# Patient Record
Sex: MALE | Employment: FULL TIME | URBAN - METROPOLITAN AREA
[De-identification: names, ages, dates, MRNs, and addresses within clinical notes are randomized per-mention and may not be internally consistent; named-entity substitution may affect disease eponyms.]

---

## 2017-02-08 ENCOUNTER — OFFICE VISIT (OUTPATIENT)
Dept: INTERNAL MEDICINE | Facility: CLINIC | Age: 47
End: 2017-02-08
Payer: COMMERCIAL

## 2017-02-08 ENCOUNTER — LAB VISIT (OUTPATIENT)
Dept: LAB | Facility: HOSPITAL | Age: 47
End: 2017-02-08
Attending: INTERNAL MEDICINE
Payer: COMMERCIAL

## 2017-02-08 VITALS
TEMPERATURE: 99 F | DIASTOLIC BLOOD PRESSURE: 82 MMHG | BODY MASS INDEX: 28.13 KG/M2 | SYSTOLIC BLOOD PRESSURE: 118 MMHG | HEIGHT: 68 IN | HEART RATE: 79 BPM | WEIGHT: 185.63 LBS

## 2017-02-08 DIAGNOSIS — R10.30 LOWER ABDOMINAL PAIN: ICD-10-CM

## 2017-02-08 DIAGNOSIS — N50.819 TESTICULAR PAIN: ICD-10-CM

## 2017-02-08 DIAGNOSIS — M54.50 ACUTE BILATERAL LOW BACK PAIN WITHOUT SCIATICA: ICD-10-CM

## 2017-02-08 DIAGNOSIS — R10.30 LOWER ABDOMINAL PAIN: Primary | ICD-10-CM

## 2017-02-08 LAB
ALBUMIN SERPL BCP-MCNC: 3.8 G/DL
ALP SERPL-CCNC: 81 U/L
ALT SERPL W/O P-5'-P-CCNC: 13 U/L
ANION GAP SERPL CALC-SCNC: 10 MMOL/L
AST SERPL-CCNC: 15 U/L
BASOPHILS # BLD AUTO: 0.02 K/UL
BASOPHILS NFR BLD: 0.2 %
BILIRUB SERPL-MCNC: 0.7 MG/DL
BUN SERPL-MCNC: 12 MG/DL
CALCIUM SERPL-MCNC: 9.6 MG/DL
CHLORIDE SERPL-SCNC: 105 MMOL/L
CO2 SERPL-SCNC: 24 MMOL/L
CREAT SERPL-MCNC: 1 MG/DL
DIFFERENTIAL METHOD: ABNORMAL
EOSINOPHIL # BLD AUTO: 0.2 K/UL
EOSINOPHIL NFR BLD: 1.8 %
ERYTHROCYTE [DISTWIDTH] IN BLOOD BY AUTOMATED COUNT: 12.8 %
EST. GFR  (AFRICAN AMERICAN): >60 ML/MIN/1.73 M^2
EST. GFR  (NON AFRICAN AMERICAN): >60 ML/MIN/1.73 M^2
GLUCOSE SERPL-MCNC: 91 MG/DL
HCT VFR BLD AUTO: 43 %
HGB BLD-MCNC: 15 G/DL
LYMPHOCYTES # BLD AUTO: 1.7 K/UL
LYMPHOCYTES NFR BLD: 17.9 %
MCH RBC QN AUTO: 31.6 PG
MCHC RBC AUTO-ENTMCNC: 34.9 %
MCV RBC AUTO: 91 FL
MONOCYTES # BLD AUTO: 0.6 K/UL
MONOCYTES NFR BLD: 6.1 %
NEUTROPHILS # BLD AUTO: 6.8 K/UL
NEUTROPHILS NFR BLD: 73.8 %
PLATELET # BLD AUTO: 200 K/UL
PMV BLD AUTO: 10.2 FL
POTASSIUM SERPL-SCNC: 4.4 MMOL/L
PROT SERPL-MCNC: 7.3 G/DL
RBC # BLD AUTO: 4.74 M/UL
SODIUM SERPL-SCNC: 139 MMOL/L
WBC # BLD AUTO: 9.21 K/UL

## 2017-02-08 PROCEDURE — 99999 PR PBB SHADOW E&M-EST. PATIENT-LVL III: CPT | Mod: PBBFAC,,, | Performed by: INTERNAL MEDICINE

## 2017-02-08 PROCEDURE — 82270 OCCULT BLOOD FECES: CPT | Mod: S$GLB,,, | Performed by: INTERNAL MEDICINE

## 2017-02-08 PROCEDURE — 99214 OFFICE O/P EST MOD 30 MIN: CPT | Mod: S$GLB,,, | Performed by: INTERNAL MEDICINE

## 2017-02-08 PROCEDURE — 36415 COLL VENOUS BLD VENIPUNCTURE: CPT

## 2017-02-08 PROCEDURE — 80053 COMPREHEN METABOLIC PANEL: CPT

## 2017-02-08 PROCEDURE — 85025 COMPLETE CBC W/AUTO DIFF WBC: CPT

## 2017-02-08 NOTE — PROGRESS NOTES
Subjective:       Patient ID: Medardo Vizcaino III is a 46 y.o. male.    Chief Complaint: sore stomach; Leg Pain; and Back Pain    HPI Comments: HPI: 46-year-old male comes in with about 6 days' complaints of lower abdominal discomfort, low back pain and groin pain.  He presents with the story that last Thursday he ate some spicy vegetarian salad which is a little unusual.  Otherwise every Thursday night he goes out with friends and has 2 or 3 mixed drinks.  He doesn't drink much other than that.  On the way there he thought it is felt a little unusual and blamed it on the spicy food.  He still enjoyed the time with his friends, had a few alcoholic but is when he went home and finished off the spicy food.  He said it had a lot of onions and spice to it.  The following day he said his stomach felt somewhat upset so he took Tums and stool softener.  He was having a good appetite and could eat.  He was passing his bowel movements normally but subsequently developed low back discomfort, and the feeling of particular  achiness. no burning with urination.  No blood in the urine or stool.  No nausea.  Only some lower abdominal discomfort.  At the end of each day aching becomes worse and he has mild tenderness.  He denies fevers or chills.  Today is a little better but he says he usually gets worse at night.  He is concerned that he has something significant going on in his abdomen.  He has absolutely no family history of cancer    Leg Pain      Back Pain   Associated symptoms include abdominal pain and leg pain. Pertinent negatives include no chest pain, fever or headaches.     Review of Systems   Constitutional: Negative for chills, fatigue, fever and unexpected weight change.   HENT: Negative for trouble swallowing.    Eyes: Negative for visual disturbance.   Respiratory: Negative for cough, shortness of breath and wheezing.    Cardiovascular: Negative for chest pain and palpitations.   Gastrointestinal: Positive for  abdominal pain. Negative for constipation, diarrhea, nausea and vomiting.   Genitourinary: Positive for testicular pain (mild, achy). Negative for difficulty urinating.   Musculoskeletal: Positive for back pain. Negative for neck pain.   Skin: Negative for rash.   Neurological: Negative for dizziness and headaches.       Objective:      Physical Exam   Constitutional: He is oriented to person, place, and time. He appears well-developed and well-nourished. No distress.   HENT:   Head: Normocephalic and atraumatic.   Right Ear: External ear normal.   Left Ear: External ear normal.   Mouth/Throat: Oropharynx is clear and moist. No oropharyngeal exudate.   TM's clear, pharynx clear   Eyes: Conjunctivae and EOM are normal. Pupils are equal, round, and reactive to light. No scleral icterus.   Neck: Normal range of motion. Neck supple. No thyromegaly present.   No supraclavicular nodes palpated   Cardiovascular: Normal rate, regular rhythm and normal heart sounds.    No murmur heard.  Pulmonary/Chest: Effort normal and breath sounds normal. He has no wheezes.   Abdominal: Soft. Bowel sounds are normal. He exhibits no distension and no mass. There is no tenderness. There is no rebound and no guarding. No hernia. Hernia confirmed negative in the right inguinal area and confirmed negative in the left inguinal area.   Genitourinary: Rectum normal, prostate normal and penis normal. Rectal exam shows guaiac negative stool. No penile tenderness.   Genitourinary Comments: Penile ring in place through the urethral meatus. Another ring at the base of the scrotum posteriorly. No redness or tenderness.   Hemoccult Card Test revealed + control.    Musculoskeletal: He exhibits no edema.   Lymphadenopathy:     He has no cervical adenopathy.   Neurological: He is alert and oriented to person, place, and time.   Skin: No pallor.   Psychiatric: He has a normal mood and affect.       Assessment:       1. Lower abdominal pain    2. Testicular  pain    3. Acute bilateral low back pain without sciatica        Plan:       Medardo was seen today for sore stomach, leg pain and back pain.    Diagnoses and all orders for this visit:    Lower abdominal pain  -     Urine culture; Future  -     Urinalysis; Future  -     CBC auto differential; Future  -     Comprehensive metabolic panel; Future    Testicular pain  -     Urine culture; Future  -     Urinalysis; Future  -     CBC auto differential; Future  -     Comprehensive metabolic panel; Future    Acute bilateral low back pain without sciatica  -     Urine culture; Future  -     Urinalysis; Future  -     CBC auto differential; Future  -     Comprehensive metabolic panel; Future        Long discussion with patient that I truly suspect this relates to food and/or gastrointestinal condition, virus etc.  I asked him to continue treatment, and acid, bland diet, ibuprofen when necessary.  We will check labs and urine.  If all unremarkable and symptoms may consider imaging of the abdomen.

## 2017-02-08 NOTE — MR AVS SNAPSHOT
"    [facility]  [address]  [facility] [address]  Phone: [phone]  Fax: [phone]                  Medardo Vizcaino III   2017 2:45 PM   Office Visit    Description:  Male : 1970   Provider:  Arron Chen MD   Department:  [facility]           Reason for Visit     sore stomach     Leg Pain     Back Pain           Diagnoses this Visit        Comments    Lower abdominal pain    -  Primary     Testicular pain         Acute bilateral low back pain without sciatica                To Do List           Goals (5 Years of Data)     None      [facility]     [facility] Nurse Care Line -  Assistance  Registered nurses in the [facility] provide clinical advisement, health education, appointment booking, and other advisory services.  Call for this free service at [phone].             Medications           Message regarding Medications     Verify the changes and/or additions to your medication regime listed below are the same as discussed with your clinician today.  If any of these changes or additions are incorrect, please notify your healthcare provider.             Verify that the below list of medications is an accurate representation of the medications you are currently taking.  If none reported, the list may be blank. If incorrect, please contact your healthcare provider. Carry this list with you in case of emergency.           Current Medications     levothyroxine (SYNTHROID) 75 MCG tablet Take 1 tablet (75 mcg total) by mouth once daily.           Clinical Reference Information           Your Vitals Were     BP Pulse Temp Height Weight BMI    118/82 (BP Location: Left arm, Patient Position: Sitting, BP Method: Manual) 79 98.7 °F (37.1 °C) (Oral) 5' 8" (1.727 m) 84.2 kg (185 lb 10 oz) 28.22 kg/m2      Blood Pressure          Most Recent Value    BP  118/82      Allergies as of 2017     Sulfa (Sulfonamide " Antibiotics)      Immunizations Administered on Date of Encounter - 2/8/2017     None      Orders Placed During Today's Visit     Future Labs/Procedures Expected by Expires    CBC auto differential  2/8/2017 2/8/2018    Comprehensive metabolic panel  2/8/2017 2/8/2018    Urinalysis  2/8/2017 (Approximate) 4/9/2017    Urine culture  2/8/2017 (Approximate) 4/9/2017      Language Assistance Services     ATTENTION: Language assistance services are available, free of charge. Please call [phone].      ATENCIÓN: Si habla español, tiene a noyola disposición servicios gratuitos de asistencia lingüística. Llame al [phone].     CHÚ Ý: N?u b?n nói Ti?ng Vi?t, có các d?ch v? h? tr? ngôn ng? mi?n phí dành cho b?n. G?i s? [phone].         [facility] complies with applicable Federal civil rights laws and does not discriminate on the basis of race, color, national origin, age, disability, or sex.

## 2017-02-09 ENCOUNTER — PATIENT MESSAGE (OUTPATIENT)
Dept: INTERNAL MEDICINE | Facility: CLINIC | Age: 47
End: 2017-02-09

## 2017-02-10 ENCOUNTER — PATIENT MESSAGE (OUTPATIENT)
Dept: INTERNAL MEDICINE | Facility: CLINIC | Age: 47
End: 2017-02-10

## 2017-06-26 ENCOUNTER — TELEPHONE (OUTPATIENT)
Dept: SPORTS MEDICINE | Facility: CLINIC | Age: 47
End: 2017-06-26

## 2017-06-26 NOTE — TELEPHONE ENCOUNTER
LVM to offer pt earlier appt with Dr. Argueta on 6/28 at 9:45am. Advised to call office back if he would like this appt.

## 2017-06-28 ENCOUNTER — OFFICE VISIT (OUTPATIENT)
Dept: SPORTS MEDICINE | Facility: CLINIC | Age: 47
End: 2017-06-28
Payer: COMMERCIAL

## 2017-06-28 ENCOUNTER — HOSPITAL ENCOUNTER (OUTPATIENT)
Dept: RADIOLOGY | Facility: HOSPITAL | Age: 47
Discharge: HOME | End: 2017-06-28
Attending: ORTHOPAEDIC SURGERY
Payer: COMMERCIAL

## 2017-06-28 VITALS
WEIGHT: 185 LBS | DIASTOLIC BLOOD PRESSURE: 82 MMHG | BODY MASS INDEX: 28.04 KG/M2 | HEART RATE: 75 BPM | SYSTOLIC BLOOD PRESSURE: 134 MMHG | HEIGHT: 68 IN

## 2017-06-28 DIAGNOSIS — M25.521 RIGHT ELBOW PAIN: ICD-10-CM

## 2017-06-28 DIAGNOSIS — M77.11 LATERAL EPICONDYLITIS OF RIGHT ELBOW: Primary | ICD-10-CM

## 2017-06-28 PROCEDURE — 99214 OFFICE O/P EST MOD 30 MIN: CPT | Mod: 25,S$GLB,, | Performed by: ORTHOPAEDIC SURGERY

## 2017-06-28 PROCEDURE — 73080 X-RAY EXAM OF ELBOW: CPT | Mod: 26,RT,, | Performed by: RADIOLOGY

## 2017-06-28 PROCEDURE — 99999 PR PBB SHADOW E&M-EST. PATIENT-LVL V: CPT | Mod: PBBFAC,,, | Performed by: ORTHOPAEDIC SURGERY

## 2017-06-28 PROCEDURE — 73080 X-RAY EXAM OF ELBOW: CPT | Mod: TC,PO,RT

## 2017-06-28 PROCEDURE — 20551 NJX 1 TENDON ORIGIN/INSJ: CPT | Mod: RT,S$GLB,, | Performed by: ORTHOPAEDIC SURGERY

## 2017-06-28 RX ORDER — MELOXICAM 15 MG/1
15 TABLET ORAL DAILY
Qty: 30 TABLET | Refills: 2 | Status: SHIPPED | OUTPATIENT
Start: 2017-06-28 | End: 2017-06-28 | Stop reason: SDUPTHER

## 2017-06-28 RX ORDER — MELOXICAM 15 MG/1
15 TABLET ORAL DAILY
Qty: 30 TABLET | Refills: 2 | Status: SHIPPED | OUTPATIENT
Start: 2017-06-28 | End: 2017-07-28

## 2017-06-28 RX ORDER — BETAMETHASONE SODIUM PHOSPHATE AND BETAMETHASONE ACETATE 3; 3 MG/ML; MG/ML
6 INJECTION, SUSPENSION INTRA-ARTICULAR; INTRALESIONAL; INTRAMUSCULAR; SOFT TISSUE
Status: DISCONTINUED | OUTPATIENT
Start: 2017-06-28 | End: 2017-06-28

## 2017-06-28 RX ORDER — BETAMETHASONE SODIUM PHOSPHATE AND BETAMETHASONE ACETATE 3; 3 MG/ML; MG/ML
12 INJECTION, SUSPENSION INTRA-ARTICULAR; INTRALESIONAL; INTRAMUSCULAR; SOFT TISSUE
Status: COMPLETED | OUTPATIENT
Start: 2017-06-28 | End: 2017-06-28

## 2017-06-28 RX ADMIN — BETAMETHASONE SODIUM PHOSPHATE AND BETAMETHASONE ACETATE 12 MG: 3; 3 INJECTION, SUSPENSION INTRA-ARTICULAR; INTRALESIONAL; INTRAMUSCULAR; SOFT TISSUE at 12:06

## 2017-06-28 NOTE — PATIENT INSTRUCTIONS
Understanding Lateral Epicondylitis    Tendons are strong bands of tissue that connect muscles to bones. Lateral epicondylitis affects the tendons that connect muscles in the forearm to the lateral epicondyle. This is the bony knob on the outer side of the elbow. The condition occurs if the extensor tendons of the wrist become red and swollen (irritated). This can cause pain in the elbow, forearm, and wrist. Because the condition is sometimes caused by playing tennis, it is also known as tennis elbow.  How to say it  LA-tuhr-angi [id number]   What causes lateral epicondylitis?  The condition most often occurs because of overuse. This can be from any activity that repeatedly puts stress on the forearm extensor muscles or tendons and wrist. For instance, playing tennis, lifting weights, cutting meat, painting, and typing can all cause the condition. Wear and tear of the tendons from aging or an injury to the tendons can also cause the condition.  Symptoms of lateral epicondylitis  The most common symptom is pain. You may feel it on the outer side of the elbow and down the back of the forearm. It may be worse when moving or using the elbow, forearm, or wrist. You may also feel pain when gripping or lifting things.  Treatment for lateral epicondylitis  Treatments may include:  · Resting the elbow, forearm, and wrist. Youll need to avoid movements that can make your symptoms worse. You also may need to avoid certain sports and types of work for a time. This helps relieve symptoms and prevent further damage to the tendons.  · Changing the action that caused the problem. For instance, if the tendons were damaged from playing tennis, it may help to change your playing technique or use different equipment. This helps prevent further damage to the tendons.  · Using cold packs. Putting an ice pack on the injured area can help reduce pain and swelling.  · Taking pain medicines. Taking prescription or  over-the-counter pain medicines may help reduce pain and swelling.    · Wearing a brace. This helps reduce strain on the muscles and tendons in the forearm, which may relieve symptoms. It is very important to wear the brace properly.  · Doing exercises and physical therapy. These help improve strength and range of motion in the elbow, forearm, and wrist.  · Getting shots of medicine into the injured area. These may help relieve symptoms for a time.  · Having surgery. This may be an option if other treatments fail to relieve symptoms. In many cases, the surgeon removes the damaged tissue.  Possible complications of lateral epicondylitis  If the tendons involved dont heal properly, symptoms may return or get worse. To help prevent this, follow your treatment plan as directed.  When to call your healthcare provider  Call your healthcare provider right away if you have any of these:  · Fever of 100.4°F (38°C) or higher, or as directed  · Redness, swelling, or warmth in the elbow or forearm that gets worse  · Symptoms that dont get better with treatment, or get worse  · New symptoms   Date Last Reviewed: 3/10/2016  © 2665-1138 [employer]. [address] [place] [id number]. All rights reserved. This information is not intended as a substitute for professional medical care. Always follow your healthcare professional's instructions.

## 2017-06-28 NOTE — PROGRESS NOTES
Subjective:          Chief Complaint: Medardo Vizcaino III is a 46 y.o. male who had concerns including Follow-up of the Right Elbow.    45 yo male was moving and extension ladder and felt some pain consistent with tennis elbow; patient caught something 6 days later and had elbow pain            ROS    Pain Related Questions  Over the past 3 days, what was your average pain during activity? (I.e. running, jogging, walking, climbing stairs, getting dressed, ect.): 6  Over the past 3 days, what was your highest pain level?: 6  Over the past 3 days, what was your lowest pain level? : 5    Other  How many nights a week are you awakened by your affected body part?: 2  Was the patient's HEIGHT measured or patient reported?: Patient Reported  Was the patient's WEIGHT measured or patient reported?: Measured      Objective:        General: Medardo is well-developed, well-nourished, appears stated age, in no acute distress, alert and oriented to time, place and person.     General    Vitals reviewed.  Constitutional: He is oriented to person, place, and time. He appears well-developed and well-nourished. No distress.   HENT:   Right Ear: External ear normal.   Left Ear: External ear normal.   Nose: Nose normal.   Eyes: Pupils are equal, round, and reactive to light. Right eye exhibits no discharge. Left eye exhibits no discharge.   Neck: Normal range of motion.   Pulmonary/Chest: Effort normal and breath sounds normal. No respiratory distress.   Abdominal: Soft.   Neurological: He is alert and oriented to person, place, and time. He has normal reflexes. No cranial nerve deficit. He exhibits normal muscle tone. Coordination normal.   Psychiatric: He has a normal mood and affect. His behavior is normal. Judgment and thought content normal.             Right Hand/Wrist Exam     Inspection   Scars: Wrist - absent   Effusion: Wrist - absent   Bruising: Wrist - absent   Deformity: Wrist - deformity     Range of Motion     Wrist    Extension:  50 normal   Flexion:  70 normal   Abduction: 20 normal  Adduction: 35 normal    Tests   Phalens Sign: negative  Tinels Sign (Medial Nerve): negative  Finkelstein: negative  Carpal Tunnel Compression Test: negative  Cubital Tunnel Compression Test: negative  LT Stability: negative  Dean's Test: negative      Other     Neuorologic Exam    Median Distribution: normal  Ulnar Distribution: normal  Radial Distribution: normal      Left Hand/Wrist Exam     Inspection   Scars: Wrist - absent   Effusion: Wrist - absent   Bruising: Wrist - absent   Deformity: Wrist - absent     Range of Motion     Wrist   Extension:  50 normal   Flexion:  70 normal   Abduction: 20 normal  Adduction: 35 normal    Tests   Phalens Sign: negative  Tinels Sign (Medial Nerve): negative  Finkelstein: negative  Carpal Tunnel Compression Test: negative  Cubital Tunnel Compression Test: negative  LT Stability: negative  Dean's Test: negative      Other     Sensory Exam  Median Distribution: normal  Ulnar Distribution: normal  Radial Distribution: normal      Right Elbow Exam     Inspection   Scars: absent  Effusion: absent  Bruising: absent  Deformity: absent  Atrophy: absent    Pain   The patient exhibits pain of the lateral epicondyle    Range of Motion   Extension:  0 normal   Flexion:  130 normal   Pronation: normal Right elbow pronation: 50.   Supination: normal Right elbow supination: 50.     Tests Varus: negative  Valgus: negative  Tinel's Sign (cubital tunnel): negative  Tennis Elbow: moderate  Golfer's Elbow: negative  Radial Capitellar Grind: negative    Other   Sensation: normal    Comments:  TTP over lateral epicondyle and muscle mass      Left Elbow Exam     Inspection   Scars: absent  Effusion: absent  Bruising: absent  Deformity: absent  Atrophy: absent    Range of Motion   Extension:  0 normal   Flexion:  130 normal   Pronation: normal Left elbow pronation: 50.   Supination: normal Left elbow supination: 50.      Tests Varus: negative  Tinel's Sign (cubital tunnel): negative  Tennis Elbow: negative  Golfer's Elbow: negative  Radial Capitellar Grind: negative    Other   Sensation: normal        Muscle Strength   Right Upper Extremity   Wrist Extension: 5/5/5   Wrist Flexion: 5/5/5   : 5/5/5   Pinch Mechanism: 5/5  Elbow Pronation:  5/5   Elbow Supination:  5/5   Elbow Extension: 5/5  Elbow Flexion: 5/5  Intrinsics: 5/5  EPL (Extensor Pollicis Longus): 5/5  Left Upper Extremity  Wrist Extension: 5/5/5   Wrist Flexion: 5/5/5   :  5/5/5   Pinch Mechanism: 5/5  Elbow Pronation:  5/5   Elbow Supination:  5/5   Elbow Extension: 5/5  Elbow Flexion: 5/5  Intrinsics: 5/5  EPL (Extensor Pollicis Longus): 5/5    Vascular Exam     Right Pulses      Radial:                    2+      Left Pulses      Radial:                    2+      Capillary Refill  Right Hand: normal capillary refill  Left Hand: normal capillary refill  Right Matt's Test: no rapid refill no slow refill  Left Matt's Test: no rapid refill no slow refill    Edema  Right Forearm: absent  Left Forearm: absent        Xray:  3 views: No fracture dislocation bone destruction seen.  There is DJD and there is a triceps spur.  This triceps spur may have recently fractured.            Assessment:       Encounter Diagnoses   Name Primary?    Right elbow pain     Lateral epicondylitis of right elbow Yes          Plan:       1. Elbow Function Score, SF-12 was filled out today in clinic.     RTC in 6 weeks with Dr. Bautista Argueta. Patient will fill out Elbow Function Score, and SF-12 on return.    2. Pain control ( Meloxicam)    3. Injection Procedure right elbow    After time out was performed, including verification of patient ID, procedure, site and side, availability of information and equipment, review of safety issues, and agreement with consent, the procedure site was marked and the patient was prepped aseptically. A diagnostic and therapeutic injection of 3cc  Betamethasone/Marcaine and ethyl chloride spray was given under sterile technique using a 21.5g x 1.5 needle into the right Lateral Epicondyle/Extension Tendon in seated position.     The patient had no adverse reactions to the medication. Pain decreased. The patient was instructed to apply ice to the joint for 20 minutes and avoid strenuous activities for 24-36 hours following the injection. Patient was warned of possible blood sugar and/or blood pressure changes during that time. Following that time, patient can resume regular activities.    Patient was reminded to call the clinic immediately for any adverse side effects as explained in clinic today.    [id number] - tomeka carlin, performed a custom orthotic / brace adjustment, fitting and training with the patient. The patient demonstrated understanding and proper care. This was performed for 15 minutes.                [facility] patient questionnaires have been collected today.

## 2017-08-10 ENCOUNTER — OFFICE VISIT (OUTPATIENT)
Dept: PSYCHIATRY | Facility: CLINIC | Age: 47
End: 2017-08-10
Payer: COMMERCIAL

## 2017-08-10 DIAGNOSIS — Z03.89 NO DIAGNOSIS ON AXIS I: Primary | ICD-10-CM

## 2017-08-10 PROCEDURE — 90791 PSYCH DIAGNOSTIC EVALUATION: CPT | Mod: S$GLB,,, | Performed by: SOCIAL WORKER

## 2017-09-07 ENCOUNTER — OFFICE VISIT (OUTPATIENT)
Dept: PSYCHIATRY | Facility: CLINIC | Age: 47
End: 2017-09-07
Payer: COMMERCIAL

## 2017-09-07 DIAGNOSIS — F43.23 ADJUSTMENT DISORDER WITH MIXED ANXIETY AND DEPRESSED MOOD: Primary | ICD-10-CM

## 2017-09-07 PROCEDURE — 99999 PR PBB SHADOW E&M-EST. PATIENT-LVL I: CPT | Mod: PBBFAC,,, | Performed by: SOCIAL WORKER

## 2017-09-07 PROCEDURE — 90847 FAMILY PSYTX W/PT 50 MIN: CPT | Mod: S$GLB,,, | Performed by: SOCIAL WORKER

## 2017-09-12 ENCOUNTER — LAB VISIT (OUTPATIENT)
Dept: LAB | Facility: HOSPITAL | Age: 47
End: 2017-09-12
Attending: INTERNAL MEDICINE
Payer: COMMERCIAL

## 2017-09-12 ENCOUNTER — OFFICE VISIT (OUTPATIENT)
Dept: INTERNAL MEDICINE | Facility: CLINIC | Age: 47
End: 2017-09-12
Payer: COMMERCIAL

## 2017-09-12 VITALS
BODY MASS INDEX: 27.19 KG/M2 | SYSTOLIC BLOOD PRESSURE: 110 MMHG | HEART RATE: 63 BPM | HEIGHT: 68 IN | DIASTOLIC BLOOD PRESSURE: 72 MMHG | WEIGHT: 179.44 LBS

## 2017-09-12 DIAGNOSIS — E03.4 HYPOTHYROIDISM DUE TO ACQUIRED ATROPHY OF THYROID: ICD-10-CM

## 2017-09-12 DIAGNOSIS — M77.11 LATERAL EPICONDYLITIS OF RIGHT ELBOW: ICD-10-CM

## 2017-09-12 DIAGNOSIS — Z00.00 ROUTINE MEDICAL EXAM: Primary | ICD-10-CM

## 2017-09-12 DIAGNOSIS — E78.5 HYPERLIPIDEMIA, UNSPECIFIED HYPERLIPIDEMIA TYPE: ICD-10-CM

## 2017-09-12 DIAGNOSIS — Z00.00 ROUTINE MEDICAL EXAM: ICD-10-CM

## 2017-09-12 LAB
ANION GAP SERPL CALC-SCNC: 10 MMOL/L
BUN SERPL-MCNC: 12 MG/DL
CALCIUM SERPL-MCNC: 9.7 MG/DL
CHLORIDE SERPL-SCNC: 107 MMOL/L
CHOLEST SERPL-MCNC: 228 MG/DL
CHOLEST/HDLC SERPL: 4.1 {RATIO}
CO2 SERPL-SCNC: 23 MMOL/L
CREAT SERPL-MCNC: 1 MG/DL
EST. GFR  (AFRICAN AMERICAN): >60 ML/MIN/1.73 M^2
EST. GFR  (NON AFRICAN AMERICAN): >60 ML/MIN/1.73 M^2
GLUCOSE SERPL-MCNC: 97 MG/DL
HDLC SERPL-MCNC: 56 MG/DL
HDLC SERPL: 24.6 %
LDLC SERPL CALC-MCNC: 156.2 MG/DL
NONHDLC SERPL-MCNC: 172 MG/DL
POTASSIUM SERPL-SCNC: 4.4 MMOL/L
SODIUM SERPL-SCNC: 140 MMOL/L
TRIGL SERPL-MCNC: 79 MG/DL
TSH SERPL DL<=0.005 MIU/L-ACNC: 2.24 UIU/ML

## 2017-09-12 PROCEDURE — 84443 ASSAY THYROID STIM HORMONE: CPT

## 2017-09-12 PROCEDURE — 99999 PR PBB SHADOW E&M-EST. PATIENT-LVL III: CPT | Mod: PBBFAC,,, | Performed by: INTERNAL MEDICINE

## 2017-09-12 PROCEDURE — 36415 COLL VENOUS BLD VENIPUNCTURE: CPT

## 2017-09-12 PROCEDURE — 80061 LIPID PANEL: CPT

## 2017-09-12 PROCEDURE — 80048 BASIC METABOLIC PNL TOTAL CA: CPT

## 2017-09-12 PROCEDURE — 99396 PREV VISIT EST AGE 40-64: CPT | Mod: S$GLB,,, | Performed by: INTERNAL MEDICINE

## 2017-09-12 RX ORDER — LEVOTHYROXINE SODIUM 75 UG/1
75 TABLET ORAL DAILY
Qty: 90 TABLET | Refills: 3 | Status: SHIPPED | OUTPATIENT
Start: 2017-09-12 | End: 2018-10-16 | Stop reason: SDUPTHER

## 2017-09-12 NOTE — PROGRESS NOTES
Subjective:       Patient ID: Medardo Vizcaino III is a 46 y.o. male.    Chief Complaint: Annual Exam    History of present illness: Patient comes in for annual exam.  He tells me he unfortunately is going through a divorce but does not feel like things are too stressed at this time.  He needs some labs for work and I will fill out his biometric screen.  His only other issue is he is recovering from right tennis elbow after an injury.  That seems to be getting better.  He did see orthopedics.      Review of Systems   Constitutional: Negative for activity change, chills, fatigue, fever and unexpected weight change.   HENT: Negative for ear pain, hearing loss, rhinorrhea, sore throat and trouble swallowing.    Eyes: Negative for pain, discharge and visual disturbance.   Respiratory: Negative for cough, chest tightness, shortness of breath and wheezing.    Cardiovascular: Negative for chest pain, palpitations and leg swelling.   Gastrointestinal: Negative for abdominal pain, blood in stool, constipation, diarrhea, nausea and vomiting.   Endocrine: Negative for polydipsia and polyuria.   Genitourinary: Negative for difficulty urinating, frequency, hematuria and urgency.   Musculoskeletal: Positive for arthralgias (right elbow pain laterally). Negative for back pain, joint swelling, myalgias, neck pain and neck stiffness.   Skin: Negative for rash and wound.   Neurological: Negative for dizziness, weakness, numbness and headaches.   Hematological: Negative for adenopathy.   Psychiatric/Behavioral: Negative for confusion and dysphoric mood. The patient is not nervous/anxious.        Objective:      Physical Exam   Constitutional: He is oriented to person, place, and time. He appears well-developed and well-nourished. No distress.   HENT:   Head: Normocephalic and atraumatic.   Right Ear: External ear normal.   Left Ear: External ear normal.   Mouth/Throat: Oropharynx is clear and moist. No oropharyngeal exudate.   TM's  clear, pharynx clear   Eyes: Conjunctivae and EOM are normal. Pupils are equal, round, and reactive to light. No scleral icterus.   Neck: Normal range of motion. Neck supple. No thyromegaly present.   No supraclavicular nodes palpated   Cardiovascular: Normal rate, regular rhythm and normal heart sounds.    No murmur heard.  Pulmonary/Chest: Effort normal and breath sounds normal. He has no wheezes.   Abdominal: Soft. Bowel sounds are normal. He exhibits no mass. There is no tenderness.   Musculoskeletal: He exhibits tenderness (mild lateral right elbow especially with pronation). He exhibits no edema.   Lymphadenopathy:     He has no cervical adenopathy.   Neurological: He is alert and oriented to person, place, and time.   Skin: No rash noted. No pallor.   Psychiatric: He has a normal mood and affect.       Assessment:       1. Routine medical exam    2. Hypothyroidism due to acquired atrophy of thyroid    3. Lateral epicondylitis of right elbow    4. Hyperlipidemia, unspecified hyperlipidemia type        Plan:       Medardo was seen today for annual exam.    Diagnoses and all orders for this visit:    Routine medical exam  -     Lipid panel; Future  -     Basic metabolic panel; Future  -     TSH; Future    Hypothyroidism due to acquired atrophy of thyroid  -     Lipid panel; Future  -     TSH; Future    Lateral epicondylitis of right elbow    Hyperlipidemia, unspecified hyperlipidemia type    Other orders  -     levothyroxine (SYNTHROID) 75 MCG tablet; Take 1 tablet (75 mcg total) by mouth once daily.        Review studies and follow-up annually

## 2017-09-18 ENCOUNTER — OFFICE VISIT (OUTPATIENT)
Dept: PSYCHIATRY | Facility: CLINIC | Age: 47
End: 2017-09-18
Payer: COMMERCIAL

## 2017-09-18 DIAGNOSIS — Z03.89 NO DIAGNOSIS ON AXIS I: Primary | ICD-10-CM

## 2017-09-18 PROCEDURE — 90834 PSYTX W PT 45 MINUTES: CPT | Mod: S$GLB,,, | Performed by: SOCIAL WORKER

## 2017-09-18 PROCEDURE — 99999 PR PBB SHADOW E&M-EST. PATIENT-LVL I: CPT | Mod: PBBFAC,,, | Performed by: SOCIAL WORKER

## 2018-09-11 ENCOUNTER — OFFICE VISIT (OUTPATIENT)
Dept: INTERNAL MEDICINE | Facility: CLINIC | Age: 48
End: 2018-09-11
Payer: COMMERCIAL

## 2018-09-11 ENCOUNTER — LAB VISIT (OUTPATIENT)
Dept: LAB | Facility: HOSPITAL | Age: 48
End: 2018-09-11
Attending: INTERNAL MEDICINE
Payer: COMMERCIAL

## 2018-09-11 ENCOUNTER — PATIENT MESSAGE (OUTPATIENT)
Dept: INTERNAL MEDICINE | Facility: CLINIC | Age: 48
End: 2018-09-11

## 2018-09-11 VITALS
HEART RATE: 66 BPM | BODY MASS INDEX: 27.96 KG/M2 | SYSTOLIC BLOOD PRESSURE: 138 MMHG | DIASTOLIC BLOOD PRESSURE: 88 MMHG | OXYGEN SATURATION: 98 % | HEIGHT: 68 IN | WEIGHT: 184.5 LBS

## 2018-09-11 DIAGNOSIS — E03.4 HYPOTHYROIDISM DUE TO ACQUIRED ATROPHY OF THYROID: ICD-10-CM

## 2018-09-11 DIAGNOSIS — Z00.00 ROUTINE PHYSICAL EXAMINATION: Primary | ICD-10-CM

## 2018-09-11 DIAGNOSIS — Z00.00 ROUTINE PHYSICAL EXAMINATION: ICD-10-CM

## 2018-09-11 DIAGNOSIS — M79.89 SOFT TISSUE MASS: ICD-10-CM

## 2018-09-11 LAB
ALBUMIN SERPL BCP-MCNC: 4.2 G/DL
ALP SERPL-CCNC: 74 U/L
ALT SERPL W/O P-5'-P-CCNC: 38 U/L
ANION GAP SERPL CALC-SCNC: 11 MMOL/L
AST SERPL-CCNC: 29 U/L
BASOPHILS # BLD AUTO: 0.03 K/UL
BASOPHILS NFR BLD: 0.5 %
BILIRUB SERPL-MCNC: 1 MG/DL
BUN SERPL-MCNC: 9 MG/DL
CALCIUM SERPL-MCNC: 9.7 MG/DL
CHLORIDE SERPL-SCNC: 105 MMOL/L
CHOLEST SERPL-MCNC: 243 MG/DL
CHOLEST/HDLC SERPL: 4 {RATIO}
CO2 SERPL-SCNC: 23 MMOL/L
CREAT SERPL-MCNC: 1 MG/DL
DIFFERENTIAL METHOD: NORMAL
EOSINOPHIL # BLD AUTO: 0.1 K/UL
EOSINOPHIL NFR BLD: 1.1 %
ERYTHROCYTE [DISTWIDTH] IN BLOOD BY AUTOMATED COUNT: 13.2 %
EST. GFR  (AFRICAN AMERICAN): >60 ML/MIN/1.73 M^2
EST. GFR  (NON AFRICAN AMERICAN): >60 ML/MIN/1.73 M^2
GLUCOSE SERPL-MCNC: 94 MG/DL
HCT VFR BLD AUTO: 45.6 %
HDLC SERPL-MCNC: 61 MG/DL
HDLC SERPL: 25.1 %
HGB BLD-MCNC: 15.2 G/DL
LDLC SERPL CALC-MCNC: 167.8 MG/DL
LYMPHOCYTES # BLD AUTO: 1.6 K/UL
LYMPHOCYTES NFR BLD: 26.3 %
MCH RBC QN AUTO: 30.9 PG
MCHC RBC AUTO-ENTMCNC: 33.3 G/DL
MCV RBC AUTO: 93 FL
MONOCYTES # BLD AUTO: 0.4 K/UL
MONOCYTES NFR BLD: 6.7 %
NEUTROPHILS # BLD AUTO: 4.1 K/UL
NEUTROPHILS NFR BLD: 65.2 %
NONHDLC SERPL-MCNC: 182 MG/DL
NRBC BLD-RTO: 0 /100 WBC
PLATELET # BLD AUTO: 231 K/UL
PMV BLD AUTO: 10.3 FL
POTASSIUM SERPL-SCNC: 3.9 MMOL/L
PROT SERPL-MCNC: 7.1 G/DL
RBC # BLD AUTO: 4.92 M/UL
SODIUM SERPL-SCNC: 139 MMOL/L
TRIGL SERPL-MCNC: 71 MG/DL
TSH SERPL DL<=0.005 MIU/L-ACNC: 1.91 UIU/ML
WBC # BLD AUTO: 6.24 K/UL

## 2018-09-11 PROCEDURE — 36415 COLL VENOUS BLD VENIPUNCTURE: CPT

## 2018-09-11 PROCEDURE — 80053 COMPREHEN METABOLIC PANEL: CPT

## 2018-09-11 PROCEDURE — 80061 LIPID PANEL: CPT

## 2018-09-11 PROCEDURE — 99396 PREV VISIT EST AGE 40-64: CPT | Mod: S$GLB,,, | Performed by: INTERNAL MEDICINE

## 2018-09-11 PROCEDURE — 85025 COMPLETE CBC W/AUTO DIFF WBC: CPT

## 2018-09-11 PROCEDURE — 99999 PR PBB SHADOW E&M-EST. PATIENT-LVL III: CPT | Mod: PBBFAC,,, | Performed by: INTERNAL MEDICINE

## 2018-09-11 PROCEDURE — 84443 ASSAY THYROID STIM HORMONE: CPT

## 2018-09-11 NOTE — PROGRESS NOTES
Subjective:       Patient ID: Medardo Vizcaino III is a 47 y.o. male.    Chief Complaint: Annual Exam    HPI:  Patient comes in for annual exam.  Is history of hypothyroidism and needs updated labs.  He has a few lumps on the right arm including forearm that had been present for a while but a new lesion on the upper arm he thinks is new.  Over the years he had 2 cyst or lipoma was removed from that warm.  He does not have any anywhere else wanted to know if it was unusual to have 5 on the same warm.  None seem to be growing and he would rather wait a little while before seeing a surgeon to get this checked or removed.  I certainly think that is reasonable.  Unless any of the started growing rapidly or causing pain.  We updated his history such that his a angioplasty and a pacemaker placed.  Patient is starting to have some arthritis symptoms and he is concerned a bit because both of his parents had joint surgeries in the past.  Stressed good diet exercise and regular attention to weight      Review of Systems   Constitutional: Negative for chills, fatigue, fever and unexpected weight change.   HENT: Negative for nosebleeds and trouble swallowing.    Eyes: Negative for pain and visual disturbance.   Respiratory: Negative for cough, shortness of breath and wheezing.    Cardiovascular: Negative for chest pain and palpitations.   Gastrointestinal: Negative for abdominal pain, constipation, diarrhea, nausea and vomiting.   Genitourinary: Negative for difficulty urinating and hematuria.   Musculoskeletal: Positive for arthralgias. Negative for neck pain.   Skin: Negative for rash.        Three soft tissue mobile nontender lumps on the right arm   Neurological: Negative for dizziness and headaches.   Hematological: Does not bruise/bleed easily.   Psychiatric/Behavioral: Negative for dysphoric mood, sleep disturbance and suicidal ideas.       Objective:      Physical Exam   Constitutional: He is oriented to person, place,  and time. He appears well-developed and well-nourished. No distress.   HENT:   Head: Normocephalic and atraumatic.   Right Ear: External ear normal.   Left Ear: External ear normal.   Mouth/Throat: Oropharynx is clear and moist. No oropharyngeal exudate.   TM's clear, pharynx clear   Eyes: Conjunctivae and EOM are normal. Pupils are equal, round, and reactive to light. No scleral icterus.   Neck: Normal range of motion. Neck supple. No thyromegaly present.   No supraclavicular nodes palpated   Cardiovascular: Normal rate, regular rhythm and normal heart sounds.   No murmur heard.  Pulmonary/Chest: Effort normal and breath sounds normal. He has no wheezes.   Abdominal: Soft. Bowel sounds are normal. He exhibits no mass. There is no tenderness.   Musculoskeletal: He exhibits no edema.   Lymphadenopathy:     He has no cervical adenopathy.   No cervical, supraclavicular, axillary, epitrochlear or pectoral lymphadenopathy   Neurological: He is alert and oriented to person, place, and time.   Skin: No rash noted. No erythema. No pallor.   Three soft tissue superficial lumps or masses on the right arm.  Two below the elbow which or nontender have been present for a while.  Upper arm the triceps is also nontender I suspect lipoma or cyst.   Psychiatric: He has a normal mood and affect. His behavior is normal.       Assessment:       1. Routine physical examination    2. Hypothyroidism due to acquired atrophy of thyroid    3. Soft tissue mass        Plan:       Medardo was seen today for annual exam.    Diagnoses and all orders for this visit:    Routine physical examination  -     TSH; Future  -     Lipid panel; Future  -     CBC auto differential; Future  -     Comprehensive metabolic panel; Future    Hypothyroidism due to acquired atrophy of thyroid  -     TSH; Future    Soft tissue mass  Comments:  Right forearm

## 2018-09-13 ENCOUNTER — PATIENT MESSAGE (OUTPATIENT)
Dept: INTERNAL MEDICINE | Facility: CLINIC | Age: 48
End: 2018-09-13

## 2018-09-13 DIAGNOSIS — M54.50 BACK PAIN OF THORACOLUMBAR REGION: ICD-10-CM

## 2018-09-13 DIAGNOSIS — M54.6 BACK PAIN OF THORACOLUMBAR REGION: ICD-10-CM

## 2018-09-13 DIAGNOSIS — M54.2 NECK PAIN: Primary | ICD-10-CM

## 2018-10-16 RX ORDER — LEVOTHYROXINE SODIUM 75 UG/1
TABLET ORAL
Qty: 90 TABLET | Refills: 3 | Status: SHIPPED | OUTPATIENT
Start: 2018-10-16 | End: 2019-11-13 | Stop reason: SDUPTHER

## 2018-12-05 ENCOUNTER — PATIENT MESSAGE (OUTPATIENT)
Dept: INTERNAL MEDICINE | Facility: CLINIC | Age: 48
End: 2018-12-05

## 2018-12-05 DIAGNOSIS — M79.641 PAIN IN BOTH HANDS: ICD-10-CM

## 2018-12-05 DIAGNOSIS — M79.642 PAIN IN BOTH HANDS: ICD-10-CM

## 2018-12-05 DIAGNOSIS — M19.90 ARTHRITIS: Primary | ICD-10-CM

## 2018-12-05 DIAGNOSIS — D17.9 LIPOMA, UNSPECIFIED SITE: ICD-10-CM

## 2018-12-05 DIAGNOSIS — M54.2 NECK PAIN: ICD-10-CM

## 2018-12-05 DIAGNOSIS — M54.50 BACK PAIN OF THORACOLUMBAR REGION: ICD-10-CM

## 2018-12-05 DIAGNOSIS — M54.6 BACK PAIN OF THORACOLUMBAR REGION: ICD-10-CM

## 2018-12-07 ENCOUNTER — PATIENT MESSAGE (OUTPATIENT)
Dept: INTERNAL MEDICINE | Facility: CLINIC | Age: 48
End: 2018-12-07

## 2018-12-07 NOTE — TELEPHONE ENCOUNTER
Help pt with General Surgery and Rheumatology. He has the chiropractor appt but may need the referral sent to him or the doctor.

## 2018-12-26 ENCOUNTER — PATIENT MESSAGE (OUTPATIENT)
Dept: INTERNAL MEDICINE | Facility: CLINIC | Age: 48
End: 2018-12-26

## 2018-12-26 DIAGNOSIS — Z12.83 SKIN CANCER SCREENING: Primary | ICD-10-CM

## 2019-01-07 ENCOUNTER — OFFICE VISIT (OUTPATIENT)
Dept: SURGERY | Facility: CLINIC | Age: 49
End: 2019-01-07
Payer: COMMERCIAL

## 2019-01-07 VITALS
DIASTOLIC BLOOD PRESSURE: 85 MMHG | WEIGHT: 171 LBS | TEMPERATURE: 98 F | SYSTOLIC BLOOD PRESSURE: 127 MMHG | HEART RATE: 62 BPM | HEIGHT: 68 IN | BODY MASS INDEX: 25.91 KG/M2

## 2019-01-07 DIAGNOSIS — D17.21 LIPOMA OF RIGHT UPPER EXTREMITY: Primary | ICD-10-CM

## 2019-01-07 PROCEDURE — 99999 PR PBB SHADOW E&M-EST. PATIENT-LVL III: ICD-10-PCS | Mod: PBBFAC,,, | Performed by: SURGERY

## 2019-01-07 PROCEDURE — 3008F BODY MASS INDEX DOCD: CPT | Mod: CPTII,S$GLB,, | Performed by: SURGERY

## 2019-01-07 PROCEDURE — 99203 OFFICE O/P NEW LOW 30 MIN: CPT | Mod: S$GLB,,, | Performed by: SURGERY

## 2019-01-07 PROCEDURE — 3008F PR BODY MASS INDEX (BMI) DOCUMENTED: ICD-10-PCS | Mod: CPTII,S$GLB,, | Performed by: SURGERY

## 2019-01-07 PROCEDURE — 99203 PR OFFICE/OUTPT VISIT, NEW, LEVL III, 30-44 MIN: ICD-10-PCS | Mod: S$GLB,,, | Performed by: SURGERY

## 2019-01-07 PROCEDURE — 99999 PR PBB SHADOW E&M-EST. PATIENT-LVL III: CPT | Mod: PBBFAC,,, | Performed by: SURGERY

## 2019-01-07 NOTE — PROGRESS NOTES
Patient ID: Medardo Vizcaino III is a 48 y.o. male.    Chief Complaint: Lump ( X's 3 right Arm)      HPI:  Medardo Vizcaino III is a 48 y.o. Male with hypothyroidism presents for evaluation of soft tissue mass x3 on right arm.  States that he has had mass on hand removed in 1983, and one near elbow in 2007.  He states he has had two masses for at least two years and one in his upper arm for the last 1.5 years.  They have been fairly stable in size, but increasing slightly over the last year.        Review of Systems   Constitutional: Negative for activity change and appetite change.   HENT: Negative for sinus pain, sneezing and tinnitus.    Eyes: Negative for photophobia, pain, discharge, redness, itching and visual disturbance.   Respiratory: Negative for apnea, cough, choking and chest tightness.    Cardiovascular: Negative for chest pain, palpitations and leg swelling.   Gastrointestinal: Negative for abdominal distention, abdominal pain, anal bleeding and blood in stool.   Endocrine: Negative for cold intolerance and heat intolerance.   Genitourinary: Negative for difficulty urinating and dysuria.   Musculoskeletal: Negative for arthralgias, back pain, gait problem and joint swelling.   Skin: Negative for color change, pallor, rash and wound.   Allergic/Immunologic: Negative for environmental allergies.   Neurological: Negative for dizziness, facial asymmetry and headaches.   Psychiatric/Behavioral: Negative for agitation, behavioral problems, confusion and decreased concentration.       Current Outpatient Medications   Medication Sig Dispense Refill    levothyroxine (SYNTHROID) 75 MCG tablet TAKE ONE TABLET BY MOUTH ONCE DAILY 90 tablet 3     No current facility-administered medications for this visit.        Review of patient's allergies indicates:   Allergen Reactions    Sulfa (sulfonamide antibiotics) Hives       Past Medical History:   Diagnosis Date    Acromioclavicular joint arthritis 3/20/2014     Hypothyroidism        Past Surgical History:   Procedure Laterality Date    ARTHROSCOPY, SHOULDER Right 3/26/2014    [facility]    ARTHROSCOPY, SHOULDER, WITH SUBACROMIAL SPACE DECOMPRESSION Right 3/26/2014    [facility]    EXCISION, CLAVICLE, DISTAL Right 3/26/2014    [facility]    FIXATION, TENDON Right 3/26/2014    [facility]    NASAL SEPTUM SURGERY      REPAIR, ROTATOR CUFF, ARTHROSCOPIC Right 3/26/2014    [facility]    SHOULDER ARTHROSCOPY      SHOULDER SURGERY      TONSILLECTOMY      TUMOR EXCISION      arm       Family History   Problem Relation Age of Onset    Hypertension Father     Pacemaker/defibrilator Father         Pacemaker    Diabetes Maternal Grandmother     Diabetes Maternal Grandfather     Heart disease Paternal Grandfather     Arthritis Mother     Hypothyroidism Mother     Hypothyroidism Sister     No Known Problems Paternal Grandmother     No Known Problems Brother     No Known Problems Maternal Aunt     No Known Problems Maternal Uncle     No Known Problems Paternal Aunt     No Known Problems Paternal Uncle     Amblyopia Neg Hx     Blindness Neg Hx     Cancer Neg Hx     Cataracts Neg Hx     Glaucoma Neg Hx     Macular degeneration Neg Hx     Retinal detachment Neg Hx     Strabismus Neg Hx     Stroke Neg Hx     Thyroid disease Neg Hx        Social History     Socioeconomic History    Marital status: Unknown     Spouse name: Not on file    Number of children: Not on file    Years of education: Not on file    Highest education level: Not on file   Social Needs    Financial resource strain: Not on file    Food insecurity - worry: Not on file    Food insecurity - inability: Not on file    Transportation needs - medical: Not on file    Transportation needs - non-medical: Not  on file   Occupational History    Not on file   Tobacco Use    Smoking status: Never Smoker   Substance and Sexual Activity    Alcohol use: Yes     Comment: one daily    Drug use: Not on file    Sexual activity: Yes     Partners: Female   Other Topics Concern    Not on file   Social History Narrative    Not on file       Vitals:    01/07/19 1431   BP: 127/85   Pulse: 62   Temp: 98.3 °F (36.8 °C)       Physical Exam   Constitutional: He is oriented to person, place, and time. He appears well-developed and well-nourished.   HENT:   Head: Normocephalic and atraumatic.   Eyes: Right eye exhibits no discharge. Left eye exhibits no discharge.   Neck: Normal range of motion. Neck supple. No thyromegaly present.   Cardiovascular: Normal rate and regular rhythm.   Pulmonary/Chest: Effort normal. No respiratory distress.   Abdominal: Soft. He exhibits no distension. There is no tenderness.   Musculoskeletal: Normal range of motion.   Neurological: He is alert and oriented to person, place, and time.   Skin: Skin is warm and dry.   3 masses on right arm.  2 on lower arm, 1 on upper arm.  All approx 2 cm.  All are soft and mobile.   Psychiatric: He has a normal mood and affect. His behavior is normal.   Vitals reviewed.      Assessment & Plan:    Medardo Vizcaino III is a 48 y.o. male who presents with soft tissue mass x 3.    -Masses are consistent with lipomas.  All are superficial, soft, and mobile.  -Will book patient for removal in minors clinic.      I have personally taken the history and examined this patient and agree with the resident's note as stated above.         Brenden Pina MD

## 2019-01-16 ENCOUNTER — OFFICE VISIT (OUTPATIENT)
Dept: INTERNAL MEDICINE | Facility: CLINIC | Age: 49
End: 2019-01-16
Payer: COMMERCIAL

## 2019-01-16 VITALS
BODY MASS INDEX: 25.9 KG/M2 | HEIGHT: 68 IN | HEART RATE: 67 BPM | DIASTOLIC BLOOD PRESSURE: 82 MMHG | WEIGHT: 170.88 LBS | OXYGEN SATURATION: 97 % | SYSTOLIC BLOOD PRESSURE: 126 MMHG

## 2019-01-16 DIAGNOSIS — R42 VERTIGO: ICD-10-CM

## 2019-01-16 DIAGNOSIS — H66.90 OTITIS MEDIA, UNSPECIFIED LATERALITY, UNSPECIFIED OTITIS MEDIA TYPE: Primary | ICD-10-CM

## 2019-01-16 PROCEDURE — 3008F BODY MASS INDEX DOCD: CPT | Mod: CPTII,S$GLB,, | Performed by: INTERNAL MEDICINE

## 2019-01-16 PROCEDURE — 99999 PR PBB SHADOW E&M-EST. PATIENT-LVL IV: ICD-10-PCS | Mod: PBBFAC,,, | Performed by: INTERNAL MEDICINE

## 2019-01-16 PROCEDURE — 99213 OFFICE O/P EST LOW 20 MIN: CPT | Mod: S$GLB,,, | Performed by: INTERNAL MEDICINE

## 2019-01-16 PROCEDURE — 3008F PR BODY MASS INDEX (BMI) DOCUMENTED: ICD-10-PCS | Mod: CPTII,S$GLB,, | Performed by: INTERNAL MEDICINE

## 2019-01-16 PROCEDURE — 99999 PR PBB SHADOW E&M-EST. PATIENT-LVL IV: CPT | Mod: PBBFAC,,, | Performed by: INTERNAL MEDICINE

## 2019-01-16 PROCEDURE — 99213 PR OFFICE/OUTPT VISIT, EST, LEVL III, 20-29 MIN: ICD-10-PCS | Mod: S$GLB,,, | Performed by: INTERNAL MEDICINE

## 2019-01-16 RX ORDER — TRIAMTERENE AND HYDROCHLOROTHIAZIDE 75; 50 MG/1; MG/1
1 TABLET ORAL DAILY
Qty: 90 TABLET | Refills: 1 | Status: SHIPPED | OUTPATIENT
Start: 2019-01-16 | End: 2023-01-03

## 2019-01-16 RX ORDER — AMOXICILLIN 875 MG/1
875 TABLET, FILM COATED ORAL EVERY 12 HOURS
Qty: 20 TABLET | Refills: 0 | Status: SHIPPED | OUTPATIENT
Start: 2019-01-16 | End: 2019-03-18 | Stop reason: ALTCHOICE

## 2019-01-16 RX ORDER — PREDNISONE 20 MG/1
20 TABLET ORAL SEE ADMIN INSTRUCTIONS
Qty: 24 TABLET | Refills: 0 | Status: SHIPPED | OUTPATIENT
Start: 2019-01-16 | End: 2019-01-22

## 2019-01-16 NOTE — PROGRESS NOTES
Subjective:       Patient ID: Medardo Vizcaino III is a 48 y.o. male.    Chief Complaint: Otalgia (Left for 2-3 weeks)    HPI:  2-3 weeks patient has had left ear pain that is worsening.  He had severe recurrent infections when he was younger.  He has even had some mild tinnitus and vertigo with this episode and is not responding to over-the-counter treatment.  He has had severe vertigo in the past and used triamterene and prednisone from his ENT.  He asked for refill of those meds.      Otalgia    There is pain in the left ear. This is a recurrent problem. The current episode started 1 to 4 weeks ago. The problem occurs constantly. The problem has been gradually worsening. The pain is at a severity of 6/10. The pain is mild. Associated symptoms include hearing loss and neck pain. Pertinent negatives include no abdominal pain, coughing, diarrhea, drainage, ear discharge, headaches, rash, rhinorrhea, sore throat or vomiting. He has tried NSAIDs and ear drops for the symptoms. The treatment provided no relief. His past medical history is significant for a chronic ear infection. There is no history of hearing loss or a tympanostomy tube.     Review of Systems   Constitutional: Negative for fever.   HENT: Positive for ear pain and hearing loss. Negative for ear discharge, rhinorrhea and sore throat.    Respiratory: Negative for cough.    Gastrointestinal: Negative for abdominal pain, diarrhea and vomiting.   Musculoskeletal: Positive for neck pain.   Skin: Negative for rash.   Neurological: Positive for dizziness. Negative for headaches.       Objective:      Physical Exam   Constitutional: He appears well-developed and well-nourished.   HENT:   Head: Normocephalic and atraumatic.   Right Ear: External ear normal.   Nose: Nose normal.   Mouth/Throat: Oropharynx is clear and moist.   Left TM is dull, red.  Landmarks somewhat distorted   Eyes: EOM are normal. Pupils are equal, round, and reactive to light.   Neck: Normal  range of motion. Neck supple.   Cardiovascular: Normal rate and regular rhythm.   Lymphadenopathy:     He has no cervical adenopathy.       Assessment:       1. Otitis media, unspecified laterality, unspecified otitis media type    2. Vertigo        Plan:       Medardo was seen today for otalgia.    Diagnoses and all orders for this visit:    Otitis media, unspecified laterality, unspecified otitis media type    Vertigo    Other orders  -     triamterene-hydrochlorothiazide 75-50 mg (MAXZIDE) 75-50 mg per tablet; Take 1 tablet by mouth once daily.  -     predniSONE (DELTASONE) 20 MG tablet; Take 1 tablet (20 mg total) by mouth As instructed.  -     amoxicillin (AMOXIL) 875 MG tablet; Take 1 tablet (875 mg total) by mouth every 12 (twelve) hours.        call if not improving

## 2019-01-17 ENCOUNTER — APPOINTMENT (RX ONLY)
Dept: URBAN - METROPOLITAN AREA CLINIC 98 | Facility: CLINIC | Age: 49
Setting detail: DERMATOLOGY
End: 2019-01-17

## 2019-01-17 DIAGNOSIS — D485 NEOPLASM OF UNCERTAIN BEHAVIOR OF SKIN: ICD-10-CM

## 2019-01-17 DIAGNOSIS — L57.8 OTHER SKIN CHANGES DUE TO CHRONIC EXPOSURE TO NONIONIZING RADIATION: ICD-10-CM

## 2019-01-17 DIAGNOSIS — D22 MELANOCYTIC NEVI: ICD-10-CM

## 2019-01-17 DIAGNOSIS — D17 BENIGN LIPOMATOUS NEOPLASM: ICD-10-CM

## 2019-01-17 DIAGNOSIS — D18.0 HEMANGIOMA: ICD-10-CM

## 2019-01-17 PROBLEM — D22.72 MELANOCYTIC NEVI OF LEFT LOWER LIMB, INCLUDING HIP: Status: ACTIVE | Noted: 2019-01-17

## 2019-01-17 PROBLEM — D48.5 NEOPLASM OF UNCERTAIN BEHAVIOR OF SKIN: Status: ACTIVE | Noted: 2019-01-17

## 2019-01-17 PROBLEM — E03.9 HYPOTHYROIDISM, UNSPECIFIED: Status: ACTIVE | Noted: 2019-01-17

## 2019-01-17 PROBLEM — D18.01 HEMANGIOMA OF SKIN AND SUBCUTANEOUS TISSUE: Status: ACTIVE | Noted: 2019-01-17

## 2019-01-17 PROBLEM — D17.21 BENIGN LIPOMATOUS NEOPLASM OF SKIN AND SUBCUTANEOUS TISSUE OF RIGHT ARM: Status: ACTIVE | Noted: 2019-01-17

## 2019-01-17 PROBLEM — D22.71 MELANOCYTIC NEVI OF RIGHT LOWER LIMB, INCLUDING HIP: Status: ACTIVE | Noted: 2019-01-17

## 2019-01-17 PROBLEM — D22.62 MELANOCYTIC NEVI OF LEFT UPPER LIMB, INCLUDING SHOULDER: Status: ACTIVE | Noted: 2019-01-17

## 2019-01-17 PROBLEM — D22.5 MELANOCYTIC NEVI OF TRUNK: Status: ACTIVE | Noted: 2019-01-17

## 2019-01-17 PROCEDURE — ? COUNSELING

## 2019-01-17 PROCEDURE — 11102 TANGNTL BX SKIN SINGLE LES: CPT

## 2019-01-17 PROCEDURE — ? OBSERVATION

## 2019-01-17 PROCEDURE — ? BIOPSY BY SHAVE METHOD

## 2019-01-17 PROCEDURE — ? SUNSCREEN RECOMMENDATIONS

## 2019-01-17 PROCEDURE — 99203 OFFICE O/P NEW LOW 30 MIN: CPT | Mod: 25

## 2019-01-17 ASSESSMENT — LOCATION ZONE DERM
LOCATION ZONE: LEG
LOCATION ZONE: TRUNK
LOCATION ZONE: ARM
LOCATION ZONE: NECK

## 2019-01-17 ASSESSMENT — LOCATION DETAILED DESCRIPTION DERM
LOCATION DETAILED: LEFT INFRAMAMMARY CREASE (INNER QUADRANT)
LOCATION DETAILED: LEFT POSTERIOR SHOULDER
LOCATION DETAILED: LEFT PROXIMAL POSTERIOR UPPER ARM
LOCATION DETAILED: RIGHT PROXIMAL POSTERIOR UPPER ARM
LOCATION DETAILED: RIGHT PROXIMAL PRETIBIAL REGION
LOCATION DETAILED: LEFT MEDIAL TRAPEZIAL NECK
LOCATION DETAILED: LEFT DISTAL PRETIBIAL REGION
LOCATION DETAILED: LEFT INFERIOR ANTERIOR NECK
LOCATION DETAILED: RIGHT PROXIMAL RADIAL DORSAL FOREARM
LOCATION DETAILED: LEFT PROXIMAL DORSAL FOREARM
LOCATION DETAILED: RIGHT DISTAL POSTERIOR UPPER ARM
LOCATION DETAILED: RIGHT MEDIAL UPPER BACK
LOCATION DETAILED: RIGHT LATERAL ELBOW
LOCATION DETAILED: PERIUMBILICAL SKIN
LOCATION DETAILED: RIGHT LATERAL INFERIOR CHEST
LOCATION DETAILED: SUPERIOR LUMBAR SPINE
LOCATION DETAILED: RIGHT ELBOW

## 2019-01-17 ASSESSMENT — LOCATION SIMPLE DESCRIPTION DERM
LOCATION SIMPLE: LEFT BREAST
LOCATION SIMPLE: LEFT ANTERIOR NECK
LOCATION SIMPLE: RIGHT ELBOW
LOCATION SIMPLE: CHEST
LOCATION SIMPLE: POSTERIOR NECK
LOCATION SIMPLE: RIGHT UPPER BACK
LOCATION SIMPLE: ABDOMEN
LOCATION SIMPLE: LEFT SHOULDER
LOCATION SIMPLE: LEFT PRETIBIAL REGION
LOCATION SIMPLE: RIGHT FOREARM
LOCATION SIMPLE: LOWER BACK
LOCATION SIMPLE: LEFT FOREARM
LOCATION SIMPLE: RIGHT PRETIBIAL REGION
LOCATION SIMPLE: LEFT UPPER ARM
LOCATION SIMPLE: RIGHT UPPER ARM

## 2019-01-17 NOTE — PROCEDURE: BIOPSY BY SHAVE METHOD
Wound Care: Petrolatum
Size Of Lesion In Cm: 0
Anesthesia Type: 1% lidocaine with epinephrine
Electrodesiccation Text: The wound bed was treated with electrodesiccation after the biopsy was performed.
Depth Of Biopsy: dermis
Bill 38460 For Specimen Handling/Conveyance To Laboratory?: no
Lab Facility: [id number]
Type Of Destruction Used: Curettage
Biopsy Type: H and E
Anesthesia Volume In Cc (Will Not Render If 0): 0.5
Billing Type: Third-Party Bill
Post-Care Instructions: I reviewed with the patient in detail post-care instructions. Patient is to keep the biopsy site dry overnight, and then apply bacitracin twice daily until healed. Patient may apply hydrogen peroxide soaks to remove any crusting.
Was A Bandage Applied: Yes
Dressing: bandage
Electrodesiccation And Curettage Text: The wound bed was treated with electrodesiccation and curettage after the biopsy was performed.
Silver Nitrate Text: The wound bed was treated with silver nitrate after the biopsy was performed.
Hemostasis: Drysol
Detail Level: Simple
Biopsy Method: double edge Personna blade
Curettage Text: The wound bed was treated with curettage after the biopsy was performed.
Notification Instructions: Patient will be notified of biopsy results. However, patient instructed to call the office if not contacted within 2 weeks.
Consent: Written consent was obtained and risks were reviewed including but not limited to scarring, infection, bleeding, scabbing, incomplete removal, nerve damage and allergy to anesthesia.
Cryotherapy Text: The wound bed was treated with cryotherapy after the biopsy was performed.
Lab: [id number]

## 2019-01-18 ENCOUNTER — PROCEDURE VISIT (OUTPATIENT)
Dept: SURGERY | Facility: CLINIC | Age: 49
End: 2019-01-18
Payer: COMMERCIAL

## 2019-01-18 VITALS
DIASTOLIC BLOOD PRESSURE: 75 MMHG | BODY MASS INDEX: 25.76 KG/M2 | HEART RATE: 56 BPM | SYSTOLIC BLOOD PRESSURE: 124 MMHG | HEIGHT: 68 IN | WEIGHT: 170 LBS

## 2019-01-18 DIAGNOSIS — D17.21 LIPOMA OF RIGHT FOREARM: Primary | ICD-10-CM

## 2019-01-18 PROCEDURE — 25075 EXC, TUMOR SOFT TISSUE: ICD-10-PCS | Mod: 59,RT,S$GLB, | Performed by: SURGERY

## 2019-01-18 PROCEDURE — 88304 TISSUE EXAM BY PATHOLOGIST: CPT | Performed by: PATHOLOGY

## 2019-01-18 PROCEDURE — 24075 EXC ARM/ELBOW LES SC < 3 CM: CPT | Mod: RT,S$GLB,, | Performed by: SURGERY

## 2019-01-18 PROCEDURE — 25075 EXC FOREARM LES SC < 3 CM: CPT | Mod: 59,RT,S$GLB, | Performed by: SURGERY

## 2019-01-18 PROCEDURE — 88304 TISSUE EXAM BY PATHOLOGIST: CPT | Mod: 26,,, | Performed by: PATHOLOGY

## 2019-01-18 PROCEDURE — 88304 TISSUE SPECIMEN TO PATHOLOGY, SURGERY: ICD-10-PCS | Mod: 26,,, | Performed by: PATHOLOGY

## 2019-01-18 PROCEDURE — 24075 EXC, TUMOR SOFT TISSUE: ICD-10-PCS | Mod: RT,S$GLB,, | Performed by: SURGERY

## 2019-01-18 NOTE — PROCEDURES
"Exc, Tumor Soft Tissue  Date/Time: 1/18/2019 2:55 PM  Performed by: Brenden Pina Jr., MD  Authorized by: Brenden Pina Jr., MD     Consent Done?:  Yes (Written)  Time out: Immediately prior to procedure a "time out" was called to verify the correct patient, procedure, equipment, support staff and site/side marked as required.      STAFF:  Assistants?: Yes    List of assistants:  Lee Hodge I was present for the entire procedure.  INDICATIONS:: lipoma.  LOCATION:  Body area:  Upper arm / elbowright    PREP:  Patient was prepped and draped in the normal sterile fashion.: lateral.    ANESTHESIA:  Anesthesia:  Local infiltration  Local anesthetic:  Lidocaine 1% without epinephrine    PROCEDURE DETAILS:  Excision type:  Tumor  Excision depth:  Subcutaneous  Excision size (cm):  1  Scalpel size:  15  Incision type:  Single straight  Specimens?: Yes    Specimens submitted to pathology. (lipoma)  Hemostasis was obtained.  Estimated blood loss (cc):  1  Wound closure:  Simple  Wound repair size (cm):  1  Sutures:  4-0 Monocryl  Sterile dressings:  Gauze, Mastisol, Steri-strips and Tegaderm  Post-op diagnosis: Same as pre-op diagnosis.  Needle, instrument, and sponge counts were correct.  Patient tolerated the procedure well with no immediate complications.  Post-operative instructions were provided for the patient.  Patient was discharged and will follow up for wound check and pathology results. and Patient was discharged and will follow up for wound check.  Exc, Tumor Soft Tissue  Date/Time: 1/18/2019 2:56 PM  Performed by: Brenden Pina Jr., MD  Authorized by: Brenden Pina Jr., MD     Consent Done?:  Yes (Written)  Time out: Immediately prior to procedure a "time out" was called to verify the correct patient, procedure, equipment, support staff and site/side marked as required.      STAFF:  Assistants?: Yes    List of assistants:  Lee Hodge  INDICATIONS:: lipoma.  LOCATION:  Body area:  " "Lower arm / wrist (medial)right    PREP:  Patient was prepped and draped in the normal sterile fashion.: lateral.    ANESTHESIA:  Anesthesia:  Local infiltration  Local anesthetic:  Lidocaine 1% without epinephrine    PROCEDURE DETAILS:  Excision type:  Tumor  Excision depth:  Subcutaneous  Excision size (cm):  1  Scalpel size:  15  Incision type:  Single straight  Specimens?: Yes    Specimens submitted to pathology. (lipoma)  Hemostasis was obtained.  Estimated blood loss (cc):  1  Wound closure:  Simple  Wound repair size (cm):  1  Sutures:  4-0 Monocryl  Sterile dressings:  Gauze, Mastisol, Steri-strips and Tegaderm  Post-op diagnosis: Same as pre-op diagnosis.  Needle, instrument, and sponge counts were correct.  Patient tolerated the procedure well with no immediate complications.  Post-operative instructions were provided for the patient.  Patient was discharged and will follow up for wound check and pathology results. and Patient was discharged and will follow up for wound check.  Exc, Tumor Soft Tissue  Date/Time: 1/18/2019 2:58 PM  Performed by: Brenden Pina Jr., MD  Authorized by: Brenden Pina Jr., MD     Consent Done?:  Yes (Written)  Time out: Immediately prior to procedure a "time out" was called to verify the correct patient, procedure, equipment, support staff and site/side marked as required.      STAFF:  Assistants?: Yes    List of assistants:  Lee Hodge I was present for the entire procedure.  INDICATIONS:: lipoma.  LOCATION:  Body area:  Lower arm / wrist (upper)right    PREP:  Patient was prepped and draped in the normal sterile fashion.: lateral.    ANESTHESIA:  Anesthesia:  Local infiltration  Local anesthetic:  Lidocaine 1% without epinephrine    PROCEDURE DETAILS:  Excision type:  Tumor  Excision depth:  Subcutaneous  Excision size (cm):  1  Scalpel size:  15  Incision type:  Single straight  Specimens?: Yes    Specimens submitted to pathology. (lipoma)  Hemostasis was " "obtained.  Estimated blood loss (cc):  1  Wound closure:  Simple  Wound repair size (cm):  1  Sutures:  4-0 Monocryl  Sterile dressings:  Gauze, Mastisol, Steri-strips and Tegaderm  Post-op diagnosis: Same as pre-op diagnosis.  Needle, instrument, and sponge counts were correct.  Patient tolerated the procedure well with no immediate complications.  Post-operative instructions were provided for the patient.  Patient was discharged and will follow up for wound check and pathology results. and Patient was discharged and will follow up for wound check.  Exc, Tumor Soft Tissue  Date/Time: 1/18/2019 3:00 PM  Performed by: Brenden Pina Jr., MD  Authorized by: Brenden Pina Jr., MD     Consent Done?:  Yes (Written)  Time out: Immediately prior to procedure a "time out" was called to verify the correct patient, procedure, equipment, support staff and site/side marked as required.      STAFF:  Assistants?: Yes    List of assistants:  Lee Hodge I was present for the entire procedure.  INDICATIONS:: lipoma.  LOCATION:  Body area:  Lower arm / wrist (lower)right    PREP:: lateral.    ANESTHESIA:  Local anesthetic:  Lidocaine 1% without epinephrine    PROCEDURE DETAILS:  Excision type:  Tumor  Excision depth:  Subcutaneous  Excision size (cm):  1  Scalpel size:  15  Incision type:  Single straight  Specimens?: Yes    Specimens submitted to pathology. (lipoma)  Hemostasis was obtained.  Estimated blood loss (cc):  1  Wound closure:  Simple  Wound repair size (cm):  1  Sutures:  4-0 Monocryl  Sterile dressings:  Gauze, Mastisol, Steri-strips and Tegaderm  Post-op diagnosis: Same as pre-op diagnosis.  Needle, instrument, and sponge counts were correct.  Patient tolerated the procedure well with no immediate complications.  Post-operative instructions were provided for the patient.  Patient was discharged and will follow up for wound check and pathology results. and Patient was discharged and will follow up for " wound check.

## 2019-01-29 ENCOUNTER — TELEPHONE (OUTPATIENT)
Dept: OTOLARYNGOLOGY | Facility: CLINIC | Age: 49
End: 2019-01-29

## 2019-01-29 ENCOUNTER — PATIENT MESSAGE (OUTPATIENT)
Dept: INTERNAL MEDICINE | Facility: CLINIC | Age: 49
End: 2019-01-29

## 2019-01-29 NOTE — TELEPHONE ENCOUNTER
----- Message from Mia Novak RN sent at 1/29/2019  1:48 PM CST -----  Contact: Patient   Can you see if you can get this patient in with Dr. Villanueva? Thanks  ----- Message -----  From: Raphael Samuels  Sent: 1/29/2019  12:45 PM  To: [facility] Clinical Staff, Rich [facility]TL[facility] Staff    Patient Requesting Sooner Appointment.     Reason for sooner appt.: Tinnitus flare up, inner ear pain   When is the first available appointment? 2/27/19  Communication Preference: [phone]   Additional Information: Patient states that he was seen with his PCP who recommended being seen with Dr Villanueva, would accept sooner if worked in with another provider

## 2019-02-01 ENCOUNTER — OFFICE VISIT (OUTPATIENT)
Dept: SURGERY | Facility: CLINIC | Age: 49
End: 2019-02-01
Payer: COMMERCIAL

## 2019-02-01 VITALS
DIASTOLIC BLOOD PRESSURE: 74 MMHG | WEIGHT: 170 LBS | BODY MASS INDEX: 25.76 KG/M2 | HEART RATE: 57 BPM | SYSTOLIC BLOOD PRESSURE: 109 MMHG | HEIGHT: 68 IN | TEMPERATURE: 99 F

## 2019-02-01 DIAGNOSIS — Z09 POSTOP CHECK: Primary | ICD-10-CM

## 2019-02-01 PROCEDURE — 99024 POSTOP FOLLOW-UP VISIT: CPT | Mod: S$GLB,,, | Performed by: SURGERY

## 2019-02-01 PROCEDURE — 99999 PR PBB SHADOW E&M-EST. PATIENT-LVL III: ICD-10-PCS | Mod: PBBFAC,,, | Performed by: SURGERY

## 2019-02-01 PROCEDURE — 99999 PR PBB SHADOW E&M-EST. PATIENT-LVL III: CPT | Mod: PBBFAC,,, | Performed by: SURGERY

## 2019-02-01 PROCEDURE — 99024 PR POST-OP FOLLOW-UP VISIT: ICD-10-PCS | Mod: S$GLB,,, | Performed by: SURGERY

## 2019-02-01 NOTE — PROGRESS NOTES
SUBJECTIVE:  The patient is a 48 y.o. y/o male 2 weeks s/p lipoma excision x4.  Path benign. He denies pain, fevers, chills, nausea, vomiting, diarrhea, or constipation. Eating well with normal appetite and bowel function. Denies redness around or drainage from incisions.    OBJECTIVE:  GEN: male in NAD  ABD: soft, non-tender, non-distended  INCISIONS: healing well without signs of infection or hernia    ASSESSMENT/PLAN:  Doing well 2 weeks s/p lipoma removal x4.   No restrictions  F/u PRN    C. Dmitry Suazo MD  PGY-5  Pager: [phone]    I have personally taken the history and examined this patient and agree with the resident's note as stated above.         Brenden Pina MD

## 2019-03-18 ENCOUNTER — HOSPITAL ENCOUNTER (OUTPATIENT)
Dept: RADIOLOGY | Facility: HOSPITAL | Age: 49
Discharge: HOME | End: 2019-03-18
Attending: INTERNAL MEDICINE
Payer: COMMERCIAL

## 2019-03-18 ENCOUNTER — OFFICE VISIT (OUTPATIENT)
Dept: RHEUMATOLOGY | Facility: CLINIC | Age: 49
End: 2019-03-18
Payer: COMMERCIAL

## 2019-03-18 VITALS
SYSTOLIC BLOOD PRESSURE: 123 MMHG | HEART RATE: 57 BPM | BODY MASS INDEX: 27.23 KG/M2 | DIASTOLIC BLOOD PRESSURE: 72 MMHG | HEIGHT: 68 IN | WEIGHT: 179.69 LBS

## 2019-03-18 DIAGNOSIS — G89.29 CHRONIC PAIN OF BOTH KNEES: ICD-10-CM

## 2019-03-18 DIAGNOSIS — M79.641 PAIN IN BOTH HANDS: Primary | ICD-10-CM

## 2019-03-18 DIAGNOSIS — M25.561 CHRONIC PAIN OF BOTH KNEES: ICD-10-CM

## 2019-03-18 DIAGNOSIS — M25.562 CHRONIC PAIN OF BOTH KNEES: ICD-10-CM

## 2019-03-18 DIAGNOSIS — M79.642 PAIN IN BOTH HANDS: ICD-10-CM

## 2019-03-18 DIAGNOSIS — M79.641 PAIN IN BOTH HANDS: ICD-10-CM

## 2019-03-18 DIAGNOSIS — M79.642 PAIN IN BOTH HANDS: Primary | ICD-10-CM

## 2019-03-18 DIAGNOSIS — F12.90 MARIJUANA USER: ICD-10-CM

## 2019-03-18 PROCEDURE — 99999 PR PBB SHADOW E&M-EST. PATIENT-LVL III: ICD-10-PCS | Mod: PBBFAC,,, | Performed by: INTERNAL MEDICINE

## 2019-03-18 PROCEDURE — 73130 XR HAND COMPLETE 3 VIEWS BILATERAL: ICD-10-PCS | Mod: 26,50,, | Performed by: RADIOLOGY

## 2019-03-18 PROCEDURE — 99204 PR OFFICE/OUTPT VISIT, NEW, LEVL IV, 45-59 MIN: ICD-10-PCS | Mod: S$GLB,,, | Performed by: INTERNAL MEDICINE

## 2019-03-18 PROCEDURE — 99999 PR PBB SHADOW E&M-EST. PATIENT-LVL III: CPT | Mod: PBBFAC,,, | Performed by: INTERNAL MEDICINE

## 2019-03-18 PROCEDURE — 3008F PR BODY MASS INDEX (BMI) DOCUMENTED: ICD-10-PCS | Mod: CPTII,S$GLB,, | Performed by: INTERNAL MEDICINE

## 2019-03-18 PROCEDURE — 73130 X-RAY EXAM OF HAND: CPT | Mod: 26,50,, | Performed by: RADIOLOGY

## 2019-03-18 PROCEDURE — 99204 OFFICE O/P NEW MOD 45 MIN: CPT | Mod: S$GLB,,, | Performed by: INTERNAL MEDICINE

## 2019-03-18 PROCEDURE — 3008F BODY MASS INDEX DOCD: CPT | Mod: CPTII,S$GLB,, | Performed by: INTERNAL MEDICINE

## 2019-03-18 PROCEDURE — 73130 X-RAY EXAM OF HAND: CPT | Mod: 50,TC

## 2019-03-18 ASSESSMENT — ROUTINE ASSESSMENT OF PATIENT INDEX DATA (RAPID3)
PAIN SCORE: 3
PATIENT GLOBAL ASSESSMENT SCORE: 2
FATIGUE SCORE: 0
WHEN YOU AWAKENED IN THE MORNING OVER THE LAST WEEK, PLEASE INDICATE THE AMOUNT OF TIME IT TAKES UNTIL YOU ARE AS LIMBER AS YOU WILL BE FOR THE DAY: 0-5 MINUTES
MDHAQ FUNCTION SCORE: .2
TOTAL RAPID3 SCORE: 1.89
AM STIFFNESS SCORE: 1, YES
PSYCHOLOGICAL DISTRESS SCORE: 1.1

## 2019-03-18 NOTE — PROGRESS NOTES
"Subjective:       Patient ID: Medardo Vizcaino III is a 48 y.o. male.    Chief Complaint: Joint pain    HPI:  Medardo Vizcaino III is a 48 y.o. male complains of joint since age 22.  Pain in knees, hips, elbows, shoulders, hands (MCP and PIP).  Pain is 2-3/10 "headache in a joint".  Improves with popping joint and going to chiropractor.  Naproxen and ibuprofen helps.  Pain worsened with over use.  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   0-5 minutes of morning stiffness    Has issues of fluid left ear that causes tinnitis.  Has been given amoxicillin, prednisone and diuretic to help.    Curved spine diagnosed by chiropractor. Improves with chiropractor adjustments.     Review of Systems   Constitutional: Negative.  Negative for fatigue.   HENT: Negative.    Eyes: Negative.    Respiratory: Negative.    Cardiovascular: Negative.    Gastrointestinal: Negative.    Endocrine: Negative.    Genitourinary: Negative.    Musculoskeletal: Positive for arthralgias.   Skin: Negative.    Allergic/Immunologic: Negative.    Neurological: Negative.    Hematological: Negative.    Psychiatric/Behavioral: Negative.          Objective:   /72   Pulse (!) 57   Ht 5' 8" (1.727 m)   Wt 81.5 kg (179 lb 10.8 oz)   BMI 27.32 kg/m²      Physical Exam   Constitutional: He is oriented to person, place, and time and well-developed, well-nourished, and in no distress.   HENT:   Head: Normocephalic and atraumatic.   Eyes: Conjunctivae and EOM are normal.   Neck: Neck supple.   Cardiovascular: Normal rate and regular rhythm.    Pulmonary/Chest: Effort normal and breath sounds normal.   Abdominal: Soft. Bowel sounds are normal.   Neurological: He is alert and oriented to person, place, and time.   Skin: Skin is warm and dry.     Psychiatric: Mood and affect normal.   Musculoskeletal:   Schober increases by 2 cm  28 joint count: 0 swollen and 0 tender            LABS    Component      Latest Ref Rng & Units 9/11/2018   WBC      3.90 - 12.70 " K/uL 6.24   RBC      4.60 - 6.20 M/uL 4.92   Hemoglobin      14.0 - 18.0 g/dL 15.2   Hematocrit      40.0 - 54.0 % 45.6   MCV      82 - 98 fL 93   MCH      27.0 - 31.0 pg 30.9   MCHC      32.0 - 36.0 g/dL 33.3   RDW      11.5 - 14.5 % 13.2   Platelets      150 - 350 K/uL 231   MPV      9.2 - 12.9 fL 10.3   Gran # (ANC)      1.8 - 7.7 K/uL 4.1   Lymph #      1.0 - 4.8 K/uL 1.6   Mono #      0.3 - 1.0 K/uL 0.4   Eos #      0.0 - 0.5 K/uL 0.1   Baso #      0.00 - 0.20 K/uL 0.03   nRBC      0 /100 WBC 0   Gran%      38.0 - 73.0 % 65.2   Lymph%      18.0 - 48.0 % 26.3   Mono%      4.0 - 15.0 % 6.7   Eosinophil%      0.0 - 8.0 % 1.1   Basophil%      0.0 - 1.9 % 0.5   Differential Method       Automated   Sodium      136 - 145 mmol/L 139   Potassium      3.5 - 5.1 mmol/L 3.9   Chloride      95 - 110 mmol/L 105   CO2      23 - 29 mmol/L 23   Glucose      70 - 110 mg/dL 94   BUN, Bld      6 - 20 mg/dL 9   Creatinine      0.5 - 1.4 mg/dL 1.0   Calcium      8.7 - 10.5 mg/dL 9.7   Total Protein      6.0 - 8.4 g/dL 7.1   Albumin      3.5 - 5.2 g/dL 4.2   Total Bilirubin      0.1 - 1.0 mg/dL 1.0   Alkaline Phosphatase      55 - 135 U/L 74   AST      10 - 40 U/L 29   ALT      10 - 44 U/L 38   Anion Gap      8 - 16 mmol/L 11   eGFR if African American      >60 mL/min/1.73 m:2 >60.0   eGFR if non African American      >60 mL/min/1.73 m:2 >60.0   Cholesterol      120 - 199 mg/dL 243 (H)   Triglycerides      30 - 150 mg/dL 71   HDL      40 - 75 mg/dL 61   LDL Cholesterol      63.0 - 159.0 mg/dL 167.8 (H)   HDL/Chol Ratio      20.0 - 50.0 % 25.1   Total Cholesterol/HDL Ratio      2.0 - 5.0 4.0   Non-HDL Cholesterol      mg/dL 182   TSH      0.400 - 4.000 uIU/mL 1.913     Assessment:       1.  Joint pain hands, knees, hips, elbows and shoulder  2.  Daily edible marijauna use    Plan:       1. X-ray hands  2. Labs  3.  Patient to obtain diagnosis from chiropractor  RTO in 4 months/prn

## 2019-03-19 ENCOUNTER — PATIENT MESSAGE (OUTPATIENT)
Dept: RHEUMATOLOGY | Facility: CLINIC | Age: 49
End: 2019-03-19

## 2019-03-20 ENCOUNTER — PATIENT MESSAGE (OUTPATIENT)
Dept: RHEUMATOLOGY | Facility: CLINIC | Age: 49
End: 2019-03-20

## 2019-05-21 ENCOUNTER — PATIENT MESSAGE (OUTPATIENT)
Dept: INTERNAL MEDICINE | Facility: CLINIC | Age: 49
End: 2019-05-21

## 2019-07-01 ENCOUNTER — PATIENT OUTREACH (OUTPATIENT)
Dept: ADMINISTRATIVE | Facility: OTHER | Age: 49
End: 2019-07-01

## 2019-07-02 ENCOUNTER — OFFICE VISIT (OUTPATIENT)
Dept: ORTHOPEDICS | Facility: CLINIC | Age: 49
End: 2019-07-02
Payer: COMMERCIAL

## 2019-07-02 VITALS
DIASTOLIC BLOOD PRESSURE: 73 MMHG | BODY MASS INDEX: 27.13 KG/M2 | WEIGHT: 179 LBS | HEIGHT: 68 IN | HEART RATE: 60 BPM | SYSTOLIC BLOOD PRESSURE: 115 MMHG

## 2019-07-02 DIAGNOSIS — M79.641 BILATERAL HAND PAIN: Primary | ICD-10-CM

## 2019-07-02 DIAGNOSIS — M79.642 BILATERAL HAND PAIN: Primary | ICD-10-CM

## 2019-07-02 PROCEDURE — 99204 OFFICE O/P NEW MOD 45 MIN: CPT | Mod: S$GLB,,, | Performed by: ORTHOPAEDIC SURGERY

## 2019-07-02 PROCEDURE — 99999 PR PBB SHADOW E&M-EST. PATIENT-LVL III: ICD-10-PCS | Mod: PBBFAC,,, | Performed by: ORTHOPAEDIC SURGERY

## 2019-07-02 PROCEDURE — 99999 PR PBB SHADOW E&M-EST. PATIENT-LVL III: CPT | Mod: PBBFAC,,, | Performed by: ORTHOPAEDIC SURGERY

## 2019-07-02 PROCEDURE — 3008F PR BODY MASS INDEX (BMI) DOCUMENTED: ICD-10-PCS | Mod: CPTII,S$GLB,, | Performed by: ORTHOPAEDIC SURGERY

## 2019-07-02 PROCEDURE — 99204 PR OFFICE/OUTPT VISIT, NEW, LEVL IV, 45-59 MIN: ICD-10-PCS | Mod: S$GLB,,, | Performed by: ORTHOPAEDIC SURGERY

## 2019-07-02 PROCEDURE — 3008F BODY MASS INDEX DOCD: CPT | Mod: CPTII,S$GLB,, | Performed by: ORTHOPAEDIC SURGERY

## 2019-07-02 NOTE — PROGRESS NOTES
"Subjective:      Medardo Vizcaino III is a 48 y.o. male who presents for evaluation of bilateral hand/finger pain. Onset was gradual, starting about several months ago. The pain is mild, worsens with movement, and is relieved by rest. There is no associated numbness, tingling in the hand. Evaluation to date: lab work: normal. Treatment to date: OTC analgesics. Pt knows great GM who all had arthritis. Hand issues, stiff joints. Pt is a  and feels all joints- knuckles mostly. Knees and ankles as well. Sees a chiropractor. Saw rheumatologist and was told to take ibuprofen. Pt states it has progressively gotten worse. Had shoulder surgery by Dr. Lawrence in past ( 2014 per pt). Pt still has pain in right shoulder, tingling in left shoulder blade as well- those will wake him up. Same with shoulders and elbows. Pt is a slide sleeper- pt has limited success in presenting pain.  Occ N/T in the back left shoulder plade. Nothing into the hands. Stiffness in long PIP joints, feels swollen.       Review of Systems  Pertinent items are noted in HPI.      Objective:      /73   Pulse 60   Ht 5' 8" (1.727 m)   Wt 81.2 kg (179 lb)   BMI 27.22 kg/m²   Right hand:  normal exam, no swelling, tenderness, instability; ligaments intact, full ROM both hands, wrists, and finger joints   Left hand:  Normal ROM, No skin deformity or joint deformity. TTP over MCP. Joints stable.      Imaging:  X-ray both hands: no fracture, dislocation, swelling or degenerative changes noted      Assessment:     Joint pain hands   Plan:     1) Dr. Lawrence for LE joint issues  2) Paraffin, anti-inflammatory diet, compound cream  3) rheum referral  "

## 2019-07-03 ENCOUNTER — PATIENT MESSAGE (OUTPATIENT)
Dept: ORTHOPEDICS | Facility: CLINIC | Age: 49
End: 2019-07-03

## 2019-07-03 DIAGNOSIS — M19.049 HAND ARTHRITIS: Primary | ICD-10-CM

## 2019-07-16 ENCOUNTER — OFFICE VISIT (OUTPATIENT)
Dept: OTOLARYNGOLOGY | Facility: CLINIC | Age: 49
End: 2019-07-16
Payer: COMMERCIAL

## 2019-07-16 ENCOUNTER — CLINICAL SUPPORT (OUTPATIENT)
Dept: AUDIOLOGY | Facility: CLINIC | Age: 49
End: 2019-07-16
Payer: COMMERCIAL

## 2019-07-16 VITALS
WEIGHT: 178.56 LBS | HEART RATE: 58 BPM | SYSTOLIC BLOOD PRESSURE: 137 MMHG | DIASTOLIC BLOOD PRESSURE: 89 MMHG | BODY MASS INDEX: 27.15 KG/M2

## 2019-07-16 DIAGNOSIS — H93.13 TINNITUS, BILATERAL: Primary | ICD-10-CM

## 2019-07-16 DIAGNOSIS — H93.13 TINNITUS AURIUM, BILATERAL: Primary | ICD-10-CM

## 2019-07-16 PROCEDURE — 92550 TYMPANOMETRY & REFLEX THRESH: CPT | Mod: S$GLB,,, | Performed by: AUDIOLOGIST

## 2019-07-16 PROCEDURE — 99243 OFF/OP CNSLTJ NEW/EST LOW 30: CPT | Mod: S$GLB,,, | Performed by: OTOLARYNGOLOGY

## 2019-07-16 PROCEDURE — 99999 PR PBB SHADOW E&M-EST. PATIENT-LVL III: ICD-10-PCS | Mod: PBBFAC,,, | Performed by: OTOLARYNGOLOGY

## 2019-07-16 PROCEDURE — 92557 PR COMPREHENSIVE HEARING TEST: ICD-10-PCS | Mod: S$GLB,,, | Performed by: AUDIOLOGIST

## 2019-07-16 PROCEDURE — 99243 PR OFFICE CONSULTATION,LEVEL III: ICD-10-PCS | Mod: S$GLB,,, | Performed by: OTOLARYNGOLOGY

## 2019-07-16 PROCEDURE — 92557 COMPREHENSIVE HEARING TEST: CPT | Mod: S$GLB,,, | Performed by: AUDIOLOGIST

## 2019-07-16 PROCEDURE — 92550 PR TYMPANOMETRY AND REFLEX THRESHOLD MEASUREMENTS: ICD-10-PCS | Mod: S$GLB,,, | Performed by: AUDIOLOGIST

## 2019-07-16 PROCEDURE — 99999 PR PBB SHADOW E&M-EST. PATIENT-LVL III: CPT | Mod: PBBFAC,,, | Performed by: OTOLARYNGOLOGY

## 2019-07-16 NOTE — PROGRESS NOTES
Subjective:       Patient ID: Medardo Vizcaino III is a 48 y.o. male.    Chief Complaint: Tinnitus  This patient is being seen in consultation with Dr. Chen for tinnitus.    Ringing in Ears:   Chronicity:  New  Onset:  More than 1 month ago  Progression since onset:  Waxing and waning  Frequency:  Constantly  Severity:  Mild  Ringing in ear characteristics:  Mild, muffled, difficult on telephone and worse background noise   Associated symptoms: ear pain and tinnitus.  No dizziness, no fever, no headaches and no rhinorrhea.  Aggravated by:  Noise  Treatments tried:  NothingNo dizziness.    Review of Systems   Constitutional: Negative for activity change, appetite change and fever.   HENT: Positive for ear pain, hearing loss and tinnitus. Negative for congestion, ear discharge, nosebleeds, postnasal drip, rhinorrhea and sneezing.    Eyes: Negative for redness and visual disturbance.   Respiratory: Negative for apnea, cough, shortness of breath and wheezing.    Cardiovascular: Negative for chest pain and palpitations.   Gastrointestinal: Negative for diarrhea and vomiting.   Genitourinary: Negative for difficulty urinating and frequency.   Musculoskeletal: Negative for arthralgias, back pain, gait problem and neck pain.   Skin: Negative for color change and rash.   Neurological: Negative for dizziness, speech difficulty, weakness and headaches.   Hematological: Negative for adenopathy. Does not bruise/bleed easily.   Psychiatric/Behavioral: Negative for agitation and behavioral problems.       Objective:        Constitutional:   Vital signs are normal. He appears well-developed and well-nourished. He is active. Normal speech.      Head:  Normocephalic and atraumatic. Salivary glands normal.  Facial strength is normal.      Ears:  Hearing normal to normal and whispered voice; external ear normal without scars, lesions, or masses; ear canal, tympanic membrane, and middle ear normal..     Nose:  Nose normal including  turbinates, nasal mucosa, sinuses and nasal septum.     Mouth/Throat  Oropharynx clear and moist without lesions or asymmetry and normal uvula midline.     Neck:  Neck normal without thyromegaly masses, asymmetry, normal tracheal structure, crepitus, and tenderness, thyroid normal, trachea normal, phonation normal and full range of motion with neck supple.     Psychiatric:   He has a normal mood and affect. His speech is normal and behavior is normal.     Neurological:   He has neurological normal, alert and oriented.     Skin:   No abrasions, lacerations, lesions, or rashes.         As a result of this patients history and examination findings, a comprehensive audiogram was ordered to determine the level of hearing/hearing loss.          Completely normal and symmetrical hearing with excellent speech discrimination scores and Type-A tympanograms.         Assessment:       1. Tinnitus aurium, bilateral        Plan:       1. Hearing conservation strongly recommended.  2. Trial of amplification is not currently indicated.  3. Re-check of hearing after 50 years of age.  4. F/U with PCP as per schedule.  5. Tinnitus education brochure provided to patient.

## 2019-07-30 ENCOUNTER — HOSPITAL ENCOUNTER (OUTPATIENT)
Dept: RADIOLOGY | Facility: HOSPITAL | Age: 49
Discharge: HOME | End: 2019-07-30
Attending: ORTHOPAEDIC SURGERY
Payer: COMMERCIAL

## 2019-07-30 ENCOUNTER — OFFICE VISIT (OUTPATIENT)
Dept: SPORTS MEDICINE | Facility: CLINIC | Age: 49
End: 2019-07-30
Payer: COMMERCIAL

## 2019-07-30 VITALS
SYSTOLIC BLOOD PRESSURE: 128 MMHG | BODY MASS INDEX: 26.98 KG/M2 | WEIGHT: 178 LBS | HEIGHT: 68 IN | DIASTOLIC BLOOD PRESSURE: 76 MMHG | HEART RATE: 69 BPM

## 2019-07-30 DIAGNOSIS — M25.571 BILATERAL ANKLE PAIN: ICD-10-CM

## 2019-07-30 DIAGNOSIS — M25.561 PAIN IN BOTH KNEES, UNSPECIFIED CHRONICITY: ICD-10-CM

## 2019-07-30 DIAGNOSIS — M25.562 PAIN IN BOTH KNEES, UNSPECIFIED CHRONICITY: Primary | ICD-10-CM

## 2019-07-30 DIAGNOSIS — M25.561 PAIN IN BOTH KNEES, UNSPECIFIED CHRONICITY: Primary | ICD-10-CM

## 2019-07-30 DIAGNOSIS — M25.562 PAIN IN BOTH KNEES, UNSPECIFIED CHRONICITY: ICD-10-CM

## 2019-07-30 DIAGNOSIS — M25.572 BILATERAL ANKLE PAIN: ICD-10-CM

## 2019-07-30 PROCEDURE — 73564 X-RAY EXAM KNEE 4 OR MORE: CPT | Mod: 26,50,, | Performed by: RADIOLOGY

## 2019-07-30 PROCEDURE — 99999 PR PBB SHADOW E&M-EST. PATIENT-LVL III: CPT | Mod: PBBFAC,,, | Performed by: ORTHOPAEDIC SURGERY

## 2019-07-30 PROCEDURE — 99214 OFFICE O/P EST MOD 30 MIN: CPT | Mod: S$GLB,,, | Performed by: ORTHOPAEDIC SURGERY

## 2019-07-30 PROCEDURE — 99999 PR PBB SHADOW E&M-EST. PATIENT-LVL III: ICD-10-PCS | Mod: PBBFAC,,, | Performed by: ORTHOPAEDIC SURGERY

## 2019-07-30 PROCEDURE — 3008F PR BODY MASS INDEX (BMI) DOCUMENTED: ICD-10-PCS | Mod: CPTII,S$GLB,, | Performed by: ORTHOPAEDIC SURGERY

## 2019-07-30 PROCEDURE — 73564 X-RAY EXAM KNEE 4 OR MORE: CPT | Mod: TC,50,FY,PO

## 2019-07-30 PROCEDURE — 3008F BODY MASS INDEX DOCD: CPT | Mod: CPTII,S$GLB,, | Performed by: ORTHOPAEDIC SURGERY

## 2019-07-30 PROCEDURE — 99214 PR OFFICE/OUTPT VISIT, EST, LEVL IV, 30-39 MIN: ICD-10-PCS | Mod: S$GLB,,, | Performed by: ORTHOPAEDIC SURGERY

## 2019-07-30 PROCEDURE — 73564 XR KNEE ORTHO BILAT WITH FLEXION: ICD-10-PCS | Mod: 26,50,, | Performed by: RADIOLOGY

## 2019-07-30 NOTE — PROGRESS NOTES
CC: Bilateral knee pain    48 y.o. Male with a history of bilateral knee pain without PARAMJIT.  He lost 25 lbs about two years with increased activity.  He walking about 50 miles a week for exercise.  Reports bilateral knee and ankle pain.    He reports that the pain and weakness. It also bothers him at night.    - mechanical symptoms, - instability    Is affecting ADLs.  Pain is 3/10 at it's worst.    REVIEW OF SYSTEMS:  Constitution: Negative. Negative for chills, fever and night sweats.   HENT: Negative for congestion and headaches.    Eyes: Negative for blurred vision, left vision loss and right vision loss.   Cardiovascular: Negative for chest pain and syncope.   Respiratory: Negative for cough and shortness of breath.    Endocrine: Negative for polydipsia, polyphagia and polyuria.   Hematologic/Lymphatic: Negative for bleeding problem. Does not bruise/bleed easily.   Skin: Negative for dry skin, itching and rash.   Musculoskeletal: Negative for falls. Positive for left knee pain and  muscle weakness.   Gastrointestinal: Negative for abdominal pain and bowel incontinence.   Genitourinary: Negative for bladder incontinence and nocturia.   Neurological: Negative for disturbances in coordination, loss of balance and seizures.   Psychiatric/Behavioral: Negative for depression. The patient does not have insomnia.    Allergic/Immunologic: Negative for hives and persistent infections.     PAST MEDICAL HISTORY:    Past Medical History:   Diagnosis Date    Acromioclavicular joint arthritis 3/20/2014    Hypothyroidism        PAST SURGICAL HISTORY:   Past Surgical History:   Procedure Laterality Date    ARTHROSCOPY, SHOULDER Right 3/26/2014    [facility]    ARTHROSCOPY, SHOULDER, WITH SUBACROMIAL SPACE DECOMPRESSION Right 3/26/2014    [facility]    EXCISION, CLAVICLE, DISTAL Right 3/26/2014    [facility]     FIXATION, TENDON Right 3/26/2014    [facility]    NASAL SEPTUM SURGERY      REPAIR, ROTATOR CUFF, ARTHROSCOPIC Right 3/26/2014    [facility]    SHOULDER ARTHROSCOPY      SHOULDER SURGERY      TONSILLECTOMY      TUMOR EXCISION      arm; lipoma       FAMILY HISTORY:   Family History   Problem Relation Age of Onset    Hypertension Father     Pacemaker/defibrilator Father         Pacemaker    Diabetes Maternal Grandmother     Diabetes Maternal Grandfather     Heart disease Paternal Grandfather     Arthritis Mother     Hypothyroidism Mother     Hypothyroidism Sister     No Known Problems Paternal Grandmother     No Known Problems Brother     No Known Problems Maternal Aunt     No Known Problems Maternal Uncle     No Known Problems Paternal Aunt     No Known Problems Paternal Uncle     Amblyopia Neg Hx     Blindness Neg Hx     Cancer Neg Hx     Cataracts Neg Hx     Glaucoma Neg Hx     Macular degeneration Neg Hx     Retinal detachment Neg Hx     Strabismus Neg Hx     Stroke Neg Hx     Thyroid disease Neg Hx     Psoriasis Neg Hx     Inflammatory bowel disease Neg Hx     Lupus Neg Hx        SOCIAL HISTORY:   Social History     Socioeconomic History    Marital status: Unknown     Spouse name: Not on file    Number of children: Not on file    Years of education: Not on file    Highest education level: Not on file   Occupational History    Not on file   Social Needs    Financial resource strain: Not on file    Food insecurity:     Worry: Not on file     Inability: Not on file    Transportation needs:     Medical: Not on file     Non-medical: Not on file   Tobacco Use    Smoking status: Never Smoker    Smokeless tobacco: Never Used    Tobacco comment: data analysis; ; no children   Substance and Sexual Activity    Alcohol use: Yes     Comment: one daily    Drug use: Yes     Types: Marijuana      "Comment: daily since age 35; helps emotions and joint pain    Sexual activity: Yes     Partners: Female     Birth control/protection: Condom   Lifestyle    Physical activity:     Days per week: Not on file     Minutes per session: Not on file    Stress: Not on file   Relationships    Social connections:     Talks on phone: Not on file     Gets together: Not on file     Attends [employer] service: Not on file     Active member of club or organization: Not on file     Attends meetings of clubs or organizations: Not on file     Relationship status: Not on file   Other Topics Concern    Not on file   Social History Narrative    Not on file       MEDICATIONS:     Current Outpatient Medications:     diclofenac sodium (PENNSAID) 2 % SoPk, Apply 40 mg topically 2 (two) times daily., Disp: 112 g, Rfl: 1    levothyroxine (SYNTHROID) 75 MCG tablet, TAKE ONE TABLET BY MOUTH ONCE DAILY, Disp: 90 tablet, Rfl: 3    triamterene-hydrochlorothiazide 75-50 mg (MAXZIDE) 75-50 mg per tablet, Take 1 tablet by mouth once daily., Disp: 90 tablet, Rfl: 1    ALLERGIES:   Review of patient's allergies indicates:   Allergen Reactions    Sulfa (sulfonamide antibiotics) Hives       VITAL SIGNS:   /76   Pulse 69   Ht 5' 8" (1.727 m)   Wt 80.7 kg (178 lb)   BMI 27.06 kg/m²      PHYSICAL EXAMINATION  General:  The patient is alert and oriented x 3.  Mood is pleasant.  Observation of ears, eyes and nose reveal no gross abnormalities.  No labored breathing observed.    BILATERAL KNEE EXAMINATION     OBSERVATION / INSPECTION   Gait:   Nonantalgic   Alignment:  Neutral   Scars:   None   Muscle atrophy: Mild  Effusion:  None   Warmth:  None   Discoloration:   none     TENDERNESS / CREPITUS (T / C):          T / C      T / C   Patella   - / -   Lateral joint line   - / -   Peripatellar medial  -  Medial joint line    - / -   Peripatellar lateral -  Medial plica   - / -   Patellar tendon -   Popliteal fossa  - / -   Quad tendon   - "   Gastrocnemius   -   Prepatellar Bursa - / -   Quadricep   -   Tibial tubercle  -  Thigh/hamstring  -   Pes anserine/HS -  Fibula    -   ITB   - / -  Tibia     -   Tib/fib joint  - / -  LCL    -     MFC   - / -   MCL: Proximal  -    LFC   - / -    Distal   -          ROM: (* = pain)  PASSIVE   ACTIVE    Left :   5 / 0 / 145   5 / 0 / 145     Right :    5 / 0 / 145   5 / 0 / 145    Patellofemoral examination:  See above noted areas of tenderness.   Patella position    Subluxation / dislocation: Centered           Sup. / Inf;   Normal   Crepitus (PF):    Absent   Patellar Mobility:       Medial-lateral:   Normal    Superior-inferior:  Normal    Inferior tilt   Normal    Patellar tendon:  Normal   Lateral tilt:    Normal   J-sign:     None   Patellofemoral grind:   No pain       MENISCAL SIGNS:     Pain on terminal extension:  -  Pain on terminal flexion:  -  Leoncios maneuver:  -  Squat     + (for pain)    LIGAMENT EXAMINATION:  ACL / Lachman:  normal (-1 to 2mm)    PCL-Post.  drawer: normal 0 to 2mm  MCL- Valgus:  normal 0 to 2mm  LCL- Varus:  normal 0 to 2mm  Pivot shift: normal (Equal)   Dial Test: difference c/w other side   At 30° flexion: normal (< 5°)    At 90° flexion: normal (< 5°)   Reverse Pivot Shift:   normal (Equal)     STRENGTH: (* = with pain) PAINFUL SIDE   Quadricep   5/5   Hamstrin/5    EXTREMITY NEURO-VASCULAR EXAMINATION:   Sensation:  Grossly intact to light touch all dermatomal regions.   Motor Function:  Fully intact motor function at hip, knee, foot and ankle    DTRs;  quadriceps and  achilles 2+.  No clonus and downgoing Babinski.    Vascular status:  DP and PT pulses 2+, brisk capillary refill, symmetric.     ASSESSMENT:    Bilateral knee pain  Bilateral ankle pain    PLAN:   1. Physical therapy for hip/core  2. Referral to podiatry for custom orthotics  3. Follow up as needed    All questions were answered, pt will contact us for questions or concerns in the  interim.

## 2019-07-31 ENCOUNTER — TELEPHONE (OUTPATIENT)
Dept: PODIATRY | Facility: CLINIC | Age: 49
End: 2019-07-31

## 2019-08-05 ENCOUNTER — PATIENT OUTREACH (OUTPATIENT)
Dept: ADMINISTRATIVE | Facility: OTHER | Age: 49
End: 2019-08-05

## 2019-08-06 ENCOUNTER — TELEPHONE (OUTPATIENT)
Dept: PODIATRY | Facility: CLINIC | Age: 49
End: 2019-08-06

## 2019-08-06 NOTE — TELEPHONE ENCOUNTER
Called and spoke with patient Patient stated he did not schedule appointment with Dr Mckeon for 8-7-19  does not want to be charged for visit. Appointment is canceled.                ----- Message from Shagufta Diaz sent at 8/6/2019  9:57 AM CDT -----  Contact: Self  Pt is calling because he is scheduled to be seen tomorrow, but says he did not make the appt and whomever made it should cancel it because he does not want to be charged for not making the appt.   asked the pt if he wanted it cancelled and all he would say is whomever made it, should cancel it.    He can be reached at [phone], or [phone]..    Thank you.

## 2019-09-06 NOTE — PROGRESS NOTES
[facility]  See PT Initial Evaluation/ Discharge for this patient in the Plan of Care.    Rebecca Garza, PT, DPT

## 2019-09-09 ENCOUNTER — LAB VISIT (OUTPATIENT)
Dept: LAB | Facility: HOSPITAL | Age: 49
End: 2019-09-09
Attending: INTERNAL MEDICINE
Payer: COMMERCIAL

## 2019-09-09 ENCOUNTER — OFFICE VISIT (OUTPATIENT)
Dept: INTERNAL MEDICINE | Facility: CLINIC | Age: 49
End: 2019-09-09
Payer: COMMERCIAL

## 2019-09-09 VITALS
OXYGEN SATURATION: 98 % | HEART RATE: 58 BPM | SYSTOLIC BLOOD PRESSURE: 114 MMHG | DIASTOLIC BLOOD PRESSURE: 72 MMHG | BODY MASS INDEX: 24.96 KG/M2 | WEIGHT: 164.69 LBS | HEIGHT: 68 IN

## 2019-09-09 DIAGNOSIS — E03.4 HYPOTHYROIDISM DUE TO ACQUIRED ATROPHY OF THYROID: ICD-10-CM

## 2019-09-09 DIAGNOSIS — M25.511 CHRONIC PAIN OF BOTH SHOULDERS: ICD-10-CM

## 2019-09-09 DIAGNOSIS — Z00.00 ROUTINE PHYSICAL EXAMINATION: ICD-10-CM

## 2019-09-09 DIAGNOSIS — Z00.00 ROUTINE PHYSICAL EXAMINATION: Primary | ICD-10-CM

## 2019-09-09 DIAGNOSIS — G89.29 CHRONIC PAIN OF BOTH SHOULDERS: ICD-10-CM

## 2019-09-09 DIAGNOSIS — M25.512 CHRONIC PAIN OF BOTH SHOULDERS: ICD-10-CM

## 2019-09-09 LAB
ALBUMIN SERPL BCP-MCNC: 4.4 G/DL (ref 3.5–5.2)
ALP SERPL-CCNC: 70 U/L (ref 55–135)
ALT SERPL W/O P-5'-P-CCNC: 24 U/L (ref 10–44)
ANION GAP SERPL CALC-SCNC: 9 MMOL/L (ref 8–16)
AST SERPL-CCNC: 19 U/L (ref 10–40)
BILIRUB SERPL-MCNC: 0.9 MG/DL (ref 0.1–1)
BUN SERPL-MCNC: 13 MG/DL (ref 6–20)
CALCIUM SERPL-MCNC: 9.8 MG/DL (ref 8.7–10.5)
CHLORIDE SERPL-SCNC: 106 MMOL/L (ref 95–110)
CHOLEST SERPL-MCNC: 233 MG/DL (ref 120–199)
CHOLEST/HDLC SERPL: 3.4 {RATIO} (ref 2–5)
CO2 SERPL-SCNC: 26 MMOL/L (ref 23–29)
CREAT SERPL-MCNC: 0.9 MG/DL (ref 0.5–1.4)
EST. GFR  (AFRICAN AMERICAN): >60 ML/MIN/1.73 M^2
EST. GFR  (NON AFRICAN AMERICAN): >60 ML/MIN/1.73 M^2
GLUCOSE SERPL-MCNC: 94 MG/DL (ref 70–110)
HDLC SERPL-MCNC: 68 MG/DL (ref 40–75)
HDLC SERPL: 29.2 % (ref 20–50)
LDLC SERPL CALC-MCNC: 154 MG/DL (ref 63–159)
NONHDLC SERPL-MCNC: 165 MG/DL
POTASSIUM SERPL-SCNC: 4 MMOL/L (ref 3.5–5.1)
PROT SERPL-MCNC: 7.1 G/DL (ref 6–8.4)
SODIUM SERPL-SCNC: 141 MMOL/L (ref 136–145)
TRIGL SERPL-MCNC: 55 MG/DL (ref 30–150)
TSH SERPL DL<=0.005 MIU/L-ACNC: 1.5 UIU/ML (ref 0.4–4)

## 2019-09-09 PROCEDURE — 99396 PR PREVENTIVE VISIT,EST,40-64: ICD-10-PCS | Mod: S$GLB,,, | Performed by: INTERNAL MEDICINE

## 2019-09-09 PROCEDURE — 99396 PREV VISIT EST AGE 40-64: CPT | Mod: S$GLB,,, | Performed by: INTERNAL MEDICINE

## 2019-09-09 PROCEDURE — 80061 LIPID PANEL: CPT

## 2019-09-09 PROCEDURE — 84443 ASSAY THYROID STIM HORMONE: CPT

## 2019-09-09 PROCEDURE — 36415 COLL VENOUS BLD VENIPUNCTURE: CPT

## 2019-09-09 PROCEDURE — 99999 PR PBB SHADOW E&M-EST. PATIENT-LVL III: ICD-10-PCS | Mod: PBBFAC,,, | Performed by: INTERNAL MEDICINE

## 2019-09-09 PROCEDURE — 80053 COMPREHEN METABOLIC PANEL: CPT

## 2019-09-09 PROCEDURE — 99999 PR PBB SHADOW E&M-EST. PATIENT-LVL III: CPT | Mod: PBBFAC,,, | Performed by: INTERNAL MEDICINE

## 2019-09-09 NOTE — PROGRESS NOTES
Subjective:       Patient ID: Medardo Vizcaino III is a 48 y.o. male.    Chief Complaint: Annual Exam    HPI:  Patient comes in for annual exam.  He is continuing to see Orthopedics for both hands knees and lower extremities.  He is even going to do some physical therapy.  He is supposed to see Rheumatology again for the hands.  He does  and  work so he types a lot.  He has been walking 4-5 miles 5 days a week and has lost weight.  We will reassess his labs.  Prescriptions are up-to-date.  No recent vertigo.  Denies any change in personal or family history.    Review of Systems   Constitutional: Negative for chills, fatigue, fever and unexpected weight change.   HENT: Negative for nosebleeds and trouble swallowing.    Eyes: Negative for pain and visual disturbance.   Respiratory: Negative for cough, shortness of breath and wheezing.    Cardiovascular: Negative for chest pain and palpitations.   Gastrointestinal: Negative for abdominal pain, constipation, diarrhea, nausea and vomiting.   Genitourinary: Negative for difficulty urinating and hematuria.   Musculoskeletal: Positive for arthralgias and joint swelling. Negative for neck pain.   Skin: Negative for rash.   Neurological: Negative for dizziness and headaches.   Hematological: Does not bruise/bleed easily.   Psychiatric/Behavioral: Negative for dysphoric mood, sleep disturbance and suicidal ideas.       Objective:      Physical Exam   Constitutional: He is oriented to person, place, and time. He appears well-developed and well-nourished. No distress.   HENT:   Head: Normocephalic and atraumatic.   Right Ear: External ear normal.   Left Ear: External ear normal.   Mouth/Throat: Oropharynx is clear and moist. No oropharyngeal exudate.   TM's clear, pharynx clear   Eyes: Pupils are equal, round, and reactive to light. Conjunctivae and EOM are normal. No scleral icterus.   Neck: Normal range of motion. Neck supple. No thyromegaly present.    No supraclavicular nodes palpated   Cardiovascular: Normal rate, regular rhythm and normal heart sounds.   No murmur heard.  Pulmonary/Chest: Effort normal and breath sounds normal. He has no wheezes.   Abdominal: Soft. Bowel sounds are normal. He exhibits no mass. There is no tenderness.   Musculoskeletal: He exhibits tenderness (Mild hand stiffness, mild knee tenderness). He exhibits no edema.   Lymphadenopathy:     He has no cervical adenopathy.   Neurological: He is alert and oriented to person, place, and time.   Skin: No rash noted. No erythema. No pallor.   Psychiatric: He has a normal mood and affect. His behavior is normal.       Assessment:       1. Routine physical examination    2. Chronic pain of both shoulders    3. Hypothyroidism due to acquired atrophy of thyroid        Plan:       Medardo was seen today for annual exam.    Diagnoses and all orders for this visit:    Routine physical examination  -     Lipid panel; Future  -     TSH; Future  -     Comprehensive metabolic panel; Future    Chronic pain of both shoulders  -     Lipid panel; Future  -     TSH; Future  -     Comprehensive metabolic panel; Future    Hypothyroidism due to acquired atrophy of thyroid  -     Lipid panel; Future  -     TSH; Future  -     Comprehensive metabolic panel; Future          Review studies, follow-up annually

## 2019-09-10 ENCOUNTER — TELEPHONE (OUTPATIENT)
Dept: INTERNAL MEDICINE | Facility: CLINIC | Age: 49
End: 2019-09-10

## 2019-09-10 ENCOUNTER — CLINICAL SUPPORT (OUTPATIENT)
Dept: REHABILITATION | Facility: HOSPITAL | Age: 49
End: 2019-09-10
Attending: ORTHOPAEDIC SURGERY
Payer: COMMERCIAL

## 2019-09-10 DIAGNOSIS — M62.89 MUSCLE TONE INCREASED: ICD-10-CM

## 2019-09-10 PROCEDURE — 97161 PT EVAL LOW COMPLEX 20 MIN: CPT | Mod: PO

## 2019-09-10 PROCEDURE — 97110 THERAPEUTIC EXERCISES: CPT | Mod: PO

## 2019-09-10 NOTE — PLAN OF CARE
"[facility]  Physical Therapy Initial Evaluation/ Discharge Note    Name: Medardo Vizcaino III  Clinic Number: [id number]    Therapy Diagnosis:   Encounter Diagnosis   Name Primary?    Muscle tone increased      Physician: Jef Lawrence MD    Physician Orders: PT Eval and Treat   Medical Diagnosis from Referral:   M25.561,M25.562 (ICD-10-CM) - Pain in both knees, unspecified chronicity   M25.571,M25.572 (ICD-10-CM) - Bilateral ankle pain   Evaluation Date: 9/10/2019  Authorization Period Expiration: 7/29/2020  Plan of Care Expiration: 9/10/2019  Visit # / Visits authorized: 1/1    Time In: 515  Time Out: 550    Total Billable Time: 30 minutes    Precautions: Standard    Subjective   Date of onset: year and a half  History of current condition - Medardo reports: he has "ramped up urban hiking" over the past year and a half. [place] 45-50 miles a week. The knee and ankle pain started when he began the ramp up. He reports he feels sore. He was wearing old tennis shoes and thinks this affected him. Pain is one both knees and both ankles; both sides equally. He is right side dominant. He says that today he only wants a list of exercises to maintain and does not want PT because he thinks that he can maintain them at home by himself.         Medical History:   Past Medical History:   Diagnosis Date    Acromioclavicular joint arthritis 3/20/2014    Hypothyroidism        Surgical History:   Medardo Vizcaino III  has a past surgical history that includes Tumor excision; Nasal septum surgery; Tonsillectomy; Shoulder surgery; and Shoulder arthroscopy.    Medications:   Medardo has a current medication list which includes the following prescription(s): diclofenac sodium, levothyroxine, and triamterene-hydrochlorothiazide 75-50 mg.    Allergies:   Review of patient's allergies indicates:   Allergen Reactions    Sulfa (sulfonamide antibiotics) Hives    "     Imaging, x-ray: 7/30/2019  Joint spaces are preserved.  There is mild osteoarthrosis at intercondylar notches and the anterior margins of the femoral condyles.  Small spurs are present at the superior poles of the patellas.   no fracture, dislocation, radiopaque retained foreign body, lytic or blastic lesion, erosion or chondrocalcinosis.    Prior Therapy:  previous therapy for shoulder 2014  Social History: single story   Occupation: data analysit for [employer]   Prior Level of Function: independent   Current Level of Function: limited in recreational activities     Pain:  Current 2/10, worst 7/10, best 1/10   Location: bilateral knees and ankles   Description: Tight   Aggravating Factors: increased walking  Easing Factors: sitz bath with tea tree oil; resting     Pts goals: learn new stretches    Objective     Observation: enters clinic energetic    Posture:  Normal    Gait: no noted deficits       Sensation: Intact     Lower Extremity Strength (graded 0-5 out of 5)    Right LE Left LE   Hip flexion: 5/5 5/5   Hip ER: 5/5 5/5   Hip IR: 5/5 5/5   Knee extension:  5/5 5/5   Ankle dorsiflexion: 5/5 5/5   Posterior fibers of Gluteus Medius 4/5 4/5   Hip extension: 4+/5 4+/5   Knee flexion:  5/5 5/5   Glute max 4+/5 4+/5   Ankle plantarflexion: 5/5 5/5         Function:   Double Leg squat: normal; some valgus   SL squat: slight valgus        Flexibility: (min, mod, severe limitation)   Ely's test: (+) R   Hamstring: mod limitations bilaterally   Elizabeth's test: (+) bilaterally   Gastrocnemius and soleus: min limitations bilaterally         TREATMENT   Treatment Time In: 535  Treatment Time Out: 550  Total Treatment time separate from Evaluation: 30 minutes    Medardo received therapeutic exercises to develop strength, ROM and flexibility for 30  minutes including:  Gastroc stretch at wall  Soleus stretch at wall  Plantar fascia stretch on step  ITB supine stretch with band  Hamstring stretch with band   Sidelying  clams  sidelying hip abd  TrA activation       Home Exercises and Patient Education Provided    Education provided:   - exercise execution for injury prevention during hiking     Written Home Exercises Provided: yes.  Exercises were reviewed and Medardo was able to demonstrate them prior to the end of the session.  Medardo demonstrated good  understanding of the education provided.     See EMR under Patient Instructions for exercises provided 9/10/2019.    Assessment   Medardo is a 48 y.o. male referred to outpatient Physical Therapy with a medical diagnosis of bilateral knee and ankle pain. Pt presents with limitations in LE flexibility and hip pain. Pt requests that he only is seen today and does not want to schedule any follow up visits. He just wants to learn new exercises to warm up and prevent injury during his long hikes. He is educated on proper LE flexibility stretches as well as glute med and hip strengthening exercises. He is educated to consult with [facility] for a follow up with a  if new questions arise. He is discharged from therapy today.         Plan of care discussed with patient: Yes  Pt's spiritual, cultural and educational needs considered and patient is agreeable to the plan of care and goals as stated below:       Medical Necessity is demonstrated by the following  History  Co-morbidities and personal factors that may impact the plan of care Co-morbidities:   OA and hypothyroidism    Personal Factors:   no deficits     low   Examination  Body Structures and Functions, activity limitations and participation restrictions that may impact the plan of care Body Regions:   lower extremities    Body Systems:    ROM  strength    Participation Restrictions:   hiking     Activity limitations:   Learning and applying knowledge  no deficits    General Tasks and Commands  no deficits    Communication  no deficits    Mobility  no deficits    Self care  no  deficits    Domestic Life  no deficits    Interactions/Relationships  no deficits    Life Areas  no deficits    Community and Social Life  no deficits         low   Clinical Presentation stable and uncomplicated low   Decision Making/ Complexity Score: low       Plan   Patient to be discharged today as per patient request.    Rebecca Garza, PT

## 2019-09-10 NOTE — TELEPHONE ENCOUNTER
Spoke to pt. Pt verbalized understanding. He said he has a good supply left of his thyroid medication and does not need a refill. He mentioned the pharmacy he uses is mail order and when he needs a refill they send in a request to be refilled.

## 2019-09-10 NOTE — TELEPHONE ENCOUNTER
Please let pt know that his cholesterol is a little lower and his good cholesterol is higher which is probably from the exercise and weight loss. Both going in the good direction. Other labs are stable and we can continue the same thyroid dose.     I would like to refill the thyroid medication. Which pharmacy would he like me to use. He told me he moved.

## 2019-10-25 ENCOUNTER — PATIENT OUTREACH (OUTPATIENT)
Dept: ADMINISTRATIVE | Facility: OTHER | Age: 49
End: 2019-10-25

## 2019-10-29 ENCOUNTER — OFFICE VISIT (OUTPATIENT)
Dept: RHEUMATOLOGY | Facility: CLINIC | Age: 49
End: 2019-10-29
Payer: COMMERCIAL

## 2019-10-29 ENCOUNTER — HOSPITAL ENCOUNTER (OUTPATIENT)
Dept: RADIOLOGY | Facility: HOSPITAL | Age: 49
Discharge: HOME | End: 2019-10-29
Attending: INTERNAL MEDICINE
Payer: COMMERCIAL

## 2019-10-29 VITALS
HEART RATE: 55 BPM | DIASTOLIC BLOOD PRESSURE: 73 MMHG | HEIGHT: 68 IN | SYSTOLIC BLOOD PRESSURE: 120 MMHG | BODY MASS INDEX: 25.42 KG/M2 | WEIGHT: 167.75 LBS

## 2019-10-29 DIAGNOSIS — M54.6 CHRONIC MIDLINE THORACIC BACK PAIN: ICD-10-CM

## 2019-10-29 DIAGNOSIS — M15.9 PRIMARY OSTEOARTHRITIS INVOLVING MULTIPLE JOINTS: Primary | ICD-10-CM

## 2019-10-29 DIAGNOSIS — G89.29 CHRONIC MIDLINE THORACIC BACK PAIN: ICD-10-CM

## 2019-10-29 PROBLEM — M15.0 PRIMARY OSTEOARTHRITIS INVOLVING MULTIPLE JOINTS: Status: ACTIVE | Noted: 2019-10-29

## 2019-10-29 PROCEDURE — 99214 OFFICE O/P EST MOD 30 MIN: CPT | Mod: S$GLB,,, | Performed by: INTERNAL MEDICINE

## 2019-10-29 PROCEDURE — 3008F PR BODY MASS INDEX (BMI) DOCUMENTED: ICD-10-PCS | Mod: CPTII,S$GLB,, | Performed by: INTERNAL MEDICINE

## 2019-10-29 PROCEDURE — 99214 PR OFFICE/OUTPT VISIT, EST, LEVL IV, 30-39 MIN: ICD-10-PCS | Mod: S$GLB,,, | Performed by: INTERNAL MEDICINE

## 2019-10-29 PROCEDURE — 3008F BODY MASS INDEX DOCD: CPT | Mod: CPTII,S$GLB,, | Performed by: INTERNAL MEDICINE

## 2019-10-29 PROCEDURE — 72082 XR SCOLIOSIS COMPLETE: ICD-10-PCS | Mod: 26,,, | Performed by: RADIOLOGY

## 2019-10-29 PROCEDURE — 72082 X-RAY EXAM ENTIRE SPI 2/3 VW: CPT | Mod: TC

## 2019-10-29 PROCEDURE — 72082 X-RAY EXAM ENTIRE SPI 2/3 VW: CPT | Mod: 26,,, | Performed by: RADIOLOGY

## 2019-10-29 PROCEDURE — 99999 PR PBB SHADOW E&M-EST. PATIENT-LVL III: ICD-10-PCS | Mod: PBBFAC,,, | Performed by: INTERNAL MEDICINE

## 2019-10-29 PROCEDURE — 99999 PR PBB SHADOW E&M-EST. PATIENT-LVL III: CPT | Mod: PBBFAC,,, | Performed by: INTERNAL MEDICINE

## 2019-10-29 ASSESSMENT — ROUTINE ASSESSMENT OF PATIENT INDEX DATA (RAPID3)
WHEN YOU AWAKENED IN THE MORNING OVER THE LAST WEEK, PLEASE INDICATE THE AMOUNT OF TIME IT TAKES UNTIL YOU ARE AS LIMBER AS YOU WILL BE FOR THE DAY: 10MIN
TOTAL RAPID3 SCORE: 2.89
MDHAQ FUNCTION SCORE: .8
FATIGUE SCORE: 4
AM STIFFNESS SCORE: 1, YES
PATIENT GLOBAL ASSESSMENT SCORE: 2
PAIN SCORE: 4
PSYCHOLOGICAL DISTRESS SCORE: 1.1

## 2019-10-29 NOTE — ASSESSMENT & PLAN NOTE
He has PE evidence of OA of thumbs and IP joints and c/o spine pain with possible scoliosis    Previous xr evidence of OA thumb MP joints    He has a healthy lifestyle but is very worried about his condition  I will get scoliosis XR of spine  I have recommended supplements  He can take NSAID prn  May benefit from Cymbalta at some point in the future  ACR OA handout   He can f/u with PCP and return to rheum prn

## 2019-10-29 NOTE — LETTER
October 29, 2019      Nori Palomo MD  [address]  [address]  [facility] [address]           [facility] - Rheumatology  [address]  [facility] [address]  Phone: [phone]  Fax: [phone]          Patient: Shraddha Vizcaino III   MR Number: [id number]   YOB: 1970   Date of Visit: 10/29/2019       Dear Dr. Nori Palomo:    Thank you for referring Shraddha Vizcaino to me for evaluation. Attached you will find relevant portions of my assessment and plan of care.    If you have questions, please do not hesitate to call me. I look forward to following Shraddha Vizcaino along with you.    Sincerely,    Patrick Murphy MD    Enclosure  CC:  No Recipients    If you would like to receive this communication electronically, please contact [email or web address] or [phone] to request more information on [employer] access.    For providers and/or their staff who would like to refer a patient to [facility], please contact us through our one-stop-shop provider referral line, [facility], at [phone].    If you feel you have received this communication in error or would no longer like to receive these types of communications, please e-mail [email or web address]

## 2019-10-29 NOTE — PROGRESS NOTES
"Subjective:       Patient ID: Medardo Vizcaino III is a 49 y.o. male.    Chief Complaint: Disease Management    HPI   NP to me  Referred by self and Dr Palomo  Previously saw Dr Geller and was not satisfied with her advise to take NSAID as needed    He has pain in hands and back  Started noticing more pain in last 3-4 years  Awakens with stiffness  Hips and knees feel stiff  Uses ergonomic chair at work  Exercises regularly ; walks [place] and [place] about 5 1/2 miles/d; 1-2 hour walk; able to do this without too much pain   Notes knuckles getting bigger    Goes  to chiropractor for back  Massage therapist once / month    2014 R shoulder labrum surgery ; Dr Lawrence  Cyst removed from R hand; Dr Silva    Takes rare OTC NSAID  Some OTC topicals  Mostly foam roller and other modalities    PMH:  Synthroid for hypothyroid  Hx meniere's for which uses HCTZ/triampterene    Renovating his own home    Review of Systems   Constitutional: Negative for fatigue, fever and unexpected weight change.   HENT: Negative for mouth sores.    Respiratory: Negative for cough and shortness of breath.    Cardiovascular: Negative for chest pain.   Gastrointestinal: Negative for diarrhea.   Genitourinary: Negative for dysuria.   Skin: Negative for rash.   Neurological: Negative for headaches.   Psychiatric/Behavioral: Negative for sleep disturbance.         Objective:   /73   Pulse (!) 55   Ht 5' 8" (1.727 m)   Wt 76.1 kg (167 lb 12.3 oz)   BMI 25.51 kg/m²      Physical Exam   Psychiatric:   Intense regarding preventing progression of osteoarthritis   Musculoskeletal:   Mild early R hand DIP joint enlargement  More established hypertrophy of 1st MP bilaterally  No other joint enlargement or tenderness and very good ROM hips and shoulders         XRAYs reviewed; minimal OA changes most evident at 1st MCP    Assessment:       1. Primary osteoarthritis involving multiple joints    2. Chronic midline thoracic back pain  "           Plan:       Problem List Items Addressed This Visit        Active Problems    Primary osteoarthritis involving multiple joints - Primary     He has PE evidence of OA of thumbs and IP joints and c/o spine pain with possible scoliosis    Previous xr evidence of OA thumb MP joints    He has a healthy lifestyle but is very worried about his condition  I will get scoliosis XR of spine  I have recommended supplements  He can take NSAID prn  May benefit from Cymbalta at some point in the future  ACR OA handout   He can f/u with PCP and return to rheum prn           Other Visit Diagnoses     Chronic midline thoracic back pain        Relevant Orders    X-Ray Scoliosis Complete (Completed)

## 2019-10-30 ENCOUNTER — TELEPHONE (OUTPATIENT)
Dept: RHEUMATOLOGY | Facility: CLINIC | Age: 49
End: 2019-10-30

## 2019-10-30 DIAGNOSIS — M41.25 OTHER IDIOPATHIC SCOLIOSIS, THORACOLUMBAR REGION: ICD-10-CM

## 2019-10-30 DIAGNOSIS — M15.9 PRIMARY OSTEOARTHRITIS INVOLVING MULTIPLE JOINTS: Primary | ICD-10-CM

## 2019-10-30 NOTE — TELEPHONE ENCOUNTER
I called him to report very mild scoliosis on xray and recommend PMR consult with Dr Lindquist or Leoncio if he has additional questions about PT or exercise therapy

## 2019-11-13 RX ORDER — LEVOTHYROXINE SODIUM 75 UG/1
TABLET ORAL
Qty: 90 TABLET | Refills: 3 | Status: SHIPPED | OUTPATIENT
Start: 2019-11-13 | End: 2020-10-14

## 2020-03-24 ENCOUNTER — DOCUMENTATION ONLY (OUTPATIENT)
Dept: REHABILITATION | Facility: HOSPITAL | Age: 50
End: 2020-03-24

## 2020-04-21 DIAGNOSIS — Z01.84 ANTIBODY RESPONSE EXAMINATION: ICD-10-CM

## 2020-05-21 DIAGNOSIS — Z01.84 ANTIBODY RESPONSE EXAMINATION: ICD-10-CM

## 2020-06-20 DIAGNOSIS — Z01.84 ANTIBODY RESPONSE EXAMINATION: ICD-10-CM

## 2020-07-20 DIAGNOSIS — Z01.84 ANTIBODY RESPONSE EXAMINATION: ICD-10-CM

## 2020-08-06 ENCOUNTER — TELEPHONE (OUTPATIENT)
Dept: SPORTS MEDICINE | Facility: CLINIC | Age: 50
End: 2020-08-06

## 2020-08-06 NOTE — TELEPHONE ENCOUNTER
----- Message from Jaciel Evans sent at 8/6/2020 11:48 AM CDT -----  Contact: pt  Please call pt at [phone]    Patient is requesting a podiatry referral directly from Dr Lawrence    Thank you

## 2020-08-06 NOTE — TELEPHONE ENCOUNTER
Returning call to patient.  He is experiencing some numbness in his feet.  He was able to call podiatry department and got an appointment.

## 2020-08-07 ENCOUNTER — OFFICE VISIT (OUTPATIENT)
Dept: PODIATRY | Facility: CLINIC | Age: 50
End: 2020-08-07
Payer: COMMERCIAL

## 2020-08-07 ENCOUNTER — PATIENT OUTREACH (OUTPATIENT)
Dept: ADMINISTRATIVE | Facility: OTHER | Age: 50
End: 2020-08-07

## 2020-08-07 VITALS
HEART RATE: 55 BPM | BODY MASS INDEX: 25.42 KG/M2 | DIASTOLIC BLOOD PRESSURE: 83 MMHG | SYSTOLIC BLOOD PRESSURE: 118 MMHG | WEIGHT: 167.75 LBS | HEIGHT: 68 IN

## 2020-08-07 DIAGNOSIS — G60.9 IDIOPATHIC PERIPHERAL NEUROPATHY: Primary | ICD-10-CM

## 2020-08-07 PROCEDURE — 99203 PR OFFICE/OUTPT VISIT, NEW, LEVL III, 30-44 MIN: ICD-10-PCS | Mod: S$GLB,,, | Performed by: PODIATRIST

## 2020-08-07 PROCEDURE — 3008F BODY MASS INDEX DOCD: CPT | Mod: CPTII,S$GLB,, | Performed by: PODIATRIST

## 2020-08-07 PROCEDURE — 99203 OFFICE O/P NEW LOW 30 MIN: CPT | Mod: S$GLB,,, | Performed by: PODIATRIST

## 2020-08-07 PROCEDURE — 3008F PR BODY MASS INDEX (BMI) DOCUMENTED: ICD-10-PCS | Mod: CPTII,S$GLB,, | Performed by: PODIATRIST

## 2020-08-07 PROCEDURE — 99999 PR PBB SHADOW E&M-EST. PATIENT-LVL III: CPT | Mod: PBBFAC,,, | Performed by: PODIATRIST

## 2020-08-07 PROCEDURE — 99999 PR PBB SHADOW E&M-EST. PATIENT-LVL III: ICD-10-PCS | Mod: PBBFAC,,, | Performed by: PODIATRIST

## 2020-08-07 NOTE — PROGRESS NOTES
Subjective:      Patient ID: Medardo Vizcaino III is a 49 y.o. male.    Chief Complaint:   Numbness (left foot w tingling )    Medardo is a 49 y.o. male who presents to the podiatry clinic  with complaint of  left foot numbness. Onset of the symptoms was a week ago/2 weeks. Precipitating event: none known. Current symptoms include: no limitations. just annoying. Aggravating factors: nothing in particular. always there. Symptoms have progressed to a point and plateaued. Patient has had no prior foot problems. Evaluation to date: none. Treatment to date: none. Patients rates pain 0/10 on pain scale.    Pt relates a lot of shoulder issues.. no foot issues in the past. He does have a very bad back... he relates he sees chiopractor every 2 weeks for adjustments. He has not seen ortho. He was told from an xray one year ago from the chiopractor that his spine is curved really bad    Pt relates he lost a lot of weight and hikes for about 5 miles a day at night. He had no injury.    He relates the big toe is numb and tingling... toward the top of the foot. No problems on the right.      No redness or other issues.      Past Medical History:   Diagnosis Date    Acromioclavicular joint arthritis 3/20/2014    Hypothyroidism      Past Surgical History:   Procedure Laterality Date    NASAL SEPTUM SURGERY      SHOULDER ARTHROSCOPY      SHOULDER SURGERY      TONSILLECTOMY      TUMOR EXCISION      arm; lipoma     Current Outpatient Medications on File Prior to Visit   Medication Sig Dispense Refill    diclofenac sodium (PENNSAID) 2 % SoPk Apply 40 mg topically 2 (two) times daily. (Patient not taking: Reported on 8/7/2020) 112 g 1    levothyroxine (SYNTHROID) 75 MCG tablet TAKE 1 TABLET BY MOUTH ONCE DAILY 90 tablet 3    triamterene-hydrochlorothiazide 75-50 mg (MAXZIDE) 75-50 mg per tablet Take 1 tablet by mouth once daily. 90 tablet 1     No current facility-administered medications on file prior to visit.      Review  "of patient's allergies indicates:   Allergen Reactions    Sulfa (sulfonamide antibiotics) Hives       Review of Systems   Constitution: Negative for chills, decreased appetite, fever, malaise/fatigue, night sweats, weight gain and weight loss.   Cardiovascular: Negative for chest pain, claudication, dyspnea on exertion, leg swelling, palpitations and syncope.   Respiratory: Negative for cough and shortness of breath.    Endocrine: Negative for cold intolerance and heat intolerance.   Hematologic/Lymphatic: Negative for bleeding problem. Does not bruise/bleed easily.   Skin: Negative for color change, dry skin, flushing, itching, nail changes, poor wound healing, rash, skin cancer, suspicious lesions and unusual hair distribution.   Musculoskeletal: Positive for back pain and joint pain (shoulder). Negative for arthritis, falls, gout, joint swelling, muscle cramps, muscle weakness, myalgias, neck pain and stiffness.   Gastrointestinal: Negative for diarrhea, nausea and vomiting.   Neurological: Positive for numbness and paresthesias. Negative for dizziness, focal weakness, light-headedness, tremors, vertigo and weakness.   Psychiatric/Behavioral: Negative for altered mental status and depression. The patient does not have insomnia.    Allergic/Immunologic: Negative.            Objective:       Vitals:    08/07/20 0812   BP: 118/83   Pulse: (!) 55   Weight: 76.1 kg (167 lb 12.3 oz)   Height: 5' 8" (1.727 m)   PainSc: 0-No pain   76.1 kg (167 lb 12.3 oz)     Physical Exam  Vitals signs and nursing note reviewed.   Constitutional:       General: He is not in acute distress.     Appearance: He is well-developed. He is not diaphoretic.   Cardiovascular:      Pulses:           Dorsalis pedis pulses are 2+ on the right side and 2+ on the left side.        Posterior tibial pulses are 2+ on the right side and 2+ on the left side.   Musculoskeletal: Normal range of motion.         General: No tenderness or deformity.      " Right foot: Normal range of motion. No deformity.      Left foot: Normal range of motion. No deformity.   Feet:      Right foot:      Protective Sensation: 10 sites tested. 10 sites sensed.      Skin integrity: No ulcer, blister, skin breakdown, erythema, warmth, callus or dry skin.      Left foot:      Protective Sensation: 10 sites tested. 9 sites sensed.      Skin integrity: No ulcer, blister, skin breakdown, erythema, warmth, callus or dry skin.      Comments: SWM and vibratory sensation diminished to left hallux and 1st MTPJ.    All other areas intact  Skin:     General: Skin is warm.      Capillary Refill: Capillary refill takes 2 to 3 seconds.      Coloration: Skin is not pale.      Findings: No erythema or rash.      Nails: There is no clubbing.     Neurological:      Mental Status: He is alert and oriented to person, place, and time.      Sensory: No sensory deficit.      Deep Tendon Reflexes: Reflexes are normal and symmetric.   Psychiatric:         Behavior: Behavior normal.         Thought Content: Thought content normal.         Judgment: Judgment normal.               Assessment:       Encounter Diagnosis   Name Primary?    Idiopathic peripheral neuropathy Yes         Plan:       Medardo was seen today for numbness.    Diagnoses and all orders for this visit:    Idiopathic peripheral neuropathy  -     Ambulatory referral/consult to Orthopedics; Future      I counseled the patient on his conditions, their implications and medical management.      D/w pt neuropathy dx. Recommend othopedic eval for back since he has known back issues;    If fails to improve symptoms recommend NCV testing/Neuro consult    Continue proper shoegear    prn  .

## 2020-08-19 DIAGNOSIS — Z01.84 ANTIBODY RESPONSE EXAMINATION: ICD-10-CM

## 2020-09-08 ENCOUNTER — PATIENT OUTREACH (OUTPATIENT)
Dept: ADMINISTRATIVE | Facility: OTHER | Age: 50
End: 2020-09-08

## 2020-09-08 ENCOUNTER — HOSPITAL ENCOUNTER (OUTPATIENT)
Dept: RADIOLOGY | Facility: HOSPITAL | Age: 50
Discharge: HOME | End: 2020-09-08
Attending: ORTHOPAEDIC SURGERY
Payer: COMMERCIAL

## 2020-09-08 ENCOUNTER — OFFICE VISIT (OUTPATIENT)
Dept: SPORTS MEDICINE | Facility: CLINIC | Age: 50
End: 2020-09-08
Payer: COMMERCIAL

## 2020-09-08 VITALS
WEIGHT: 166.13 LBS | HEART RATE: 55 BPM | DIASTOLIC BLOOD PRESSURE: 69 MMHG | SYSTOLIC BLOOD PRESSURE: 120 MMHG | HEIGHT: 68 IN | BODY MASS INDEX: 25.18 KG/M2

## 2020-09-08 DIAGNOSIS — M25.512 LEFT SHOULDER PAIN, UNSPECIFIED CHRONICITY: ICD-10-CM

## 2020-09-08 DIAGNOSIS — M25.512 LEFT SHOULDER PAIN, UNSPECIFIED CHRONICITY: Primary | ICD-10-CM

## 2020-09-08 PROCEDURE — 99999 PR PBB SHADOW E&M-EST. PATIENT-LVL IV: CPT | Mod: PBBFAC,,, | Performed by: ORTHOPAEDIC SURGERY

## 2020-09-08 PROCEDURE — 73030 X-RAY EXAM OF SHOULDER: CPT | Mod: TC,LT

## 2020-09-08 PROCEDURE — 3008F PR BODY MASS INDEX (BMI) DOCUMENTED: ICD-10-PCS | Mod: CPTII,S$GLB,, | Performed by: ORTHOPAEDIC SURGERY

## 2020-09-08 PROCEDURE — 99213 OFFICE O/P EST LOW 20 MIN: CPT | Mod: S$GLB,,, | Performed by: ORTHOPAEDIC SURGERY

## 2020-09-08 PROCEDURE — 73030 XR SHOULDER COMPLETE 2 OR MORE VIEWS LEFT: ICD-10-PCS | Mod: 26,LT,, | Performed by: RADIOLOGY

## 2020-09-08 PROCEDURE — 99213 PR OFFICE/OUTPT VISIT, EST, LEVL III, 20-29 MIN: ICD-10-PCS | Mod: S$GLB,,, | Performed by: ORTHOPAEDIC SURGERY

## 2020-09-08 PROCEDURE — 3008F BODY MASS INDEX DOCD: CPT | Mod: CPTII,S$GLB,, | Performed by: ORTHOPAEDIC SURGERY

## 2020-09-08 PROCEDURE — 99999 PR PBB SHADOW E&M-EST. PATIENT-LVL IV: ICD-10-PCS | Mod: PBBFAC,,, | Performed by: ORTHOPAEDIC SURGERY

## 2020-09-08 PROCEDURE — 73030 X-RAY EXAM OF SHOULDER: CPT | Mod: 26,LT,, | Performed by: RADIOLOGY

## 2020-09-08 NOTE — PROGRESS NOTES
CC: LEFT shoulder pain     49 y.o. Male with a history of R RCR, SAD, DCE, biceps tenodesis who presents with L shoulder discomfort of one year duration.  No injury.  Has associated numbness and tingling down to ulnar fingers.  Worse when hunched.  Also has L foot numbness.  Has back pain.    He reports that the pain and weakness is worse with overhead activity. It also bothers him at night.    Is affecting ADLs.  Pain is mild      Past Medical History:   Diagnosis Date    Acromioclavicular joint arthritis 3/20/2014    Hypothyroidism        Past Surgical History:   Procedure Laterality Date    NASAL SEPTUM SURGERY      SHOULDER ARTHROSCOPY      SHOULDER SURGERY      TONSILLECTOMY      TUMOR EXCISION      arm; lipoma       Family History   Problem Relation Age of Onset    Hypertension Father     Pacemaker/defibrilator Father         Pacemaker    Diabetes Maternal Grandmother     Diabetes Maternal Grandfather     Heart disease Paternal Grandfather     Arthritis Mother     Hypothyroidism Mother     Hypothyroidism Sister     No Known Problems Paternal Grandmother     No Known Problems Maternal Aunt     No Known Problems Maternal Uncle     No Known Problems Paternal Aunt     No Known Problems Paternal Uncle     Amblyopia Neg Hx     Blindness Neg Hx     Cancer Neg Hx     Cataracts Neg Hx     Glaucoma Neg Hx     Macular degeneration Neg Hx     Retinal detachment Neg Hx     Strabismus Neg Hx     Stroke Neg Hx     Thyroid disease Neg Hx     Psoriasis Neg Hx     Inflammatory bowel disease Neg Hx     Lupus Neg Hx          Current Outpatient Medications:     levothyroxine (SYNTHROID) 75 MCG tablet, TAKE 1 TABLET BY MOUTH ONCE DAILY, Disp: 90 tablet, Rfl: 3    diclofenac sodium (PENNSAID) 2 % SoPk, Apply 40 mg topically 2 (two) times daily. (Patient not taking: Reported on 8/7/2020), Disp: 112 g, Rfl: 1    triamterene-hydrochlorothiazide 75-50 mg (MAXZIDE) 75-50 mg per tablet, Take 1 tablet by  "mouth once daily., Disp: 90 tablet, Rfl: 1    Review of patient's allergies indicates:   Allergen Reactions    Sulfa (sulfonamide antibiotics) Hives          REVIEW OF SYSTEMS:  Constitution: Negative. Negative for chills, fever and night sweats.   HENT: Negative for congestion and headaches.    Eyes: Negative for blurred vision, left vision loss and right vision loss.   Cardiovascular: Negative for chest pain and syncope.   Respiratory: Negative for cough and shortness of breath.    Endocrine: Negative for polydipsia, polyphagia and polyuria.   Hematologic/Lymphatic: Negative for bleeding problem. Does not bruise/bleed easily.   Skin: Negative for dry skin, itching and rash.   Musculoskeletal: Negative for falls.  Positive for left shoulder pain and muscle weakness.   Gastrointestinal: Negative for abdominal pain and bowel incontinence.   Genitourinary: Negative for bladder incontinence and nocturia.   Neurological: Negative for disturbances in coordination, loss of balance and seizures.   Psychiatric/Behavioral: Negative for depression. The patient does not have insomnia.    Allergic/Immunologic: Negative for hives and persistent infections.      PHYSICAL EXAMINATION:  Vitals:  /69   Pulse (!) 55   Ht 5' 8" (1.727 m)   Wt 75.3 kg (166 lb 1.6 oz)   BMI 25.26 kg/m²    General: The patient is alert and oriented x 3.  Mood is pleasant.  Observation of ears, eyes and nose reveal no gross abnormalities.  No labored breathing observed.  Gait is coordinated. Patient can toe walk and heel walk without difficulty.      LEFT Shoulder / Upper Extremity Exam    OBSERVATION:     Swelling  none  Deformity  none   Discoloration  none   Scapular winging none   Scars   none  Atrophy  none    TENDERNESS / CREPITUS (T/C):          T/C      T/C   Clavicle   -/-  SUPRAspinatus    -/-     AC Jt.    -/-  INFRAspinatus  -/-    SC Jt.    -/-  Deltoid    -/-      G. Tuberosity  -/-  LH BICEP groove  -/-   Acromion:  -/-  Midline " Neck   -/-     Scapular Spine -/-  Trapezium   -/-   SMA Scapula  -/-  GH jt. line - post  -/-     Scapulothoracic  -/-         ROM: (* = with pain)  Right shoulder   Left shoulder    Abduction   180    180   Flexion    180    180   ER    70    70   IR    T10    T10    STRENGTH: (* = with pain) Right shoulder   Left shoulder    SCAPTION   5/5    5/5    IR    5/5    5/5   ER    5/5    5/5   BICEPS   5/5    5/5   Deltoid    5/5    5/5     SIGNS:  Painful side       NEER   -    ORASHMIS  neg    CHAVEZ   -    SPEEDS  neg     DROP ARM   -   BELLY PRESS neg   Superior escape none    LIFT-OFF  neg   X-Body ADD    neg    MOVING VALGUS neg        STABILITY TESTING    Right shoulder   Left shoulder    Translation     Anterior  up face     up face    Posterior  up face    up face    Sulcus   < 10mm    < 10 mm     Signs   Apprehension   neg      neg       Relocation   no change     no change      Jerk test  neg     neg    EXTREMITY NEURO-VASCULAR EXAM:    Sensation grossly intact to light touch all dermatomal regions.    DTR 2+ Biceps, Triceps, BR and Negative Nimcos sign   Grossly intact motor function at Elbow, Wrist and Hand   Distal pulses radial and ulnar 2+, brisk cap refill, symmetric.      NECK:  Painless FROM and spinous processes non-tender. Negative Spurlings sign.      OTHER FINDINGS:  mild scapular dyskinesia    XRAYS:  Xrays including AP, Outlet and Axillary Lateral of Left shoulder are ordered / images reviewed by me:   No fracture dislocation or other pathology   Acromion type 2   Proximal migration of humeral head: None   GH arthritis: None          ASSESSMENT:   Back pain with left upper and lower extremity n/t    PLAN:      1. Healthy back PT  2. Refer to spine  3. F/u prn    All questions were answered, patient will contact us for questions or concerns in the interim.

## 2020-09-10 ENCOUNTER — TELEPHONE (OUTPATIENT)
Dept: SPORTS MEDICINE | Facility: CLINIC | Age: 50
End: 2020-09-10

## 2020-09-10 NOTE — TELEPHONE ENCOUNTER
----- Message from Jaciel Evans sent at 9/10/2020 10:50 AM CDT -----  Contact: pt  Please call pt at [phone] or [phone]     Patient has some aftercare questions regarding his last office visit     Thank you

## 2020-09-10 NOTE — TELEPHONE ENCOUNTER
Returning call to patient.  He would also like an appointment scheduled with Dr. Munoz.  Interested in establishing long term care with Dr. Munoz.  Would like to keep his appointment with Magalie Moody PA-C.

## 2020-09-14 ENCOUNTER — OFFICE VISIT (OUTPATIENT)
Dept: INTERNAL MEDICINE | Facility: CLINIC | Age: 50
End: 2020-09-14
Payer: COMMERCIAL

## 2020-09-14 ENCOUNTER — LAB VISIT (OUTPATIENT)
Dept: LAB | Facility: HOSPITAL | Age: 50
End: 2020-09-14
Attending: INTERNAL MEDICINE
Payer: COMMERCIAL

## 2020-09-14 VITALS
HEART RATE: 60 BPM | SYSTOLIC BLOOD PRESSURE: 106 MMHG | BODY MASS INDEX: 25.38 KG/M2 | DIASTOLIC BLOOD PRESSURE: 76 MMHG | OXYGEN SATURATION: 98 % | WEIGHT: 166.88 LBS

## 2020-09-14 DIAGNOSIS — Z00.00 ROUTINE PHYSICAL EXAMINATION: Primary | ICD-10-CM

## 2020-09-14 DIAGNOSIS — G62.9 NEUROPATHY: ICD-10-CM

## 2020-09-14 DIAGNOSIS — Z00.00 ROUTINE PHYSICAL EXAMINATION: ICD-10-CM

## 2020-09-14 DIAGNOSIS — Z12.5 SCREENING FOR PROSTATE CANCER: ICD-10-CM

## 2020-09-14 DIAGNOSIS — Z11.59 NEED FOR HEPATITIS C SCREENING TEST: ICD-10-CM

## 2020-09-14 DIAGNOSIS — E03.4 HYPOTHYROIDISM DUE TO ACQUIRED ATROPHY OF THYROID: ICD-10-CM

## 2020-09-14 DIAGNOSIS — M41.25 OTHER IDIOPATHIC SCOLIOSIS, THORACOLUMBAR REGION: ICD-10-CM

## 2020-09-14 LAB
ALBUMIN SERPL BCP-MCNC: 4.1 G/DL (ref 3.5–5.2)
ALP SERPL-CCNC: 69 U/L (ref 55–135)
ALT SERPL W/O P-5'-P-CCNC: 15 U/L (ref 10–44)
ANION GAP SERPL CALC-SCNC: 7 MMOL/L (ref 8–16)
AST SERPL-CCNC: 17 U/L (ref 10–40)
BASOPHILS # BLD AUTO: 0.03 K/UL (ref 0–0.2)
BASOPHILS NFR BLD: 0.6 % (ref 0–1.9)
BILIRUB SERPL-MCNC: 0.6 MG/DL (ref 0.1–1)
BUN SERPL-MCNC: 7 MG/DL (ref 6–20)
CALCIUM SERPL-MCNC: 9 MG/DL (ref 8.7–10.5)
CHLORIDE SERPL-SCNC: 106 MMOL/L (ref 95–110)
CHOLEST SERPL-MCNC: 180 MG/DL (ref 120–199)
CHOLEST/HDLC SERPL: 3.1 {RATIO} (ref 2–5)
CO2 SERPL-SCNC: 29 MMOL/L (ref 23–29)
COMPLEXED PSA SERPL-MCNC: 0.31 NG/ML (ref 0–4)
CREAT SERPL-MCNC: 0.9 MG/DL (ref 0.5–1.4)
DIFFERENTIAL METHOD: ABNORMAL
EOSINOPHIL # BLD AUTO: 0.1 K/UL (ref 0–0.5)
EOSINOPHIL NFR BLD: 1.3 % (ref 0–8)
ERYTHROCYTE [DISTWIDTH] IN BLOOD BY AUTOMATED COUNT: 13 % (ref 11.5–14.5)
EST. GFR  (AFRICAN AMERICAN): >60 ML/MIN/1.73 M^2
EST. GFR  (NON AFRICAN AMERICAN): >60 ML/MIN/1.73 M^2
FOLATE SERPL-MCNC: 10.3 NG/ML (ref 4–24)
GLUCOSE SERPL-MCNC: 100 MG/DL (ref 70–110)
HCT VFR BLD AUTO: 45.4 % (ref 40–54)
HDLC SERPL-MCNC: 58 MG/DL (ref 40–75)
HDLC SERPL: 32.2 % (ref 20–50)
HGB BLD-MCNC: 14.8 G/DL (ref 14–18)
IMM GRANULOCYTES # BLD AUTO: 0.01 K/UL (ref 0–0.04)
IMM GRANULOCYTES NFR BLD AUTO: 0.2 % (ref 0–0.5)
LDLC SERPL CALC-MCNC: 111 MG/DL (ref 63–159)
LYMPHOCYTES # BLD AUTO: 1.3 K/UL (ref 1–4.8)
LYMPHOCYTES NFR BLD: 23.2 % (ref 18–48)
MCH RBC QN AUTO: 31.2 PG (ref 27–31)
MCHC RBC AUTO-ENTMCNC: 32.6 G/DL (ref 32–36)
MCV RBC AUTO: 96 FL (ref 82–98)
MONOCYTES # BLD AUTO: 0.3 K/UL (ref 0.3–1)
MONOCYTES NFR BLD: 5.8 % (ref 4–15)
NEUTROPHILS # BLD AUTO: 3.7 K/UL (ref 1.8–7.7)
NEUTROPHILS NFR BLD: 68.9 % (ref 38–73)
NONHDLC SERPL-MCNC: 122 MG/DL
NRBC BLD-RTO: 0 /100 WBC
PLATELET # BLD AUTO: 221 K/UL (ref 150–350)
PMV BLD AUTO: 10.1 FL (ref 9.2–12.9)
POTASSIUM SERPL-SCNC: 4.1 MMOL/L (ref 3.5–5.1)
PROT SERPL-MCNC: 6.7 G/DL (ref 6–8.4)
RBC # BLD AUTO: 4.74 M/UL (ref 4.6–6.2)
SODIUM SERPL-SCNC: 142 MMOL/L (ref 136–145)
TRIGL SERPL-MCNC: 55 MG/DL (ref 30–150)
TSH SERPL DL<=0.005 MIU/L-ACNC: 1.38 UIU/ML (ref 0.4–4)
VIT B12 SERPL-MCNC: 291 PG/ML (ref 210–950)
WBC # BLD AUTO: 5.38 K/UL (ref 3.9–12.7)

## 2020-09-14 PROCEDURE — 80061 LIPID PANEL: CPT

## 2020-09-14 PROCEDURE — 99999 PR PBB SHADOW E&M-EST. PATIENT-LVL III: CPT | Mod: PBBFAC,,, | Performed by: INTERNAL MEDICINE

## 2020-09-14 PROCEDURE — 99999 PR PBB SHADOW E&M-EST. PATIENT-LVL III: ICD-10-PCS | Mod: PBBFAC,,, | Performed by: INTERNAL MEDICINE

## 2020-09-14 PROCEDURE — 82607 VITAMIN B-12: CPT

## 2020-09-14 PROCEDURE — 86803 HEPATITIS C AB TEST: CPT

## 2020-09-14 PROCEDURE — 99396 PREV VISIT EST AGE 40-64: CPT | Mod: S$GLB,,, | Performed by: INTERNAL MEDICINE

## 2020-09-14 PROCEDURE — 84443 ASSAY THYROID STIM HORMONE: CPT

## 2020-09-14 PROCEDURE — 85025 COMPLETE CBC W/AUTO DIFF WBC: CPT

## 2020-09-14 PROCEDURE — 80053 COMPREHEN METABOLIC PANEL: CPT

## 2020-09-14 PROCEDURE — 84153 ASSAY OF PSA TOTAL: CPT

## 2020-09-14 PROCEDURE — 36415 COLL VENOUS BLD VENIPUNCTURE: CPT

## 2020-09-14 PROCEDURE — 3008F PR BODY MASS INDEX (BMI) DOCUMENTED: ICD-10-PCS | Mod: CPTII,S$GLB,, | Performed by: INTERNAL MEDICINE

## 2020-09-14 PROCEDURE — 3008F BODY MASS INDEX DOCD: CPT | Mod: CPTII,S$GLB,, | Performed by: INTERNAL MEDICINE

## 2020-09-14 PROCEDURE — 82746 ASSAY OF FOLIC ACID SERUM: CPT

## 2020-09-14 PROCEDURE — 99396 PR PREVENTIVE VISIT,EST,40-64: ICD-10-PCS | Mod: S$GLB,,, | Performed by: INTERNAL MEDICINE

## 2020-09-14 NOTE — PROGRESS NOTES
Subjective:       Patient ID: Medardo Vizcaino III is a 49 y.o. male.    Chief Complaint: Annual Exam    HPI:  Patient here for annual exam.  He has been having some problems with his left shoulder and left leg with some numbness and tingling.  He is going to see 1 of the back and spine providers.  He has seen rheumatology Orthopedics am Podiatry  He is due for updated labs and I will add a PSA and some vitamins for the numbness and tingling.  He denies any chest pain shortness of breath fevers or chills.  He exercises 6 or 7 days week hiking and biking for several hours a day.  He has lost some weight by increasing exercise.  He is concerned because his family especially his dad in great grandmother have a history of chronic worsening back conditions and he is trying to avoid that.    Review of Systems   Constitutional: Negative for chills, fatigue, fever and unexpected weight change.   HENT: Negative for nosebleeds and trouble swallowing.    Eyes: Negative for pain and visual disturbance.   Respiratory: Negative for cough, shortness of breath and wheezing.    Cardiovascular: Negative for chest pain and palpitations.   Gastrointestinal: Negative for abdominal pain, constipation, diarrhea, nausea and vomiting.   Genitourinary: Negative for difficulty urinating and hematuria.   Musculoskeletal: Positive for arthralgias and back pain. Negative for neck pain.   Integumentary:  Negative for rash.   Neurological: Negative for dizziness and headaches.   Hematological: Does not bruise/bleed easily.   Psychiatric/Behavioral: Negative for dysphoric mood, sleep disturbance and suicidal ideas.         Objective:      Physical Exam  Constitutional:       General: He is not in acute distress.     Appearance: He is well-developed.   HENT:      Head: Normocephalic and atraumatic.      Right Ear: Tympanic membrane, ear canal and external ear normal. There is no impacted cerumen.      Left Ear: Tympanic membrane, ear canal and  external ear normal. There is no impacted cerumen.      Mouth/Throat:      Pharynx: No oropharyngeal exudate or posterior oropharyngeal erythema.   Eyes:      General: No scleral icterus.     Conjunctiva/sclera: Conjunctivae normal.      Pupils: Pupils are equal, round, and reactive to light.   Neck:      Musculoskeletal: Normal range of motion and neck supple.      Thyroid: No thyromegaly.      Comments: No supraclavicular nodes palpated  Cardiovascular:      Rate and Rhythm: Normal rate and regular rhythm.      Heart sounds: Normal heart sounds. No murmur.   Pulmonary:      Effort: Pulmonary effort is normal.      Breath sounds: Normal breath sounds. No wheezing.   Abdominal:      General: Bowel sounds are normal.      Palpations: Abdomen is soft. There is no mass.      Tenderness: There is no abdominal tenderness.   Musculoskeletal:         General: Tenderness (Left shoulder posteriorly) present.   Lymphadenopathy:      Cervical: No cervical adenopathy.   Skin:     Coloration: Skin is not pale.      Findings: No erythema or rash.   Neurological:      Mental Status: He is alert and oriented to person, place, and time.   Psychiatric:         Mood and Affect: Mood normal.         Behavior: Behavior normal.         Assessment:       1. Routine physical examination    2. Screening for prostate cancer    3. Hypothyroidism due to acquired atrophy of thyroid    4. Other idiopathic scoliosis, thoracolumbar region    5. Neuropathy    6. Need for hepatitis C screening test        Plan:       Medardo was seen today for annual exam.    Diagnoses and all orders for this visit:    Routine physical examination  -     Lipid Panel; Future  -     CBC auto differential; Future  -     Comprehensive metabolic panel; Future  -     PSA, Screening; Future  -     TSH; Future    Screening for prostate cancer  -     PSA, Screening; Future    Hypothyroidism due to acquired atrophy of thyroid  -     Lipid Panel; Future  -     CBC auto  differential; Future  -     Comprehensive metabolic panel; Future  -     TSH; Future    Other idiopathic scoliosis, thoracolumbar region    Neuropathy  -     TSH; Future  -     Folate; Future  -     Vitamin B12; Future    Need for hepatitis C screening test  -     Hepatitis C Antibody; Future

## 2020-09-15 ENCOUNTER — TELEPHONE (OUTPATIENT)
Dept: INTERNAL MEDICINE | Facility: CLINIC | Age: 50
End: 2020-09-15

## 2020-09-15 LAB — HCV AB SERPL QL IA: NEGATIVE

## 2020-09-15 NOTE — TELEPHONE ENCOUNTER
Let pt know that his cholesterol is much lower. Great job lowering weight and exercising.     His prostate blood test is very low (that is very good news).    His B12 level is at the low normal range. I am not certain if this would contribute to any of his numbness or tingling, but suggest he supplement for a few weeks with 1000 units of B12 daily and see if that make a difference. Let me know of any questions.

## 2020-09-15 NOTE — TELEPHONE ENCOUNTER
Spoke to pt. Explained results. Het verbalized understanding.  Labs are mailed out as pt requested.

## 2020-09-18 DIAGNOSIS — Z01.84 ANTIBODY RESPONSE EXAMINATION: ICD-10-CM

## 2020-09-30 ENCOUNTER — TELEPHONE (OUTPATIENT)
Dept: ORTHOPEDICS | Facility: CLINIC | Age: 50
End: 2020-09-30

## 2020-09-30 DIAGNOSIS — M51.36 DDD (DEGENERATIVE DISC DISEASE), LUMBAR: Primary | ICD-10-CM

## 2020-10-09 ENCOUNTER — TELEPHONE (OUTPATIENT)
Dept: ORTHOPEDICS | Facility: CLINIC | Age: 50
End: 2020-10-09

## 2020-10-09 DIAGNOSIS — M50.30 DDD (DEGENERATIVE DISC DISEASE), CERVICAL: Primary | ICD-10-CM

## 2020-10-11 ENCOUNTER — PATIENT OUTREACH (OUTPATIENT)
Dept: ADMINISTRATIVE | Facility: OTHER | Age: 50
End: 2020-10-11

## 2020-10-12 ENCOUNTER — HOSPITAL ENCOUNTER (OUTPATIENT)
Dept: RADIOLOGY | Facility: HOSPITAL | Age: 50
Discharge: HOME | End: 2020-10-12
Attending: PHYSICIAN ASSISTANT
Payer: COMMERCIAL

## 2020-10-12 ENCOUNTER — OFFICE VISIT (OUTPATIENT)
Dept: ORTHOPEDICS | Facility: CLINIC | Age: 50
End: 2020-10-12
Payer: COMMERCIAL

## 2020-10-12 VITALS — WEIGHT: 166.88 LBS | BODY MASS INDEX: 25.29 KG/M2 | HEIGHT: 68 IN

## 2020-10-12 DIAGNOSIS — M50.30 DDD (DEGENERATIVE DISC DISEASE), CERVICAL: ICD-10-CM

## 2020-10-12 DIAGNOSIS — M51.36 DDD (DEGENERATIVE DISC DISEASE), LUMBAR: ICD-10-CM

## 2020-10-12 DIAGNOSIS — G60.9 IDIOPATHIC PERIPHERAL NEUROPATHY: ICD-10-CM

## 2020-10-12 DIAGNOSIS — M54.50 CHRONIC LEFT-SIDED LOW BACK PAIN WITHOUT SCIATICA: ICD-10-CM

## 2020-10-12 DIAGNOSIS — M54.2 NECK PAIN: Primary | ICD-10-CM

## 2020-10-12 DIAGNOSIS — G89.29 CHRONIC LEFT-SIDED LOW BACK PAIN WITHOUT SCIATICA: ICD-10-CM

## 2020-10-12 PROCEDURE — 72100 X-RAY EXAM L-S SPINE 2/3 VWS: CPT | Mod: 26,,, | Performed by: RADIOLOGY

## 2020-10-12 PROCEDURE — 72120 XR LUMBAR SPINE AP AND LAT WITH FLEX/EXT: ICD-10-PCS | Mod: 26,,, | Performed by: RADIOLOGY

## 2020-10-12 PROCEDURE — 99244 PR OFFICE CONSULTATION,LEVEL IV: ICD-10-PCS | Mod: S$GLB,,, | Performed by: PHYSICIAN ASSISTANT

## 2020-10-12 PROCEDURE — 72050 X-RAY EXAM NECK SPINE 4/5VWS: CPT | Mod: TC

## 2020-10-12 PROCEDURE — 99244 OFF/OP CNSLTJ NEW/EST MOD 40: CPT | Mod: S$GLB,,, | Performed by: PHYSICIAN ASSISTANT

## 2020-10-12 PROCEDURE — 72050 XR CERVICAL SPINE AP LAT WITH FLEX EXTEN: ICD-10-PCS | Mod: 26,,, | Performed by: RADIOLOGY

## 2020-10-12 PROCEDURE — 72050 X-RAY EXAM NECK SPINE 4/5VWS: CPT | Mod: 26,,, | Performed by: RADIOLOGY

## 2020-10-12 PROCEDURE — 72100 X-RAY EXAM L-S SPINE 2/3 VWS: CPT | Mod: TC

## 2020-10-12 PROCEDURE — 72100 XR LUMBAR SPINE AP AND LAT WITH FLEX/EXT: ICD-10-PCS | Mod: 26,,, | Performed by: RADIOLOGY

## 2020-10-12 PROCEDURE — 99999 PR PBB SHADOW E&M-EST. PATIENT-LVL III: CPT | Mod: PBBFAC,,, | Performed by: PHYSICIAN ASSISTANT

## 2020-10-12 PROCEDURE — 99999 PR PBB SHADOW E&M-EST. PATIENT-LVL III: ICD-10-PCS | Mod: PBBFAC,,, | Performed by: PHYSICIAN ASSISTANT

## 2020-10-12 PROCEDURE — 72120 X-RAY BEND ONLY L-S SPINE: CPT | Mod: 26,,, | Performed by: RADIOLOGY

## 2020-10-12 NOTE — LETTER
October 12, 2020      Mary Lowry, KALI  [address]  [address]  [facility] [address]           [id number]  [address]  [facility] [address]  Phone: [phone]          Patient: Shraddha Vizcaino III   MR Number: [id number]   YOB: 1970   Date of Visit: 10/12/2020       Dear Dr. Mary Lowry:    Thank you for referring Shraddha Vizcaino to me for evaluation. Attached you will find relevant portions of my assessment and plan of care.    If you have questions, please do not hesitate to call me. I look forward to following Shraddha Vizcaino along with you.    Sincerely,    Magalie Moody PA-C    Enclosure  CC:  No Recipients    If you would like to receive this communication electronically, please contact [email or web address] or [phone] to request more information on [facility] access.    For providers and/or their staff who would like to refer a patient to [facility], please contact us through our one-stop-shop provider referral line, [facility], at [phone].    If you feel you have received this communication in error or would no longer like to receive these types of communications, please e-mail [email or web address]

## 2020-10-12 NOTE — PROGRESS NOTES
Patient's chart was reviewed for overdue ODESSA topics.  Immunizations reconciled.    Orders placed:n/a  Tasked appts:n/a  Labs Linked:n/a

## 2020-10-12 NOTE — PROGRESS NOTES
"  DATE: 10/12/2020  PATIENT: Medardo Vizcaino III    Supervising Physician: Nestor Munoz M.D.    CHIEF COMPLAINT: neck and back pain    HISTORY:  Medardo Vizcaino III is a 49 y.o. 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 The pain has been present for many years.  He says he can remember pain since he was in his 20s and he reports his back "going out" at least 4 times since then.  He reports a family history of back issues and says he wants to stay ahead of it.  The patient describes the pain as aching.  The pain is worse with sitting in a bad chair and improved by a chiropractor and staying active.  He hikes and bikes on weekends and says this helps control his pain.  There is occasionally associated numbness and tingling in the left hand and left foot. There is occasionally subjective weakness in the left hand. Prior treatments have included ibuprofen, a chiropractor, massage and home exercises, but no PT, ESIs or surgery.      The patient denies myelopathic symptoms such as handwriting changes or difficulty with buttons/coins/keys. Denies perineal paresthesias, bowel/bladder dysfunction.    PAST MEDICAL/SURGICAL HISTORY:  Past Medical History:   Diagnosis Date    Acromioclavicular joint arthritis 3/20/2014    Hypothyroidism      Past Surgical History:   Procedure Laterality Date    NASAL SEPTUM SURGERY      SHOULDER ARTHROSCOPY      SHOULDER SURGERY      TONSILLECTOMY      TUMOR EXCISION      arm; lipoma       Medications:   Current Outpatient Medications on File Prior to Visit   Medication Sig Dispense Refill    diclofenac sodium (PENNSAID) 2 % SoPk Apply 40 mg topically 2 (two) times daily. (Patient not taking: Reported on 8/7/2020) 112 g 1    levothyroxine (SYNTHROID) 75 MCG tablet TAKE 1 TABLET BY MOUTH ONCE DAILY 90 tablet 3    triamterene-hydrochlorothiazide 75-50 mg (MAXZIDE) 75-50 mg per tablet Take 1 tablet by " mouth once daily. (Patient not taking: Reported on 9/14/2020) 90 tablet 1     No current facility-administered medications on file prior to visit.        Social History:   Social History     Socioeconomic History    Marital status: Unknown     Spouse name: Not on file    Number of children: Not on file    Years of education: Not on file    Highest education level: Not on file   Occupational History    Not on file   Social Needs    Financial resource strain: Not on file    Food insecurity     Worry: Not on file     Inability: Not on file    Transportation needs     Medical: Not on file     Non-medical: Not on file   Tobacco Use    Smoking status: Never Smoker    Smokeless tobacco: Never Used    Tobacco comment: data analysis; ; no children   Substance and Sexual Activity    Alcohol use: Yes     Comment: one daily    Drug use: Yes     Types: Marijuana     Comment: daily since age 35; helps emotions and joint pain    Sexual activity: Yes     Partners: Female     Birth control/protection: Condom   Lifestyle    Physical activity     Days per week: Not on file     Minutes per session: Not on file    Stress: Not on file   Relationships    Social connections     Talks on phone: Not on file     Gets together: Not on file     Attends Denominational service: Not on file     Active member of club or organization: Not on file     Attends meetings of clubs or organizations: Not on file     Relationship status: Not on file   Other Topics Concern    Not on file   Social History Narrative    Not on file       REVIEW OF SYSTEMS:  Constitution: Negative. Negative for chills, fever and night sweats.   Cardiovascular: Negative for chest pain and syncope.   Respiratory: Negative for cough and shortness of breath.   Gastrointestinal: See HPI. Negative for nausea/vomiting. Negative for abdominal pain.  Genitourinary: See HPI. Negative for discoloration or dysuria.  Skin: Negative for dry skin, itching and rash.  "  Hematologic/Lymphatic: Negative for bleeding problem. Does not bruise/bleed easily.   Musculoskeletal: Negative for falls and muscle weakness.   Neurological: See HPI. No seizures.   Endocrine: Negative for polydipsia, polyphagia and polyuria.   Allergic/Immunologic: Negative for hives and persistent infections.     EXAM:  Ht 5' 8" (1.727 m)   Wt 75.7 kg (166 lb 14.2 oz)   BMI 25.38 kg/m²     PHYSICAL EXAMINATION:    General: The patient is a very pleasant 49 y.o. male in no apparent distress, the patient is oriented to person, place and time.  Psych: Normal mood and affect  HEENT: Vision grossly intact, hearing intact to the spoken word.  Lungs: Respirations unlabored.  Gait: Normal station and gait, no difficulty with toe or heel walk.   Skin: Cervical skin and dorsal lumbar skin negative for rashes, lesions, hairy patches and surgical scars.    Range of motion: Cervical and lumbar range of motion is acceptable. There is minimal tenderness to palpation of the paracervical muscles.  There is minimal lumbar tenderness to palpation.  Spinal Balance: Global saggital and coronal spinal balance acceptable, no significant for scoliosis and kyphosis.  Musculoskeletal: No pain with the range of motion of the bilateral shoulders and elbows. Normal bulk and contour of the bilateral hands.  No pain with the range of motion of the bilateral hips. Mild bilateral trochanteric tenderness to palpation.  Vascular: Bilateral upper and lower extremities warm and well perfused, radial pulses 2+ bilaterally, dorsalis pedis pulses 2+ bilaterally.  Neurological: Normal strength and tone in all major motor groups in the bilateral upper and lower extremities. Normal sensation to light touch in the C5-T1 and L2-S1 dermatomes bilaterally.  Deep tendon reflexes symmetric 2+ in the bilateral upper and lower extremities.  Negative Inverted Radial Reflex and Hodge's bilaterally. Negative Babinski bilaterally. Negative straight leg raise " bilaterally.     IMAGING:   Today I personally reviewed AP, Lat and Flex/Ex  upright C-spine films that demonstrate mild C5/6 disc space narrowing.    AP, Lat and Flex/Ex upright lumbar spine films demonstrate mild L5/S1 disc space narrowing.  No instability.     Body mass index is 25.38 kg/m².    No results found for: HGBA1C      ASSESSMENT/PLAN:    Medardo was seen today for back pain, neck pain, arm pain and leg pain.    Diagnoses and all orders for this visit:    Neck pain  -     Ambulatory referral/consult to [facility]; Future    Idiopathic peripheral neuropathy  -     Ambulatory referral/consult to Orthopedics    Chronic left-sided low back pain without sciatica  -     Ambulatory referral/consult to [facility]; Future      Today we discussed at length all of the different treatment options including anti-inflammatories, acetaminophen, rest, ice, heat, physical therapy including strengthening and stretching exercises, home exercises, ROM, aerobic conditioning, aqua therapy, other modalities including ultrasound, massage, and dry needling, epidural steroid injections and finally surgical intervention.      Referral to the healthy back program placed today.  Follow up after therapy if symptoms persist.     This patient was referred by Mary Gonzalez DPM.  A copy of this report will be sent electronically.

## 2020-10-18 DIAGNOSIS — Z01.84 ANTIBODY RESPONSE EXAMINATION: ICD-10-CM

## 2020-11-17 DIAGNOSIS — Z01.84 ANTIBODY RESPONSE EXAMINATION: ICD-10-CM

## 2020-11-19 ENCOUNTER — CLINICAL SUPPORT (OUTPATIENT)
Dept: REHABILITATION | Facility: OTHER | Age: 50
End: 2020-11-19
Attending: PHYSICAL MEDICINE & REHABILITATION
Payer: COMMERCIAL

## 2020-11-19 VITALS
BODY MASS INDEX: 25.56 KG/M2 | WEIGHT: 168.63 LBS | HEART RATE: 60 BPM | HEIGHT: 68 IN | DIASTOLIC BLOOD PRESSURE: 82 MMHG | SYSTOLIC BLOOD PRESSURE: 119 MMHG

## 2020-11-19 DIAGNOSIS — G89.29 CHRONIC LEFT-SIDED LOW BACK PAIN WITHOUT SCIATICA: Primary | ICD-10-CM

## 2020-11-19 DIAGNOSIS — M54.50 CHRONIC LEFT-SIDED LOW BACK PAIN WITHOUT SCIATICA: Primary | ICD-10-CM

## 2020-11-19 PROCEDURE — 97750 PHYSICAL PERFORMANCE TEST: CPT | Mod: 32 | Performed by: PHYSICAL MEDICINE & REHABILITATION

## 2020-11-19 NOTE — PROGRESS NOTES
Subjective:      Patient ID: Medardo Vizcaino III is a 50 y.o. male.    Chief Complaint: No chief complaint on file.    Referred by: Magalie Moody PA-C     Mr Vizcaino is a 51 yo male sent in consultation by Magalie Moody for evaluation for the healthy back lumbar program.  he has had low back pain for 20 years.  He also has neck pain.  He has had 4 periods where is back went out.  He feels like back pain is more often.  The latest flare was 2 weeks ago after a massage and lasted 3 days.  The pain is low back dominant and also has pain at the top of the left foot, and feels like the numbness will go up the foot.  He occasionally has leg pain.  The pain is dull.  He has tingling and numbness the top of left foot.  He has burning in the toes of left foot.  He feels like low back is weak when flared. The pain is intermittent 0-6/10.  It is worse with bending, standing, sitting, sit to stand, changing positions in bed, morning and lifting.  The pain is better lying on both sides, walking, climbing stairs, massage, afternoon, evening, and bedtime.  He sees a chiropractor every other week.  He has not done, pt, injections or surgery.  His goals are lifting, bending, and standing.  Pattern 1    X-ray cervical 10/12/20  No significant alignment abnormality, and there is no evidence of translational instability at any cervical level on the dynamic flexion/extension radiographs.  No atlantoaxial subluxation.  Vertebral body heights are normally maintained, without compression deformity at any level.  There is mild disc narrowing at C5-6, with the other cervical discs normally maintained in height.  Marginal vertebral endplate spurring is seen at several cervical levels, particularly C5-6.  No oblique projections are available, but the AP projection does demonstrate bilateral uncovertebral spurring at C5-6, which could produce foraminal narrowing.  Neural arches appear intact, and the vertebral endplates are well  defined.  No osseous destructive process or prevertebral soft tissue swelling.     Impression:     Mild disc narrowing and marginal vertebral endplate and uncovertebral spurring are seen at the C5-6 level.  Examination is otherwise essentially unremarkable.    X-ray lumbar 10/12/20  No significant alignment abnormality, and there is no evidence of translational instability at any visualized thoracolumbar level on the weight-bearing flexion/extension radiographs.  Vertebral body heights are normally maintained, without compression deformity at any level.  No significant disc narrowing.  Vertebral endplates are well defined.  No osseous destructive process.  SI joints appear unremarkable.     Impression:     No significant abnormality.    Past Medical History:  3/20/2014: Acromioclavicular joint arthritis  No date: Hypothyroidism    Past Surgical History:  No date: NASAL SEPTUM SURGERY  No date: SHOULDER ARTHROSCOPY  No date: SHOULDER SURGERY  No date: TONSILLECTOMY  No date: TUMOR EXCISION      Comment:  arm; lipoma    Review of patient's family history indicates:  Problem: Hypertension      Relation: Father          Age of Onset: (Not Specified)  Problem: Pacemaker/defibrilator      Relation: Father          Age of Onset: (Not Specified)          Comment: Pacemaker  Problem: Diabetes      Relation: Maternal Grandmother          Age of Onset: (Not Specified)  Problem: Diabetes      Relation: Maternal Grandfather          Age of Onset: (Not Specified)  Problem: Heart disease      Relation: Paternal Grandfather          Age of Onset: (Not Specified)  Problem: Arthritis      Relation: Mother          Age of Onset: (Not Specified)  Problem: Hypothyroidism      Relation: Mother          Age of Onset: (Not Specified)  Problem: Hypothyroidism      Relation: Sister          Age of Onset: (Not Specified)  Problem: No Known Problems      Relation: Paternal Grandmother          Age of Onset: (Not Specified)  Problem: No Known  Problems      Relation: Maternal Aunt          Age of Onset: (Not Specified)  Problem: No Known Problems      Relation: Maternal Uncle          Age of Onset: (Not Specified)  Problem: No Known Problems      Relation: Paternal Aunt          Age of Onset: (Not Specified)  Problem: No Known Problems      Relation: Paternal Uncle          Age of Onset: (Not Specified)  Problem: Amblyopia      Relation: Neg Hx          Age of Onset: (Not Specified)  Problem: Blindness      Relation: Neg Hx          Age of Onset: (Not Specified)  Problem: Cancer      Relation: Neg Hx          Age of Onset: (Not Specified)  Problem: Cataracts      Relation: Neg Hx          Age of Onset: (Not Specified)  Problem: Glaucoma      Relation: Neg Hx          Age of Onset: (Not Specified)  Problem: Macular degeneration      Relation: Neg Hx          Age of Onset: (Not Specified)  Problem: Retinal detachment      Relation: Neg Hx          Age of Onset: (Not Specified)  Problem: Strabismus      Relation: Neg Hx          Age of Onset: (Not Specified)  Problem: Stroke      Relation: Neg Hx          Age of Onset: (Not Specified)  Problem: Thyroid disease      Relation: Neg Hx          Age of Onset: (Not Specified)  Problem: Psoriasis      Relation: Neg Hx          Age of Onset: (Not Specified)  Problem: Inflammatory bowel disease      Relation: Neg Hx          Age of Onset: (Not Specified)  Problem: Lupus      Relation: Neg Hx          Age of Onset: (Not Specified)      Social History    Socioeconomic History      Marital status: Unknown      Spouse name: Not on file      Number of children: Not on file      Years of education: Not on file      Highest education level: Not on file    Occupational History      Not on file    Social Needs      Financial resource strain: Not on file      Food insecurity        Worry: Not on file        Inability: Not on file      Transportation needs        Medical: Not on file        Non-medical: Not on file    Tobacco  Use      Smoking status: Never Smoker      Smokeless tobacco: Never Used      Tobacco comment: data analysis; ; no children    Substance and Sexual Activity      Alcohol use: Yes        Comment: one daily      Drug use: Yes        Types: Marijuana        Comment: daily since age 35; helps emotions and joint pain      Sexual activity: Yes        Partners: Female        Birth control/protection: Condom    Lifestyle      Physical activity        Days per week: Not on file        Minutes per session: Not on file      Stress: Not on file    Relationships      Social connections        Talks on phone: Not on file        Gets together: Not on file        Attends Tenriism service: Not on file        Active member of club or organization: Not on file        Attends meetings of clubs or organizations: Not on file        Relationship status: Not on file    Other Topics      Concerns:        Not on file    Social History Narrative      Not on file      Current Outpatient Medications:  diclofenac sodium (PENNSAID) 2 % SoPk, Apply 40 mg topically 2 (two) times daily. (Patient not taking: Reported on 8/7/2020), Disp: 112 g, Rfl: 1  levothyroxine (SYNTHROID) 75 MCG tablet, Take 1 tablet by mouth once daily, Disp: 90 tablet, Rfl: 0  triamterene-hydrochlorothiazide 75-50 mg (MAXZIDE) 75-50 mg per tablet, Take 1 tablet by mouth once daily. (Patient not taking: Reported on 9/14/2020), Disp: 90 tablet, Rfl: 1    No current facility-administered medications for this visit.       Review of patient's allergies indicates:   -- Sulfa (sulfonamide antibiotics) -- Hives              Review of Systems   Constitution: Negative for weight gain and weight loss.   Cardiovascular: Negative for chest pain.   Respiratory: Negative for shortness of breath.    Musculoskeletal: Positive for back pain. Negative for joint pain and joint swelling.   Gastrointestinal: Negative for abdominal pain, bowel incontinence, nausea and vomiting.    Genitourinary: Negative for bladder incontinence.   Neurological: Positive for numbness (left foot) and paresthesias.           Objective:          General    Vitals reviewed.  Constitutional: He is oriented to person, place, and time. He appears well-developed and well-nourished.   HENT:   Head: Normocephalic and atraumatic.   Pulmonary/Chest: Effort normal.   Neurological: He is alert and oriented to person, place, and time.   Psychiatric: He has a normal mood and affect. His behavior is normal. Judgment and thought content normal.     General Musculoskeletal Exam   Gait: normal     Right Ankle/Foot Exam     Tests   Heel Walk: able to perform  Tiptoe Walk: able to perform    Left Ankle/Foot Exam     Tests   Heel Walk: able to perform  Tiptoe Walk: able to perform  Back (L-Spine & T-Spine) / Neck (C-Spine) Exam     Tenderness Left paramedian tenderness of the Sacrum.     Back (L-Spine & T-Spine) Range of Motion   Extension: 30 (pain on left lower back)   Flexion: 90 (pain on left lower back)   Lateral bend right: 20 (pain on left lower back)   Lateral bend left: 20 (pain on left lower back)   Rotation right: 40 (pain on left lower back)   Rotation left: 40 (pain on left lower back)     Spinal Sensation   Right Side Sensation  C-Spine Level: normal   L-Spine Level: normal  S-Spine Level: normal  Left Side Sensation  C-Spine Level: normal  L-Spine Level: normal  S-Spine Level: normal    Back (L-Spine & T-Spine) Tests   Right Side Tests  Straight leg raise:      Sitting SLR: > 70 degrees      Left Side Tests  Straight leg raise:     Sitting SLR: > 70 degrees          Other He has no scoliosis .  Spinal Kyphosis:  Absent    Comments:  Pain in left back with MARCO A on left      Muscle Strength   Right Upper Extremity   Biceps: 5/5   Deltoid:  5/5  Triceps:  5/5  Wrist extension: 5/5   Finger Flexors:  5/5  Left Upper Extremity  Biceps: 5/5   Deltoid:  5/5  Triceps:  5/5  Wrist extension: 5/5   Finger Flexors:   5/5  Right Lower Extremity   Hip Flexion: 5/5   Quadriceps:  5/5   Anterior tibial:  5/5   EHL:  5/5  Left Lower Extremity   Hip Flexion: 5/5   Quadriceps:  5/5   Anterior tibial:  5/5   EHL:  5/5    Reflexes     Left Side  Biceps:  2+  Triceps:  2+  Brachioradialis:  2+  Achilles:  2+  Left Hodge's Sign:  Absent  Babinski Sign:  absent  Quadriceps:  2+    Right Side   Biceps:  2+  Triceps:  2+  Brachioradialis:  2+  Achilles:  2+  Right Hodge's Sign:  absent  Babinski Sign:  absent  Quadriceps:  2+    Vascular Exam     Right Pulses        Carotid:                  2+    Left Pulses        Carotid:                  2+        Assessment:       Encounter Diagnosis   Name Primary?    Chronic left-sided low back pain without sciatica Yes         Plan:       Diagnoses and all orders for this visit:    Chronic left-sided low back pain without sciatica  -     Ambulatory referral/consult to [facility]; Future         The patient has had a history of low back pain with limitations in there activities of Daily living    Previous treatment has not provided relief.    The situation was discussed at length with the patient.  We discussed different causes of back pain and different treatment options.  We discussed the importance of stretching and strengthening.  We discussed posture.  We discussed the pros and cons of further diagnostic testing, alternative treatment and Medications    Based on the history, physical exam, and functional index, an active physical therapy program is recommended.  The goal is to restore the patients function and reduce pain.  A program of progressive resistance exercises, biomechanical, and mobility maneuvers, instructions in proper body mechanics, aerobic conditioning and HEP will be utilized. The program will continue as long as making improvements.    An assessment of patients progress will be made at each visit to document change in status.    The patient will be actively involved  in there own treatment, and responsible for appointments and home program    The patient's lumbar isometric strength will be tested and they will be placed in a program of isolated strength training based on 50% of their total functional torque and advanced as clinically appropriate.      Directional preference of pain will further influence the patients active rehabilitation program    The patient was instructed there might be an initial increase in discomfort    They are enrolled with good prognosis  Pattern 1    He does have neck pain as well

## 2020-12-13 ENCOUNTER — OFFICE VISIT (OUTPATIENT)
Dept: URGENT CARE | Facility: CLINIC | Age: 50
End: 2020-12-13
Payer: COMMERCIAL

## 2020-12-13 VITALS
RESPIRATION RATE: 18 BRPM | BODY MASS INDEX: 24.25 KG/M2 | DIASTOLIC BLOOD PRESSURE: 76 MMHG | WEIGHT: 160 LBS | SYSTOLIC BLOOD PRESSURE: 123 MMHG | OXYGEN SATURATION: 100 % | HEIGHT: 68 IN | TEMPERATURE: 98 F | HEART RATE: 56 BPM

## 2020-12-13 DIAGNOSIS — S60.419A: ICD-10-CM

## 2020-12-13 DIAGNOSIS — S60.512A: ICD-10-CM

## 2020-12-13 DIAGNOSIS — S60.512A INFECTED ABRASION OF LEFT HAND, INITIAL ENCOUNTER: Primary | ICD-10-CM

## 2020-12-13 DIAGNOSIS — L08.9 INFECTED ABRASION OF LEFT HAND, INITIAL ENCOUNTER: Primary | ICD-10-CM

## 2020-12-13 PROCEDURE — 3008F BODY MASS INDEX DOCD: CPT | Mod: CPTII,S$GLB,, | Performed by: NURSE PRACTITIONER

## 2020-12-13 PROCEDURE — 3008F PR BODY MASS INDEX (BMI) DOCUMENTED: ICD-10-PCS | Mod: CPTII,S$GLB,, | Performed by: NURSE PRACTITIONER

## 2020-12-13 PROCEDURE — 99214 PR OFFICE/OUTPT VISIT, EST, LEVL IV, 30-39 MIN: ICD-10-PCS | Mod: S$GLB,,, | Performed by: NURSE PRACTITIONER

## 2020-12-13 PROCEDURE — 99214 OFFICE O/P EST MOD 30 MIN: CPT | Mod: S$GLB,,, | Performed by: NURSE PRACTITIONER

## 2020-12-13 RX ORDER — CEPHALEXIN 500 MG/1
500 TABLET ORAL 3 TIMES DAILY
Qty: 15 TABLET | Refills: 0 | Status: SHIPPED | OUTPATIENT
Start: 2020-12-13 | End: 2020-12-18

## 2020-12-13 NOTE — PATIENT INSTRUCTIONS
Skin Infection  If your condition worsens or fails to improve we recommend that you receive another evaluation at the ER immediately or contact your PCP to discuss your concerns or return here. You must understand that you've received an urgent care treatment only and that you may be released before all your medical problems are known or treated. You the patient will arrange for follouwp care as instructed.   Cool compressed to affected areas at least 2-3 times a day.  Avoid picking or manipulating the wound. Clean the wound twice a day and put the antibiotic ointment on it.   You should seek ER treatment if fever, increase pain, swelling, red streaks from affected area or other signs of increasing infection.   If you were prescribed antibiotics, please take them to completion  Tylenol or ibuprofen can also be used as directed for pain unless you have an allergy to them or medical condition such as stomach ulcers, kidney or liver disease or blood thinners etc for which you should not be taking these type of medications.   If not allergic, please take over the counter Tylenol (Acetaminophen) and/or Motrin (Ibuprofen) as directed for control of pain and/or fever.   Wound Check, Infection  You have a wound that has become infected. The wound will not heal properly unless the infection is cleared. Infection in a wound may also spread if it is not treated. In most cases, antibiotic medicines are prescribed to treat a wound infection.   Symptoms of a wound infection include:  · Redness or swelling around the wound  · Warmth coming from the wound  · New or worsening pain  · Red streaks around the wound  · Draining pus  · Fever  Home care  Follow all directions you are given to treat the infection.  Medicines  Take all medicines as prescribed.   · If you were given antibiotics, take them until they are gone or your healthcare provider tells you to stop. It is vital to finish the antibiotics even if you feel better. If you  do not finish them, the infection may come back and be harder to treat.  · If your infection is not responding to the medicines you are taking, you may be prescribed new medicines.  · Take medicine for pain as directed by your healthcare provider.  Wound care  Care for your wound as directed by your healthcare provider.  · Apply a warm compress (clean cloth soaked in hot water) to the infected area for about 5 to 10 minutes at a time. Be very careful not to burn yourself. Test the cloth on a non-infected area to make sure it is not too hot.  · Continue to change the dressing daily. If it becomes wet, stained with wound fluid, or dirty, change it sooner. To change it:  ¨ Wash your hands with soap and water before changing the dressing.  ¨ Carefully remove the dressing and tape. If it sticks to the wound, you may need to wet it a little to remove it. (Do not do this if your healthcare provider has told you not to.)  ¨ Gently clean the wound with clean water (or saline) using gauze, a clean washcloth, or cotton swab.  ¨ Do not use soap, alcohol, peroxide or other cleansers.  ¨ If you were told to dry the wound before putting on a new dressing, gently pat. Do not rub.  ¨ Throw out the old dressing.  ¨ Wash your hands again before opening the new, clean dressing.  ¨ Wash your hands again when you are done.  Follow-up care  Follow up with your healthcare provider as advised. If a culture was done, you will be notified if your treatment needs to change. Call as directed for the results.  When to seek medical advice  Call your health care provider right away if any of these occur:  · Symptoms of infection don't start to improve within 2 days of starting antibiotics  · Symptoms of infection get worse  · New symptoms, such as red streaks around the wound  · Fever of 100.4°F (38.0°C) or higher for more than 2 days after starting the antibiotics  Date Last Reviewed: 8/10/2015  © 5142-5791 [employer] [address] [place] [address]  [place] [address]. All rights reserved. This information is not intended as a substitute for professional medical care. Always follow your healthcare professional's instructions.        Abrasions  Abrasions are skin scrapes. Their treatment depends on how large and deep the abrasion is.  Home care  You may be prescribed an antibiotic cream or ointment to apply to the wound. This helps prevent infection. Follow instructions when using this medicine.  General care  · To care for the abrasion, do the following each day for as long as directed by your healthcare provider.  ¨ If you were given a bandage, change it once a day. If your bandage sticks to the wound, soak it in warm water until it loosens.  ¨ Wash the area with soap and warm water. You may do this in a sink or under a tub faucet or shower. Rinse off the soap. Then pat the area dry with a clean towel.  ¨ If antibiotic ointment or cream was prescribed, reapply it to the wound as directed. Cover the wound with a fresh nonstick bandage. If the bandage becomes wet or dirty, change it as soon as possible.  ¨ Some antibiotic ointments or cream can cause an allergic reaction or dermatitis. This may cause redness, itching and or hives. If this occurs, stop using the ointment immediately and wash off any remaining ointment. You may need to take some allergy medicine to relieve symptoms.  · You may use acetaminophen or ibuprofen to control pain unless another pain medicine was prescribed. Talk with your healthcare provider before using these medicines if you have chronic liver or kidney disease or ever had a stomach ulcer or GI bleeding. Dont use ibuprofen in children younger than six months old.  · Most skin wounds heal within 10 days. But an infection may occur even with treatment. So its important to watch the wound for signs of infection as listed below.  Follow-up care  Follow up with your healthcare provider, or as advised.  When to seek medical  advice  Call your healthcare provider right away if any of these occur:  · Fever of 100.4ºF (38ºC) or higher, or as directed by your healthcare provider  · Increasing pain, redness, swelling, or drainage from the wound  · Bleeding from the wound that does not stop after a few minutes of steady, firm pressure  · Decreased ability to move any body part near the wound  Date Last Reviewed: 3/3/2017  © 4945-1584 [employer]. [address] [place] [address], [place] [address]. All rights reserved. This information is not intended as a substitute for professional medical care. Always follow your healthcare professional's instructions.        Abrasions  Abrasions are skin scrapes. Their treatment depends on how large and deep the abrasion is.  Home care  You may be prescribed an antibiotic cream or ointment to apply to the wound. This helps prevent infection. Follow instructions when using this medicine.  General care  · To care for the abrasion, do the following each day for as long as directed by your healthcare provider.  ¨ If you were given a bandage, change it once a day. If your bandage sticks to the wound, soak it in warm water until it loosens.  ¨ Wash the area with soap and warm water. You may do this in a sink or under a tub faucet or shower. Rinse off the soap. Then pat the area dry with a clean towel.  ¨ If antibiotic ointment or cream was prescribed, reapply it to the wound as directed. Cover the wound with a fresh nonstick bandage. If the bandage becomes wet or dirty, change it as soon as possible.  ¨ Some antibiotic ointments or cream can cause an allergic reaction or dermatitis. This may cause redness, itching and or hives. If this occurs, stop using the ointment immediately and wash off any remaining ointment. You may need to take some allergy medicine to relieve symptoms.  · You may use acetaminophen or ibuprofen to control pain unless another pain medicine was prescribed. Talk with your healthcare  provider before using these medicines if you have chronic liver or kidney disease or ever had a stomach ulcer or GI bleeding. Dont use ibuprofen in children younger than six months old.  · Most skin wounds heal within 10 days. But an infection may occur even with treatment. So its important to watch the wound for signs of infection as listed below.  Follow-up care  Follow up with your healthcare provider, or as advised.  When to seek medical advice  Call your healthcare provider right away if any of these occur:  · Fever of 100.4ºF (38ºC) or higher, or as directed by your healthcare provider  · Increasing pain, redness, swelling, or drainage from the wound  · Bleeding from the wound that does not stop after a few minutes of steady, firm pressure  · Decreased ability to move any body part near the wound  Date Last Reviewed: 3/3/2017  © 8679-7845 [employer]. [address] [place] [address]. All rights reserved. This information is not intended as a substitute for professional medical care. Always follow your healthcare professional's instructions.

## 2020-12-13 NOTE — PROGRESS NOTES
"Subjective:       Patient ID: Medardo Vizcaino III is a 50 y.o. male.    Vitals:  height is 5' 8" (1.727 m) and weight is 72.6 kg (160 lb). His temperature is 97.9 °F (36.6 °C). His blood pressure is 123/76 and his pulse is 56 (abnormal). His respiration is 18 and oxygen saturation is 100%.     Chief Complaint: Rash    Pt would like a paper script if one is given......Pt states he have a rash on his left hand and not sure if can be from sticker bushes.     Rash  This is a new problem. The current episode started in the past 7 days. The problem is unchanged. The affected locations include the left hand. The rash is characterized by redness, scaling and dryness. It is unknown if there was an exposure to a precipitant. Pertinent negatives include no cough, fever or sore throat. Past treatments include nothing.       Constitution: Negative for chills and fever.   HENT: Negative for facial swelling and sore throat.    Neck: Negative for painful lymph nodes.   Eyes: Negative for eye itching and eyelid swelling.   Respiratory: Negative for cough.    Musculoskeletal: Negative for joint pain and joint swelling.   Skin: Positive for rash. Negative for color change, pale, wound, abrasion, laceration, lesion, skin thickening/induration, puncture wound, erythema, abscess, avulsion and hives.   Allergic/Immunologic: Negative for environmental allergies, immunocompromised state and hives.   Hematologic/Lymphatic: Negative for swollen lymph nodes.       Objective:      Physical Exam   Eyes: Pupils are equal, round, and reactive to light. Conjunctivae are normal. extraocular movement intact  Cardiovascular: Normal rate, regular rhythm, S1 normal, S2 normal, normal heart sounds and normal pulses. Exam reveals no decreased pulses.   Pulmonary/Chest: Effort normal and breath sounds normal. No accessory muscle usage. No respiratory distress. He has no decreased breath sounds. He has no wheezes. He has no rhonchi. He has no rales. "   Abdominal: Normal appearance.   Neurological: He is alert.   Skin: Skin is warm and dry. Abrasions - upper ext.:  Hand (left)  erythema       Vitals reviewed.            Assessment:       1. Infected abrasion of left hand, initial encounter    2. Abrasion of multiple sites of hand and finger, left, initial encounter        Plan:         Infected abrasion of left hand, initial encounter  -     Ambulatory referral/consult to Dermatology    Abrasion of multiple sites of hand and finger, left, initial encounter  -     Ambulatory referral/consult to Dermatology    Other orders  -     cephalexin (KEFTAB) 500 mg tablet; Take 1 tablet (500 mg total) by mouth 3 (three) times daily. for 5 days  Dispense: 15 tablet; Refill: 0      Patient Instructions   Skin Infection  If your condition worsens or fails to improve we recommend that you receive another evaluation at the ER immediately or contact your PCP to discuss your concerns or return here. You must understand that you've received an urgent care treatment only and that you may be released before all your medical problems are known or treated. You the patient will arrange for follouwp care as instructed.   Cool compressed to affected areas at least 2-3 times a day.  Avoid picking or manipulating the wound. Clean the wound twice a day and put the antibiotic ointment on it.   You should seek ER treatment if fever, increase pain, swelling, red streaks from affected area or other signs of increasing infection.   If you were prescribed antibiotics, please take them to completion  Tylenol or ibuprofen can also be used as directed for pain unless you have an allergy to them or medical condition such as stomach ulcers, kidney or liver disease or blood thinners etc for which you should not be taking these type of medications.   If not allergic, please take over the counter Tylenol (Acetaminophen) and/or Motrin (Ibuprofen) as directed for control of pain and/or fever.   Wound Check,  Infection  You have a wound that has become infected. The wound will not heal properly unless the infection is cleared. Infection in a wound may also spread if it is not treated. In most cases, antibiotic medicines are prescribed to treat a wound infection.   Symptoms of a wound infection include:  · Redness or swelling around the wound  · Warmth coming from the wound  · New or worsening pain  · Red streaks around the wound  · Draining pus  · Fever  Home care  Follow all directions you are given to treat the infection.  Medicines  Take all medicines as prescribed.   · If you were given antibiotics, take them until they are gone or your healthcare provider tells you to stop. It is vital to finish the antibiotics even if you feel better. If you do not finish them, the infection may come back and be harder to treat.  · If your infection is not responding to the medicines you are taking, you may be prescribed new medicines.  · Take medicine for pain as directed by your healthcare provider.  Wound care  Care for your wound as directed by your healthcare provider.  · Apply a warm compress (clean cloth soaked in hot water) to the infected area for about 5 to 10 minutes at a time. Be very careful not to burn yourself. Test the cloth on a non-infected area to make sure it is not too hot.  · Continue to change the dressing daily. If it becomes wet, stained with wound fluid, or dirty, change it sooner. To change it:  ¨ Wash your hands with soap and water before changing the dressing.  ¨ Carefully remove the dressing and tape. If it sticks to the wound, you may need to wet it a little to remove it. (Do not do this if your healthcare provider has told you not to.)  ¨ Gently clean the wound with clean water (or saline) using gauze, a clean washcloth, or cotton swab.  ¨ Do not use soap, alcohol, peroxide or other cleansers.  ¨ If you were told to dry the wound before putting on a new dressing, gently pat. Do not rub.  ¨ Throw out  the old dressing.  ¨ Wash your hands again before opening the new, clean dressing.  ¨ Wash your hands again when you are done.  Follow-up care  Follow up with your healthcare provider as advised. If a culture was done, you will be notified if your treatment needs to change. Call as directed for the results.  When to seek medical advice  Call your health care provider right away if any of these occur:  · Symptoms of infection don't start to improve within 2 days of starting antibiotics  · Symptoms of infection get worse  · New symptoms, such as red streaks around the wound  · Fever of 100.4°F (38.0°C) or higher for more than 2 days after starting the antibiotics  Date Last Reviewed: 8/10/2015  © 0947-3252 [employer]. [address] [place] [address], [place] [address]. All rights reserved. This information is not intended as a substitute for professional medical care. Always follow your healthcare professional's instructions.        Abrasions  Abrasions are skin scrapes. Their treatment depends on how large and deep the abrasion is.  Home care  You may be prescribed an antibiotic cream or ointment to apply to the wound. This helps prevent infection. Follow instructions when using this medicine.  General care  · To care for the abrasion, do the following each day for as long as directed by your healthcare provider.  ¨ If you were given a bandage, change it once a day. If your bandage sticks to the wound, soak it in warm water until it loosens.  ¨ Wash the area with soap and warm water. You may do this in a sink or under a tub faucet or shower. Rinse off the soap. Then pat the area dry with a clean towel.  ¨ If antibiotic ointment or cream was prescribed, reapply it to the wound as directed. Cover the wound with a fresh nonstick bandage. If the bandage becomes wet or dirty, change it as soon as possible.  ¨ Some antibiotic ointments or cream can cause an allergic reaction or dermatitis. This may cause redness,  itching and or hives. If this occurs, stop using the ointment immediately and wash off any remaining ointment. You may need to take some allergy medicine to relieve symptoms.  · You may use acetaminophen or ibuprofen to control pain unless another pain medicine was prescribed. Talk with your healthcare provider before using these medicines if you have chronic liver or kidney disease or ever had a stomach ulcer or GI bleeding. Dont use ibuprofen in children younger than six months old.  · Most skin wounds heal within 10 days. But an infection may occur even with treatment. So its important to watch the wound for signs of infection as listed below.  Follow-up care  Follow up with your healthcare provider, or as advised.  When to seek medical advice  Call your healthcare provider right away if any of these occur:  · Fever of 100.4ºF (38ºC) or higher, or as directed by your healthcare provider  · Increasing pain, redness, swelling, or drainage from the wound  · Bleeding from the wound that does not stop after a few minutes of steady, firm pressure  · Decreased ability to move any body part near the wound  Date Last Reviewed: 3/3/2017  © 3720-9757 [employer]. [address] [place] [address]. All rights reserved. This information is not intended as a substitute for professional medical care. Always follow your healthcare professional's instructions.        Abrasions  Abrasions are skin scrapes. Their treatment depends on how large and deep the abrasion is.  Home care  You may be prescribed an antibiotic cream or ointment to apply to the wound. This helps prevent infection. Follow instructions when using this medicine.  General care  · To care for the abrasion, do the following each day for as long as directed by your healthcare provider.  ¨ If you were given a bandage, change it once a day. If your bandage sticks to the wound, soak it in warm water until it loosens.  ¨ Wash the area with soap  and warm water. You may do this in a sink or under a tub faucet or shower. Rinse off the soap. Then pat the area dry with a clean towel.  ¨ If antibiotic ointment or cream was prescribed, reapply it to the wound as directed. Cover the wound with a fresh nonstick bandage. If the bandage becomes wet or dirty, change it as soon as possible.  ¨ Some antibiotic ointments or cream can cause an allergic reaction or dermatitis. This may cause redness, itching and or hives. If this occurs, stop using the ointment immediately and wash off any remaining ointment. You may need to take some allergy medicine to relieve symptoms.  · You may use acetaminophen or ibuprofen to control pain unless another pain medicine was prescribed. Talk with your healthcare provider before using these medicines if you have chronic liver or kidney disease or ever had a stomach ulcer or GI bleeding. Dont use ibuprofen in children younger than six months old.  · Most skin wounds heal within 10 days. But an infection may occur even with treatment. So its important to watch the wound for signs of infection as listed below.  Follow-up care  Follow up with your healthcare provider, or as advised.  When to seek medical advice  Call your healthcare provider right away if any of these occur:  · Fever of 100.4ºF (38ºC) or higher, or as directed by your healthcare provider  · Increasing pain, redness, swelling, or drainage from the wound  · Bleeding from the wound that does not stop after a few minutes of steady, firm pressure  · Decreased ability to move any body part near the wound  Date Last Reviewed: 3/3/2017  © 7666-3042 The "[employer]. [address] [place] [address], [place] [address]. All rights reserved. This information is not intended as a substitute for professional medical care. Always follow your healthcare professional's instructions.

## 2021-01-05 ENCOUNTER — CLINICAL SUPPORT (OUTPATIENT)
Dept: REHABILITATION | Facility: OTHER | Age: 51
End: 2021-01-05
Attending: PHYSICAL MEDICINE & REHABILITATION
Payer: COMMERCIAL

## 2021-01-05 ENCOUNTER — PATIENT OUTREACH (OUTPATIENT)
Dept: ADMINISTRATIVE | Facility: OTHER | Age: 51
End: 2021-01-05

## 2021-01-05 DIAGNOSIS — M54.50 CHRONIC LEFT-SIDED LOW BACK PAIN WITHOUT SCIATICA: ICD-10-CM

## 2021-01-05 DIAGNOSIS — R29.898 DECREASED STRENGTH OF TRUNK AND BACK: ICD-10-CM

## 2021-01-05 DIAGNOSIS — R20.2 LEFT LEG PARESTHESIAS: ICD-10-CM

## 2021-01-05 DIAGNOSIS — G89.29 CHRONIC LEFT-SIDED LOW BACK PAIN WITHOUT SCIATICA: ICD-10-CM

## 2021-01-05 PROCEDURE — 97750 PHYSICAL PERFORMANCE TEST: CPT | Mod: 32

## 2021-01-06 ENCOUNTER — OFFICE VISIT (OUTPATIENT)
Dept: ORTHOPEDICS | Facility: CLINIC | Age: 51
End: 2021-01-06
Payer: COMMERCIAL

## 2021-01-06 VITALS — WEIGHT: 169.88 LBS | HEIGHT: 68 IN | BODY MASS INDEX: 25.75 KG/M2

## 2021-01-06 DIAGNOSIS — M54.16 LUMBAR RADICULOPATHY: Primary | ICD-10-CM

## 2021-01-06 DIAGNOSIS — M54.9 DORSALGIA, UNSPECIFIED: ICD-10-CM

## 2021-01-06 PROBLEM — R29.898 DECREASED STRENGTH OF TRUNK AND BACK: Status: ACTIVE | Noted: 2021-01-06

## 2021-01-06 PROBLEM — R20.2 LEFT LEG PARESTHESIAS: Status: ACTIVE | Noted: 2021-01-06

## 2021-01-06 PROCEDURE — 3008F BODY MASS INDEX DOCD: CPT | Mod: CPTII,S$GLB,, | Performed by: ORTHOPAEDIC SURGERY

## 2021-01-06 PROCEDURE — 99213 PR OFFICE/OUTPT VISIT, EST, LEVL III, 20-29 MIN: ICD-10-PCS | Mod: S$GLB,,, | Performed by: ORTHOPAEDIC SURGERY

## 2021-01-06 PROCEDURE — 1125F AMNT PAIN NOTED PAIN PRSNT: CPT | Mod: S$GLB,,, | Performed by: ORTHOPAEDIC SURGERY

## 2021-01-06 PROCEDURE — 3008F PR BODY MASS INDEX (BMI) DOCUMENTED: ICD-10-PCS | Mod: CPTII,S$GLB,, | Performed by: ORTHOPAEDIC SURGERY

## 2021-01-06 PROCEDURE — 99213 OFFICE O/P EST LOW 20 MIN: CPT | Mod: S$GLB,,, | Performed by: ORTHOPAEDIC SURGERY

## 2021-01-06 PROCEDURE — 1125F PR PAIN SEVERITY QUANTIFIED, PAIN PRESENT: ICD-10-PCS | Mod: S$GLB,,, | Performed by: ORTHOPAEDIC SURGERY

## 2021-01-06 PROCEDURE — 99999 PR PBB SHADOW E&M-EST. PATIENT-LVL II: ICD-10-PCS | Mod: PBBFAC,,, | Performed by: ORTHOPAEDIC SURGERY

## 2021-01-06 PROCEDURE — 99999 PR PBB SHADOW E&M-EST. PATIENT-LVL II: CPT | Mod: PBBFAC,,, | Performed by: ORTHOPAEDIC SURGERY

## 2021-01-07 ENCOUNTER — CLINICAL SUPPORT (OUTPATIENT)
Dept: REHABILITATION | Facility: OTHER | Age: 51
End: 2021-01-07
Attending: PHYSICAL MEDICINE & REHABILITATION
Payer: COMMERCIAL

## 2021-01-07 DIAGNOSIS — R20.2 LEFT LEG PARESTHESIAS: Primary | ICD-10-CM

## 2021-01-07 DIAGNOSIS — R29.898 DECREASED STRENGTH OF TRUNK AND BACK: ICD-10-CM

## 2021-01-07 PROCEDURE — 97750 PHYSICAL PERFORMANCE TEST: CPT | Mod: 32

## 2021-01-12 ENCOUNTER — CLINICAL SUPPORT (OUTPATIENT)
Dept: REHABILITATION | Facility: OTHER | Age: 51
End: 2021-01-12
Attending: PHYSICAL MEDICINE & REHABILITATION
Payer: COMMERCIAL

## 2021-01-12 DIAGNOSIS — R20.2 LEFT LEG PARESTHESIAS: Primary | ICD-10-CM

## 2021-01-12 DIAGNOSIS — R29.898 DECREASED STRENGTH OF TRUNK AND BACK: ICD-10-CM

## 2021-01-12 PROCEDURE — 97750 PHYSICAL PERFORMANCE TEST: CPT | Mod: 32

## 2021-01-14 ENCOUNTER — CLINICAL SUPPORT (OUTPATIENT)
Dept: REHABILITATION | Facility: OTHER | Age: 51
End: 2021-01-14
Attending: PHYSICAL MEDICINE & REHABILITATION
Payer: COMMERCIAL

## 2021-01-14 DIAGNOSIS — R29.898 DECREASED STRENGTH OF TRUNK AND BACK: ICD-10-CM

## 2021-01-14 DIAGNOSIS — R20.2 LEFT LEG PARESTHESIAS: Primary | ICD-10-CM

## 2021-01-14 PROCEDURE — 97750 PHYSICAL PERFORMANCE TEST: CPT | Mod: 32

## 2021-01-15 ENCOUNTER — HOSPITAL ENCOUNTER (OUTPATIENT)
Dept: RADIOLOGY | Facility: HOSPITAL | Age: 51
Discharge: HOME | End: 2021-01-15
Attending: ORTHOPAEDIC SURGERY
Payer: COMMERCIAL

## 2021-01-15 DIAGNOSIS — M54.9 DORSALGIA, UNSPECIFIED: ICD-10-CM

## 2021-01-15 PROCEDURE — 72148 MRI LUMBAR SPINE W/O DYE: CPT | Mod: 26,,, | Performed by: RADIOLOGY

## 2021-01-15 PROCEDURE — 72148 MRI LUMBAR SPINE W/O DYE: CPT | Mod: TC

## 2021-01-15 PROCEDURE — 72148 MRI LUMBAR SPINE WITHOUT CONTRAST: ICD-10-PCS | Mod: 26,,, | Performed by: RADIOLOGY

## 2021-01-22 ENCOUNTER — OFFICE VISIT (OUTPATIENT)
Dept: ORTHOPEDICS | Facility: CLINIC | Age: 51
End: 2021-01-22
Payer: COMMERCIAL

## 2021-01-22 ENCOUNTER — OFFICE VISIT (OUTPATIENT)
Dept: SURGERY | Facility: CLINIC | Age: 51
End: 2021-01-22
Payer: COMMERCIAL

## 2021-01-22 VITALS
HEART RATE: 49 BPM | WEIGHT: 169.88 LBS | HEIGHT: 68 IN | SYSTOLIC BLOOD PRESSURE: 115 MMHG | BODY MASS INDEX: 25.75 KG/M2 | DIASTOLIC BLOOD PRESSURE: 72 MMHG

## 2021-01-22 DIAGNOSIS — M51.36 DDD (DEGENERATIVE DISC DISEASE), LUMBAR: Primary | ICD-10-CM

## 2021-01-22 DIAGNOSIS — D17.21 LIPOMA OF RIGHT UPPER EXTREMITY: Primary | ICD-10-CM

## 2021-01-22 PROCEDURE — 99999 PR PBB SHADOW E&M-EST. PATIENT-LVL II: ICD-10-PCS | Mod: PBBFAC,,, | Performed by: ORTHOPAEDIC SURGERY

## 2021-01-22 PROCEDURE — 3008F PR BODY MASS INDEX (BMI) DOCUMENTED: ICD-10-PCS | Mod: CPTII,S$GLB,, | Performed by: SURGERY

## 2021-01-22 PROCEDURE — 99213 PR OFFICE/OUTPT VISIT, EST, LEVL III, 20-29 MIN: ICD-10-PCS | Mod: S$GLB,,, | Performed by: SURGERY

## 2021-01-22 PROCEDURE — 99213 PR OFFICE/OUTPT VISIT, EST, LEVL III, 20-29 MIN: ICD-10-PCS | Mod: S$GLB,,, | Performed by: ORTHOPAEDIC SURGERY

## 2021-01-22 PROCEDURE — 99213 OFFICE O/P EST LOW 20 MIN: CPT | Mod: S$GLB,,, | Performed by: SURGERY

## 2021-01-22 PROCEDURE — 1125F AMNT PAIN NOTED PAIN PRSNT: CPT | Mod: S$GLB,,, | Performed by: ORTHOPAEDIC SURGERY

## 2021-01-22 PROCEDURE — 1125F PR PAIN SEVERITY QUANTIFIED, PAIN PRESENT: ICD-10-PCS | Mod: S$GLB,,, | Performed by: ORTHOPAEDIC SURGERY

## 2021-01-22 PROCEDURE — 99999 PR PBB SHADOW E&M-EST. PATIENT-LVL III: ICD-10-PCS | Mod: PBBFAC,,, | Performed by: SURGERY

## 2021-01-22 PROCEDURE — 99999 PR PBB SHADOW E&M-EST. PATIENT-LVL II: CPT | Mod: PBBFAC,,, | Performed by: ORTHOPAEDIC SURGERY

## 2021-01-22 PROCEDURE — 99999 PR PBB SHADOW E&M-EST. PATIENT-LVL III: CPT | Mod: PBBFAC,,, | Performed by: SURGERY

## 2021-01-22 PROCEDURE — 1126F PR PAIN SEVERITY QUANTIFIED, NO PAIN PRESENT: ICD-10-PCS | Mod: S$GLB,,, | Performed by: SURGERY

## 2021-01-22 PROCEDURE — 3008F BODY MASS INDEX DOCD: CPT | Mod: CPTII,S$GLB,, | Performed by: SURGERY

## 2021-01-22 PROCEDURE — 99213 OFFICE O/P EST LOW 20 MIN: CPT | Mod: S$GLB,,, | Performed by: ORTHOPAEDIC SURGERY

## 2021-01-22 PROCEDURE — 1126F AMNT PAIN NOTED NONE PRSNT: CPT | Mod: S$GLB,,, | Performed by: SURGERY

## 2021-02-02 ENCOUNTER — CLINICAL SUPPORT (OUTPATIENT)
Dept: REHABILITATION | Facility: OTHER | Age: 51
End: 2021-02-02
Attending: PHYSICAL MEDICINE & REHABILITATION
Payer: COMMERCIAL

## 2021-02-02 DIAGNOSIS — R29.898 DECREASED STRENGTH OF TRUNK AND BACK: ICD-10-CM

## 2021-02-02 DIAGNOSIS — R20.2 LEFT LEG PARESTHESIAS: Primary | ICD-10-CM

## 2021-02-02 PROCEDURE — 97750 PHYSICAL PERFORMANCE TEST: CPT | Mod: 32

## 2021-02-05 ENCOUNTER — PROCEDURE VISIT (OUTPATIENT)
Dept: SURGERY | Facility: CLINIC | Age: 51
End: 2021-02-05
Payer: COMMERCIAL

## 2021-02-05 VITALS
HEIGHT: 69 IN | BODY MASS INDEX: 24.75 KG/M2 | HEART RATE: 60 BPM | SYSTOLIC BLOOD PRESSURE: 117 MMHG | WEIGHT: 167.13 LBS | DIASTOLIC BLOOD PRESSURE: 62 MMHG

## 2021-02-05 DIAGNOSIS — R22.31 MASS OF FOREARM, RIGHT: Primary | ICD-10-CM

## 2021-02-05 PROCEDURE — 88305 TISSUE EXAM BY PATHOLOGIST: CPT | Mod: 26,,, | Performed by: STUDENT IN AN ORGANIZED HEALTH CARE EDUCATION/TRAINING PROGRAM

## 2021-02-05 PROCEDURE — 25075 EXC, TUMOR SOFT TISSUE: ICD-10-PCS | Mod: RT,S$GLB,, | Performed by: SURGERY

## 2021-02-05 PROCEDURE — 88305 TISSUE EXAM BY PATHOLOGIST: CPT | Performed by: STUDENT IN AN ORGANIZED HEALTH CARE EDUCATION/TRAINING PROGRAM

## 2021-02-05 PROCEDURE — 88305 TISSUE EXAM BY PATHOLOGIST: ICD-10-PCS | Mod: 26,,, | Performed by: STUDENT IN AN ORGANIZED HEALTH CARE EDUCATION/TRAINING PROGRAM

## 2021-02-05 PROCEDURE — 25075 EXC FOREARM LES SC < 3 CM: CPT | Mod: RT,S$GLB,, | Performed by: SURGERY

## 2021-02-09 ENCOUNTER — CLINICAL SUPPORT (OUTPATIENT)
Dept: REHABILITATION | Facility: OTHER | Age: 51
End: 2021-02-09
Attending: PHYSICAL MEDICINE & REHABILITATION
Payer: COMMERCIAL

## 2021-02-09 DIAGNOSIS — R29.898 DECREASED STRENGTH OF TRUNK AND BACK: ICD-10-CM

## 2021-02-09 DIAGNOSIS — R20.2 LEFT LEG PARESTHESIAS: Primary | ICD-10-CM

## 2021-02-09 PROCEDURE — 97750 PHYSICAL PERFORMANCE TEST: CPT | Mod: 32

## 2021-02-10 LAB
FINAL PATHOLOGIC DIAGNOSIS: NORMAL
GROSS: NORMAL
MICROSCOPIC EXAM: NORMAL

## 2021-02-11 ENCOUNTER — CLINICAL SUPPORT (OUTPATIENT)
Dept: REHABILITATION | Facility: OTHER | Age: 51
End: 2021-02-11
Attending: PHYSICAL MEDICINE & REHABILITATION
Payer: COMMERCIAL

## 2021-02-11 DIAGNOSIS — R20.2 LEFT LEG PARESTHESIAS: Primary | ICD-10-CM

## 2021-02-11 DIAGNOSIS — R29.898 DECREASED STRENGTH OF TRUNK AND BACK: ICD-10-CM

## 2021-02-11 PROCEDURE — 97750 PHYSICAL PERFORMANCE TEST: CPT | Mod: 32

## 2021-02-17 RX ORDER — LEVOTHYROXINE SODIUM 75 UG/1
TABLET ORAL
Qty: 90 TABLET | Refills: 2 | Status: SHIPPED | OUTPATIENT
Start: 2021-02-17 | End: 2021-10-01

## 2021-02-18 ENCOUNTER — CLINICAL SUPPORT (OUTPATIENT)
Dept: REHABILITATION | Facility: OTHER | Age: 51
End: 2021-02-18
Attending: PHYSICAL MEDICINE & REHABILITATION
Payer: COMMERCIAL

## 2021-02-18 DIAGNOSIS — R20.2 LEFT LEG PARESTHESIAS: Primary | ICD-10-CM

## 2021-02-18 DIAGNOSIS — R29.898 DECREASED STRENGTH OF TRUNK AND BACK: ICD-10-CM

## 2021-02-18 PROCEDURE — 97750 PHYSICAL PERFORMANCE TEST: CPT | Mod: 32

## 2021-02-22 ENCOUNTER — OFFICE VISIT (OUTPATIENT)
Dept: SURGERY | Facility: CLINIC | Age: 51
End: 2021-02-22
Payer: COMMERCIAL

## 2021-02-22 VITALS
SYSTOLIC BLOOD PRESSURE: 122 MMHG | WEIGHT: 173.19 LBS | HEIGHT: 68 IN | DIASTOLIC BLOOD PRESSURE: 77 MMHG | BODY MASS INDEX: 26.25 KG/M2 | HEART RATE: 56 BPM

## 2021-02-22 DIAGNOSIS — Z09 POSTOP CHECK: Primary | ICD-10-CM

## 2021-02-22 PROCEDURE — 99999 PR PBB SHADOW E&M-EST. PATIENT-LVL III: ICD-10-PCS | Mod: PBBFAC,,, | Performed by: SURGERY

## 2021-02-22 PROCEDURE — 99999 PR PBB SHADOW E&M-EST. PATIENT-LVL III: CPT | Mod: PBBFAC,,, | Performed by: SURGERY

## 2021-02-22 PROCEDURE — 3008F PR BODY MASS INDEX (BMI) DOCUMENTED: ICD-10-PCS | Mod: CPTII,S$GLB,, | Performed by: SURGERY

## 2021-02-22 PROCEDURE — 1126F PR PAIN SEVERITY QUANTIFIED, NO PAIN PRESENT: ICD-10-PCS | Mod: S$GLB,,, | Performed by: SURGERY

## 2021-02-22 PROCEDURE — 99024 POSTOP FOLLOW-UP VISIT: CPT | Mod: S$GLB,,, | Performed by: SURGERY

## 2021-02-22 PROCEDURE — 99024 PR POST-OP FOLLOW-UP VISIT: ICD-10-PCS | Mod: S$GLB,,, | Performed by: SURGERY

## 2021-02-22 PROCEDURE — 1126F AMNT PAIN NOTED NONE PRSNT: CPT | Mod: S$GLB,,, | Performed by: SURGERY

## 2021-02-22 PROCEDURE — 3008F BODY MASS INDEX DOCD: CPT | Mod: CPTII,S$GLB,, | Performed by: SURGERY

## 2021-02-23 ENCOUNTER — CLINICAL SUPPORT (OUTPATIENT)
Dept: REHABILITATION | Facility: OTHER | Age: 51
End: 2021-02-23
Attending: PHYSICAL MEDICINE & REHABILITATION
Payer: COMMERCIAL

## 2021-02-23 DIAGNOSIS — R29.898 DECREASED STRENGTH OF TRUNK AND BACK: ICD-10-CM

## 2021-02-23 DIAGNOSIS — R20.2 LEFT LEG PARESTHESIAS: Primary | ICD-10-CM

## 2021-02-23 PROCEDURE — 97750 PHYSICAL PERFORMANCE TEST: CPT | Mod: 32

## 2021-02-25 ENCOUNTER — CLINICAL SUPPORT (OUTPATIENT)
Dept: REHABILITATION | Facility: OTHER | Age: 51
End: 2021-02-25
Attending: PHYSICAL MEDICINE & REHABILITATION
Payer: COMMERCIAL

## 2021-02-25 DIAGNOSIS — R20.2 LEFT LEG PARESTHESIAS: Primary | ICD-10-CM

## 2021-02-25 DIAGNOSIS — R29.898 DECREASED STRENGTH OF TRUNK AND BACK: ICD-10-CM

## 2021-02-25 PROCEDURE — 97750 PHYSICAL PERFORMANCE TEST: CPT | Mod: 32

## 2021-03-02 ENCOUNTER — CLINICAL SUPPORT (OUTPATIENT)
Dept: REHABILITATION | Facility: OTHER | Age: 51
End: 2021-03-02
Attending: PHYSICAL MEDICINE & REHABILITATION
Payer: COMMERCIAL

## 2021-03-02 DIAGNOSIS — R29.898 DECREASED STRENGTH OF TRUNK AND BACK: ICD-10-CM

## 2021-03-02 DIAGNOSIS — R20.2 LEFT LEG PARESTHESIAS: Primary | ICD-10-CM

## 2021-03-02 PROCEDURE — 97750 PHYSICAL PERFORMANCE TEST: CPT | Mod: 32

## 2021-03-04 ENCOUNTER — CLINICAL SUPPORT (OUTPATIENT)
Dept: REHABILITATION | Facility: OTHER | Age: 51
End: 2021-03-04
Attending: PHYSICAL MEDICINE & REHABILITATION
Payer: COMMERCIAL

## 2021-03-04 DIAGNOSIS — R29.898 DECREASED STRENGTH OF TRUNK AND BACK: ICD-10-CM

## 2021-03-04 DIAGNOSIS — R20.2 LEFT LEG PARESTHESIAS: Primary | ICD-10-CM

## 2021-03-04 PROCEDURE — 97750 PHYSICAL PERFORMANCE TEST: CPT | Mod: 32

## 2021-03-09 ENCOUNTER — CLINICAL SUPPORT (OUTPATIENT)
Dept: REHABILITATION | Facility: OTHER | Age: 51
End: 2021-03-09
Attending: PHYSICAL MEDICINE & REHABILITATION
Payer: COMMERCIAL

## 2021-03-09 DIAGNOSIS — R20.2 LEFT LEG PARESTHESIAS: Primary | ICD-10-CM

## 2021-03-09 DIAGNOSIS — R29.898 DECREASED STRENGTH OF TRUNK AND BACK: ICD-10-CM

## 2021-03-09 PROCEDURE — 97750 PHYSICAL PERFORMANCE TEST: CPT | Mod: 32

## 2021-03-16 ENCOUNTER — CLINICAL SUPPORT (OUTPATIENT)
Dept: REHABILITATION | Facility: OTHER | Age: 51
End: 2021-03-16
Attending: PHYSICAL MEDICINE & REHABILITATION
Payer: COMMERCIAL

## 2021-03-16 DIAGNOSIS — R29.898 DECREASED STRENGTH OF TRUNK AND BACK: ICD-10-CM

## 2021-03-16 DIAGNOSIS — R20.2 LEFT LEG PARESTHESIAS: Primary | ICD-10-CM

## 2021-03-16 PROCEDURE — 97750 PHYSICAL PERFORMANCE TEST: CPT | Mod: 32

## 2021-03-20 ENCOUNTER — IMMUNIZATION (OUTPATIENT)
Dept: PRIMARY CARE CLINIC | Facility: CLINIC | Age: 51
End: 2021-03-20
Payer: COMMERCIAL

## 2021-03-20 DIAGNOSIS — Z23 NEED FOR VACCINATION: Primary | ICD-10-CM

## 2021-03-20 PROCEDURE — 91300 PR SARS-COV- 2 COVID-19 VACCINE, NO PRSV, 30MCG/0.3ML, IM: CPT | Mod: S$GLB,,, | Performed by: INTERNAL MEDICINE

## 2021-03-20 PROCEDURE — 0001A PR IMMUNIZ ADMIN, SARS-COV-2 COVID-19 VACC, 30MCG/0.3ML, 1ST DOSE: ICD-10-PCS | Mod: CV19,S$GLB,, | Performed by: INTERNAL MEDICINE

## 2021-03-20 PROCEDURE — 91300 PR SARS-COV- 2 COVID-19 VACCINE, NO PRSV, 30MCG/0.3ML, IM: ICD-10-PCS | Mod: S$GLB,,, | Performed by: INTERNAL MEDICINE

## 2021-03-20 PROCEDURE — 0001A PR IMMUNIZ ADMIN, SARS-COV-2 COVID-19 VACC, 30MCG/0.3ML, 1ST DOSE: CPT | Mod: CV19,S$GLB,, | Performed by: INTERNAL MEDICINE

## 2021-03-20 RX ADMIN — Medication 0.3 ML: at 12:03

## 2021-03-23 ENCOUNTER — CLINICAL SUPPORT (OUTPATIENT)
Dept: REHABILITATION | Facility: OTHER | Age: 51
End: 2021-03-23
Attending: PHYSICAL MEDICINE & REHABILITATION
Payer: COMMERCIAL

## 2021-03-23 DIAGNOSIS — R20.2 LEFT LEG PARESTHESIAS: Primary | ICD-10-CM

## 2021-03-23 DIAGNOSIS — R29.898 DECREASED STRENGTH OF TRUNK AND BACK: ICD-10-CM

## 2021-03-23 PROCEDURE — 97750 PHYSICAL PERFORMANCE TEST: CPT | Mod: 32

## 2021-03-25 ENCOUNTER — CLINICAL SUPPORT (OUTPATIENT)
Dept: REHABILITATION | Facility: OTHER | Age: 51
End: 2021-03-25
Attending: PHYSICAL MEDICINE & REHABILITATION
Payer: COMMERCIAL

## 2021-03-25 DIAGNOSIS — R20.2 LEFT LEG PARESTHESIAS: Primary | ICD-10-CM

## 2021-03-25 DIAGNOSIS — R29.898 DECREASED STRENGTH OF TRUNK AND BACK: ICD-10-CM

## 2021-03-25 PROCEDURE — 97750 PHYSICAL PERFORMANCE TEST: CPT | Mod: 32

## 2021-03-30 ENCOUNTER — CLINICAL SUPPORT (OUTPATIENT)
Dept: REHABILITATION | Facility: OTHER | Age: 51
End: 2021-03-30
Attending: PHYSICAL MEDICINE & REHABILITATION
Payer: COMMERCIAL

## 2021-03-30 DIAGNOSIS — R20.2 LEFT LEG PARESTHESIAS: Primary | ICD-10-CM

## 2021-03-30 DIAGNOSIS — R29.898 DECREASED STRENGTH OF TRUNK AND BACK: ICD-10-CM

## 2021-03-30 PROCEDURE — 97750 PHYSICAL PERFORMANCE TEST: CPT | Mod: 32

## 2021-04-01 ENCOUNTER — CLINICAL SUPPORT (OUTPATIENT)
Dept: REHABILITATION | Facility: OTHER | Age: 51
End: 2021-04-01
Attending: PHYSICAL MEDICINE & REHABILITATION
Payer: COMMERCIAL

## 2021-04-01 DIAGNOSIS — R29.898 DECREASED STRENGTH OF TRUNK AND BACK: ICD-10-CM

## 2021-04-01 DIAGNOSIS — R20.2 LEFT LEG PARESTHESIAS: Primary | ICD-10-CM

## 2021-04-01 PROCEDURE — 97750 PHYSICAL PERFORMANCE TEST: CPT | Mod: 32

## 2021-04-06 ENCOUNTER — CLINICAL SUPPORT (OUTPATIENT)
Dept: REHABILITATION | Facility: OTHER | Age: 51
End: 2021-04-06
Attending: PHYSICAL MEDICINE & REHABILITATION
Payer: COMMERCIAL

## 2021-04-06 DIAGNOSIS — R20.2 LEFT LEG PARESTHESIAS: Primary | ICD-10-CM

## 2021-04-06 DIAGNOSIS — R29.898 DECREASED STRENGTH OF TRUNK AND BACK: ICD-10-CM

## 2021-04-06 PROCEDURE — 97750 PHYSICAL PERFORMANCE TEST: CPT | Mod: 32

## 2021-04-08 ENCOUNTER — CLINICAL SUPPORT (OUTPATIENT)
Dept: REHABILITATION | Facility: OTHER | Age: 51
End: 2021-04-08
Attending: PHYSICAL MEDICINE & REHABILITATION
Payer: COMMERCIAL

## 2021-04-08 DIAGNOSIS — R29.898 DECREASED STRENGTH OF TRUNK AND BACK: ICD-10-CM

## 2021-04-08 DIAGNOSIS — R20.2 LEFT LEG PARESTHESIAS: Primary | ICD-10-CM

## 2021-04-08 PROCEDURE — 97750 PHYSICAL PERFORMANCE TEST: CPT | Mod: 32

## 2021-04-11 ENCOUNTER — IMMUNIZATION (OUTPATIENT)
Dept: PRIMARY CARE CLINIC | Facility: CLINIC | Age: 51
End: 2021-04-11
Payer: COMMERCIAL

## 2021-04-11 DIAGNOSIS — Z23 NEED FOR VACCINATION: Primary | ICD-10-CM

## 2021-04-11 PROCEDURE — 0002A PR IMMUNIZ ADMIN, SARS-COV-2 COVID-19 VACC, 30MCG/0.3ML, 2ND DOSE: ICD-10-PCS | Mod: CV19,S$GLB,, | Performed by: INTERNAL MEDICINE

## 2021-04-11 PROCEDURE — 0002A PR IMMUNIZ ADMIN, SARS-COV-2 COVID-19 VACC, 30MCG/0.3ML, 2ND DOSE: CPT | Mod: CV19,S$GLB,, | Performed by: INTERNAL MEDICINE

## 2021-04-11 PROCEDURE — 91300 PR SARS-COV- 2 COVID-19 VACCINE, NO PRSV, 30MCG/0.3ML, IM: ICD-10-PCS | Mod: S$GLB,,, | Performed by: INTERNAL MEDICINE

## 2021-04-11 PROCEDURE — 91300 PR SARS-COV- 2 COVID-19 VACCINE, NO PRSV, 30MCG/0.3ML, IM: CPT | Mod: S$GLB,,, | Performed by: INTERNAL MEDICINE

## 2021-04-11 RX ADMIN — Medication 0.3 ML: at 11:04

## 2021-04-20 ENCOUNTER — DOCUMENTATION ONLY (OUTPATIENT)
Dept: REHABILITATION | Facility: OTHER | Age: 51
End: 2021-04-20
Attending: PHYSICAL MEDICINE & REHABILITATION
Payer: COMMERCIAL

## 2021-04-27 ENCOUNTER — DOCUMENTATION ONLY (OUTPATIENT)
Dept: REHABILITATION | Facility: OTHER | Age: 51
End: 2021-04-27
Attending: PHYSICAL MEDICINE & REHABILITATION
Payer: COMMERCIAL

## 2021-05-06 ENCOUNTER — DOCUMENTATION ONLY (OUTPATIENT)
Dept: REHABILITATION | Facility: OTHER | Age: 51
End: 2021-05-06
Attending: PHYSICAL MEDICINE & REHABILITATION
Payer: COMMERCIAL

## 2021-05-18 ENCOUNTER — DOCUMENTATION ONLY (OUTPATIENT)
Dept: REHABILITATION | Facility: OTHER | Age: 51
End: 2021-05-18
Attending: PHYSICAL MEDICINE & REHABILITATION
Payer: COMMERCIAL

## 2021-05-25 ENCOUNTER — DOCUMENTATION ONLY (OUTPATIENT)
Dept: REHABILITATION | Facility: OTHER | Age: 51
End: 2021-05-25
Attending: PHYSICAL MEDICINE & REHABILITATION
Payer: COMMERCIAL

## 2021-06-01 ENCOUNTER — DOCUMENTATION ONLY (OUTPATIENT)
Dept: REHABILITATION | Facility: OTHER | Age: 51
End: 2021-06-01
Attending: PHYSICAL MEDICINE & REHABILITATION
Payer: COMMERCIAL

## 2021-06-04 ENCOUNTER — TELEPHONE (OUTPATIENT)
Dept: SPORTS MEDICINE | Facility: CLINIC | Age: 51
End: 2021-06-04

## 2021-06-08 ENCOUNTER — DOCUMENTATION ONLY (OUTPATIENT)
Dept: REHABILITATION | Facility: OTHER | Age: 51
End: 2021-06-08
Attending: PHYSICAL MEDICINE & REHABILITATION
Payer: COMMERCIAL

## 2021-06-14 ENCOUNTER — TELEPHONE (OUTPATIENT)
Dept: SPORTS MEDICINE | Facility: CLINIC | Age: 51
End: 2021-06-14

## 2021-06-15 ENCOUNTER — DOCUMENTATION ONLY (OUTPATIENT)
Dept: REHABILITATION | Facility: OTHER | Age: 51
End: 2021-06-15
Attending: PHYSICAL MEDICINE & REHABILITATION
Payer: COMMERCIAL

## 2021-06-22 ENCOUNTER — DOCUMENTATION ONLY (OUTPATIENT)
Dept: REHABILITATION | Facility: OTHER | Age: 51
End: 2021-06-22
Attending: PHYSICAL MEDICINE & REHABILITATION
Payer: COMMERCIAL

## 2021-06-29 ENCOUNTER — DOCUMENTATION ONLY (OUTPATIENT)
Dept: REHABILITATION | Facility: OTHER | Age: 51
End: 2021-06-29
Attending: PHYSICAL MEDICINE & REHABILITATION
Payer: COMMERCIAL

## 2021-06-30 ENCOUNTER — PATIENT OUTREACH (OUTPATIENT)
Dept: ADMINISTRATIVE | Facility: OTHER | Age: 51
End: 2021-06-30

## 2021-07-01 ENCOUNTER — OFFICE VISIT (OUTPATIENT)
Dept: SPORTS MEDICINE | Facility: CLINIC | Age: 51
End: 2021-07-01
Payer: COMMERCIAL

## 2021-07-01 VITALS
WEIGHT: 173 LBS | BODY MASS INDEX: 26.22 KG/M2 | HEIGHT: 68 IN | HEART RATE: 53 BPM | SYSTOLIC BLOOD PRESSURE: 121 MMHG | DIASTOLIC BLOOD PRESSURE: 69 MMHG

## 2021-07-01 DIAGNOSIS — M25.512 LEFT SHOULDER PAIN, UNSPECIFIED CHRONICITY: Primary | ICD-10-CM

## 2021-07-01 DIAGNOSIS — M54.2 CERVICALGIA: ICD-10-CM

## 2021-07-01 PROCEDURE — 99214 PR OFFICE/OUTPT VISIT, EST, LEVL IV, 30-39 MIN: ICD-10-PCS | Mod: S$GLB,,, | Performed by: ORTHOPAEDIC SURGERY

## 2021-07-01 PROCEDURE — 99999 PR PBB SHADOW E&M-EST. PATIENT-LVL III: ICD-10-PCS | Mod: PBBFAC,,, | Performed by: ORTHOPAEDIC SURGERY

## 2021-07-01 PROCEDURE — 1125F AMNT PAIN NOTED PAIN PRSNT: CPT | Mod: S$GLB,,, | Performed by: ORTHOPAEDIC SURGERY

## 2021-07-01 PROCEDURE — 99214 OFFICE O/P EST MOD 30 MIN: CPT | Mod: S$GLB,,, | Performed by: ORTHOPAEDIC SURGERY

## 2021-07-01 PROCEDURE — 99999 PR PBB SHADOW E&M-EST. PATIENT-LVL III: CPT | Mod: PBBFAC,,, | Performed by: ORTHOPAEDIC SURGERY

## 2021-07-01 PROCEDURE — 3008F BODY MASS INDEX DOCD: CPT | Mod: CPTII,S$GLB,, | Performed by: ORTHOPAEDIC SURGERY

## 2021-07-01 PROCEDURE — 1125F PR PAIN SEVERITY QUANTIFIED, PAIN PRESENT: ICD-10-PCS | Mod: S$GLB,,, | Performed by: ORTHOPAEDIC SURGERY

## 2021-07-01 PROCEDURE — 3008F PR BODY MASS INDEX (BMI) DOCUMENTED: ICD-10-PCS | Mod: CPTII,S$GLB,, | Performed by: ORTHOPAEDIC SURGERY

## 2021-07-06 ENCOUNTER — DOCUMENTATION ONLY (OUTPATIENT)
Dept: REHABILITATION | Facility: OTHER | Age: 51
End: 2021-07-06
Attending: PHYSICAL MEDICINE & REHABILITATION
Payer: COMMERCIAL

## 2021-07-13 ENCOUNTER — DOCUMENTATION ONLY (OUTPATIENT)
Dept: REHABILITATION | Facility: OTHER | Age: 51
End: 2021-07-13
Attending: PHYSICAL MEDICINE & REHABILITATION
Payer: COMMERCIAL

## 2021-07-20 ENCOUNTER — DOCUMENTATION ONLY (OUTPATIENT)
Dept: REHABILITATION | Facility: OTHER | Age: 51
End: 2021-07-20
Attending: PHYSICAL MEDICINE & REHABILITATION
Payer: COMMERCIAL

## 2021-07-22 ENCOUNTER — HOSPITAL ENCOUNTER (OUTPATIENT)
Dept: RADIOLOGY | Facility: HOSPITAL | Age: 51
Discharge: HOME | End: 2021-07-22
Attending: STUDENT IN AN ORGANIZED HEALTH CARE EDUCATION/TRAINING PROGRAM
Payer: COMMERCIAL

## 2021-07-22 DIAGNOSIS — M54.2 CERVICALGIA: ICD-10-CM

## 2021-07-22 PROCEDURE — 72141 MRI CERVICAL SPINE WITHOUT CONTRAST: ICD-10-PCS | Mod: 26,,, | Performed by: RADIOLOGY

## 2021-07-22 PROCEDURE — 72141 MRI NECK SPINE W/O DYE: CPT | Mod: 26,,, | Performed by: RADIOLOGY

## 2021-07-22 PROCEDURE — 72141 MRI NECK SPINE W/O DYE: CPT | Mod: TC

## 2021-07-27 ENCOUNTER — DOCUMENTATION ONLY (OUTPATIENT)
Dept: REHABILITATION | Facility: OTHER | Age: 51
End: 2021-07-27
Attending: PHYSICAL MEDICINE & REHABILITATION
Payer: COMMERCIAL

## 2021-08-03 ENCOUNTER — DOCUMENTATION ONLY (OUTPATIENT)
Dept: REHABILITATION | Facility: OTHER | Age: 51
End: 2021-08-03
Attending: PHYSICAL MEDICINE & REHABILITATION
Payer: COMMERCIAL

## 2021-08-10 ENCOUNTER — DOCUMENTATION ONLY (OUTPATIENT)
Dept: REHABILITATION | Facility: OTHER | Age: 51
End: 2021-08-10
Attending: PHYSICAL MEDICINE & REHABILITATION
Payer: COMMERCIAL

## 2021-08-11 ENCOUNTER — PROCEDURE VISIT (OUTPATIENT)
Dept: PHYSICAL MEDICINE AND REHAB | Facility: CLINIC | Age: 51
End: 2021-08-11
Payer: COMMERCIAL

## 2021-08-11 DIAGNOSIS — M25.512 LEFT SHOULDER PAIN, UNSPECIFIED CHRONICITY: ICD-10-CM

## 2021-08-11 PROCEDURE — 95886 PR EMG COMPLETE, W/ NERVE CONDUCTION STUDIES, 5+ MUSCLES: ICD-10-PCS | Mod: S$GLB,,, | Performed by: PHYSICAL MEDICINE & REHABILITATION

## 2021-08-11 PROCEDURE — 95912 PR NERVE CONDUCTION STUDY; 11 -12 STUDIES: ICD-10-PCS | Mod: S$GLB,,, | Performed by: PHYSICAL MEDICINE & REHABILITATION

## 2021-08-11 PROCEDURE — 95912 NRV CNDJ TEST 11-12 STUDIES: CPT | Mod: S$GLB,,, | Performed by: PHYSICAL MEDICINE & REHABILITATION

## 2021-08-11 PROCEDURE — 95886 MUSC TEST DONE W/N TEST COMP: CPT | Mod: S$GLB,,, | Performed by: PHYSICAL MEDICINE & REHABILITATION

## 2021-08-17 ENCOUNTER — DOCUMENTATION ONLY (OUTPATIENT)
Dept: REHABILITATION | Facility: OTHER | Age: 51
End: 2021-08-17
Attending: PHYSICAL MEDICINE & REHABILITATION
Payer: COMMERCIAL

## 2021-08-24 ENCOUNTER — DOCUMENTATION ONLY (OUTPATIENT)
Dept: REHABILITATION | Facility: OTHER | Age: 51
End: 2021-08-24
Attending: PHYSICAL MEDICINE & REHABILITATION
Payer: COMMERCIAL

## 2021-08-31 ENCOUNTER — PATIENT OUTREACH (OUTPATIENT)
Dept: ADMINISTRATIVE | Facility: OTHER | Age: 51
End: 2021-08-31

## 2021-09-07 ENCOUNTER — TELEPHONE (OUTPATIENT)
Dept: REHABILITATION | Facility: OTHER | Age: 51
End: 2021-09-07

## 2021-09-20 ENCOUNTER — LAB VISIT (OUTPATIENT)
Dept: LAB | Facility: HOSPITAL | Age: 51
End: 2021-09-20
Attending: INTERNAL MEDICINE
Payer: COMMERCIAL

## 2021-09-20 ENCOUNTER — OFFICE VISIT (OUTPATIENT)
Dept: INTERNAL MEDICINE | Facility: CLINIC | Age: 51
End: 2021-09-20
Payer: COMMERCIAL

## 2021-09-20 ENCOUNTER — TELEPHONE (OUTPATIENT)
Dept: INTERNAL MEDICINE | Facility: CLINIC | Age: 51
End: 2021-09-20

## 2021-09-20 VITALS
DIASTOLIC BLOOD PRESSURE: 70 MMHG | SYSTOLIC BLOOD PRESSURE: 122 MMHG | HEART RATE: 74 BPM | BODY MASS INDEX: 24.78 KG/M2 | WEIGHT: 163 LBS

## 2021-09-20 DIAGNOSIS — L98.9 SKIN LESION OF FOOT: ICD-10-CM

## 2021-09-20 DIAGNOSIS — E53.8 B12 DEFICIENCY: ICD-10-CM

## 2021-09-20 DIAGNOSIS — Z12.5 SCREENING FOR PROSTATE CANCER: ICD-10-CM

## 2021-09-20 DIAGNOSIS — Z00.00 ROUTINE PHYSICAL EXAMINATION: ICD-10-CM

## 2021-09-20 DIAGNOSIS — Z82.49 FAMILY HISTORY OF EARLY CAD: ICD-10-CM

## 2021-09-20 DIAGNOSIS — N50.82 PAIN IN SCROTUM: ICD-10-CM

## 2021-09-20 DIAGNOSIS — Z00.00 ROUTINE PHYSICAL EXAMINATION: Primary | ICD-10-CM

## 2021-09-20 DIAGNOSIS — Z82.49 FAMILY HISTORY OF ARRHYTHMIA: ICD-10-CM

## 2021-09-20 DIAGNOSIS — D17.22 LIPOMA OF LEFT UPPER EXTREMITY: ICD-10-CM

## 2021-09-20 LAB
ALBUMIN SERPL BCP-MCNC: 4 G/DL (ref 3.5–5.2)
ALP SERPL-CCNC: 70 U/L (ref 55–135)
ALT SERPL W/O P-5'-P-CCNC: 18 U/L (ref 10–44)
ANION GAP SERPL CALC-SCNC: 11 MMOL/L (ref 8–16)
AST SERPL-CCNC: 16 U/L (ref 10–40)
BASOPHILS # BLD AUTO: 0.03 K/UL (ref 0–0.2)
BASOPHILS NFR BLD: 0.5 % (ref 0–1.9)
BILIRUB SERPL-MCNC: 0.8 MG/DL (ref 0.1–1)
BUN SERPL-MCNC: 10 MG/DL (ref 6–20)
CALCIUM SERPL-MCNC: 9.3 MG/DL (ref 8.7–10.5)
CHLORIDE SERPL-SCNC: 110 MMOL/L (ref 95–110)
CHOLEST SERPL-MCNC: 181 MG/DL (ref 120–199)
CHOLEST/HDLC SERPL: 3.5 {RATIO} (ref 2–5)
CO2 SERPL-SCNC: 22 MMOL/L (ref 23–29)
COMPLEXED PSA SERPL-MCNC: 0.4 NG/ML (ref 0–4)
CREAT SERPL-MCNC: 1.1 MG/DL (ref 0.5–1.4)
DIFFERENTIAL METHOD: ABNORMAL
EOSINOPHIL # BLD AUTO: 0.1 K/UL (ref 0–0.5)
EOSINOPHIL NFR BLD: 2.3 % (ref 0–8)
ERYTHROCYTE [DISTWIDTH] IN BLOOD BY AUTOMATED COUNT: 12.6 % (ref 11.5–14.5)
EST. GFR  (AFRICAN AMERICAN): >60 ML/MIN/1.73 M^2
EST. GFR  (NON AFRICAN AMERICAN): >60 ML/MIN/1.73 M^2
GLUCOSE SERPL-MCNC: 103 MG/DL (ref 70–110)
HCT VFR BLD AUTO: 45.8 % (ref 40–54)
HDLC SERPL-MCNC: 52 MG/DL (ref 40–75)
HDLC SERPL: 28.7 % (ref 20–50)
HGB BLD-MCNC: 15.5 G/DL (ref 14–18)
IMM GRANULOCYTES # BLD AUTO: 0.01 K/UL (ref 0–0.04)
IMM GRANULOCYTES NFR BLD AUTO: 0.2 % (ref 0–0.5)
LDLC SERPL CALC-MCNC: 116.4 MG/DL (ref 63–159)
LYMPHOCYTES # BLD AUTO: 1.4 K/UL (ref 1–4.8)
LYMPHOCYTES NFR BLD: 22.5 % (ref 18–48)
MCH RBC QN AUTO: 31.5 PG (ref 27–31)
MCHC RBC AUTO-ENTMCNC: 33.8 G/DL (ref 32–36)
MCV RBC AUTO: 93 FL (ref 82–98)
MONOCYTES # BLD AUTO: 0.4 K/UL (ref 0.3–1)
MONOCYTES NFR BLD: 6.2 % (ref 4–15)
NEUTROPHILS # BLD AUTO: 4.1 K/UL (ref 1.8–7.7)
NEUTROPHILS NFR BLD: 68.3 % (ref 38–73)
NONHDLC SERPL-MCNC: 129 MG/DL
NRBC BLD-RTO: 0 /100 WBC
PLATELET # BLD AUTO: 217 K/UL (ref 150–450)
PMV BLD AUTO: 10.3 FL (ref 9.2–12.9)
POTASSIUM SERPL-SCNC: 4 MMOL/L (ref 3.5–5.1)
PROT SERPL-MCNC: 6.8 G/DL (ref 6–8.4)
RBC # BLD AUTO: 4.92 M/UL (ref 4.6–6.2)
SODIUM SERPL-SCNC: 143 MMOL/L (ref 136–145)
TRIGL SERPL-MCNC: 63 MG/DL (ref 30–150)
TSH SERPL DL<=0.005 MIU/L-ACNC: 2.36 UIU/ML (ref 0.4–4)
VIT B12 SERPL-MCNC: 1580 PG/ML (ref 210–950)
WBC # BLD AUTO: 5.99 K/UL (ref 3.9–12.7)

## 2021-09-20 PROCEDURE — 3074F SYST BP LT 130 MM HG: CPT | Mod: CPTII,S$GLB,, | Performed by: INTERNAL MEDICINE

## 2021-09-20 PROCEDURE — 3078F DIAST BP <80 MM HG: CPT | Mod: CPTII,S$GLB,, | Performed by: INTERNAL MEDICINE

## 2021-09-20 PROCEDURE — 80061 LIPID PANEL: CPT | Performed by: INTERNAL MEDICINE

## 2021-09-20 PROCEDURE — 99999 PR PBB SHADOW E&M-EST. PATIENT-LVL III: CPT | Mod: PBBFAC,,, | Performed by: INTERNAL MEDICINE

## 2021-09-20 PROCEDURE — 1160F RVW MEDS BY RX/DR IN RCRD: CPT | Mod: CPTII,S$GLB,, | Performed by: INTERNAL MEDICINE

## 2021-09-20 PROCEDURE — 82607 VITAMIN B-12: CPT | Performed by: INTERNAL MEDICINE

## 2021-09-20 PROCEDURE — 85025 COMPLETE CBC W/AUTO DIFF WBC: CPT | Performed by: INTERNAL MEDICINE

## 2021-09-20 PROCEDURE — 80053 COMPREHEN METABOLIC PANEL: CPT | Performed by: INTERNAL MEDICINE

## 2021-09-20 PROCEDURE — 84443 ASSAY THYROID STIM HORMONE: CPT | Performed by: INTERNAL MEDICINE

## 2021-09-20 PROCEDURE — 3008F BODY MASS INDEX DOCD: CPT | Mod: CPTII,S$GLB,, | Performed by: INTERNAL MEDICINE

## 2021-09-20 PROCEDURE — 1159F PR MEDICATION LIST DOCUMENTED IN MEDICAL RECORD: ICD-10-PCS | Mod: CPTII,S$GLB,, | Performed by: INTERNAL MEDICINE

## 2021-09-20 PROCEDURE — 99999 PR PBB SHADOW E&M-EST. PATIENT-LVL III: ICD-10-PCS | Mod: PBBFAC,,, | Performed by: INTERNAL MEDICINE

## 2021-09-20 PROCEDURE — 84153 ASSAY OF PSA TOTAL: CPT | Performed by: INTERNAL MEDICINE

## 2021-09-20 PROCEDURE — 99396 PREV VISIT EST AGE 40-64: CPT | Mod: S$GLB,,, | Performed by: INTERNAL MEDICINE

## 2021-09-20 PROCEDURE — 1159F MED LIST DOCD IN RCRD: CPT | Mod: CPTII,S$GLB,, | Performed by: INTERNAL MEDICINE

## 2021-09-20 PROCEDURE — 3074F PR MOST RECENT SYSTOLIC BLOOD PRESSURE < 130 MM HG: ICD-10-PCS | Mod: CPTII,S$GLB,, | Performed by: INTERNAL MEDICINE

## 2021-09-20 PROCEDURE — 1160F PR REVIEW ALL MEDS BY PRESCRIBER/CLIN PHARMACIST DOCUMENTED: ICD-10-PCS | Mod: CPTII,S$GLB,, | Performed by: INTERNAL MEDICINE

## 2021-09-20 PROCEDURE — 3008F PR BODY MASS INDEX (BMI) DOCUMENTED: ICD-10-PCS | Mod: CPTII,S$GLB,, | Performed by: INTERNAL MEDICINE

## 2021-09-20 PROCEDURE — 3078F PR MOST RECENT DIASTOLIC BLOOD PRESSURE < 80 MM HG: ICD-10-PCS | Mod: CPTII,S$GLB,, | Performed by: INTERNAL MEDICINE

## 2021-09-20 PROCEDURE — 36415 COLL VENOUS BLD VENIPUNCTURE: CPT | Performed by: INTERNAL MEDICINE

## 2021-09-20 PROCEDURE — 99396 PR PREVENTIVE VISIT,EST,40-64: ICD-10-PCS | Mod: S$GLB,,, | Performed by: INTERNAL MEDICINE

## 2021-09-21 ENCOUNTER — DOCUMENTATION ONLY (OUTPATIENT)
Dept: REHABILITATION | Facility: OTHER | Age: 51
End: 2021-09-21
Attending: PHYSICAL MEDICINE & REHABILITATION
Payer: COMMERCIAL

## 2021-09-28 ENCOUNTER — DOCUMENTATION ONLY (OUTPATIENT)
Dept: REHABILITATION | Facility: OTHER | Age: 51
End: 2021-09-28
Attending: PHYSICAL MEDICINE & REHABILITATION
Payer: COMMERCIAL

## 2021-10-01 RX ORDER — LEVOTHYROXINE SODIUM 75 UG/1
TABLET ORAL
Qty: 90 TABLET | Refills: 3 | Status: SHIPPED | OUTPATIENT
Start: 2021-10-01 | End: 2022-08-09 | Stop reason: SDUPTHER

## 2021-10-05 ENCOUNTER — DOCUMENTATION ONLY (OUTPATIENT)
Dept: REHABILITATION | Facility: OTHER | Age: 51
End: 2021-10-05
Attending: PHYSICAL MEDICINE & REHABILITATION
Payer: COMMERCIAL

## 2021-10-11 PROBLEM — Z82.49 FAMILY HISTORY OF ARRHYTHMIA: Status: ACTIVE | Noted: 2021-10-11

## 2021-10-12 ENCOUNTER — DOCUMENTATION ONLY (OUTPATIENT)
Dept: REHABILITATION | Facility: OTHER | Age: 51
End: 2021-10-12
Attending: PHYSICAL MEDICINE & REHABILITATION
Payer: COMMERCIAL

## 2021-10-13 ENCOUNTER — IMMUNIZATION (OUTPATIENT)
Dept: INTERNAL MEDICINE | Facility: CLINIC | Age: 51
End: 2021-10-13
Payer: COMMERCIAL

## 2021-10-13 ENCOUNTER — LAB VISIT (OUTPATIENT)
Dept: LAB | Facility: HOSPITAL | Age: 51
End: 2021-10-13
Attending: INTERNAL MEDICINE
Payer: COMMERCIAL

## 2021-10-13 ENCOUNTER — OFFICE VISIT (OUTPATIENT)
Dept: CARDIOLOGY | Facility: CLINIC | Age: 51
End: 2021-10-13
Payer: COMMERCIAL

## 2021-10-13 ENCOUNTER — HOSPITAL ENCOUNTER (OUTPATIENT)
Dept: CARDIOLOGY | Facility: CLINIC | Age: 51
Discharge: HOME | End: 2021-10-13
Payer: COMMERCIAL

## 2021-10-13 VITALS
BODY MASS INDEX: 25.2 KG/M2 | HEIGHT: 68 IN | OXYGEN SATURATION: 98 % | HEART RATE: 54 BPM | WEIGHT: 166.25 LBS | DIASTOLIC BLOOD PRESSURE: 60 MMHG | SYSTOLIC BLOOD PRESSURE: 118 MMHG

## 2021-10-13 DIAGNOSIS — Z82.49 FAMILY HISTORY OF ARRHYTHMIA: ICD-10-CM

## 2021-10-13 DIAGNOSIS — Z82.49 FAMILY HISTORY OF ARRHYTHMIA: Primary | ICD-10-CM

## 2021-10-13 DIAGNOSIS — Z13.6 ENCOUNTER FOR SCREENING FOR CARDIOVASCULAR DISORDERS: ICD-10-CM

## 2021-10-13 DIAGNOSIS — Z82.49 FAMILY HISTORY OF EARLY CAD: ICD-10-CM

## 2021-10-13 DIAGNOSIS — M15.9 PRIMARY OSTEOARTHRITIS INVOLVING MULTIPLE JOINTS: ICD-10-CM

## 2021-10-13 DIAGNOSIS — E03.4 HYPOTHYROIDISM DUE TO ACQUIRED ATROPHY OF THYROID: ICD-10-CM

## 2021-10-13 DIAGNOSIS — Z23 NEED FOR VACCINATION: Primary | ICD-10-CM

## 2021-10-13 PROBLEM — I10 HTN (HYPERTENSION), BENIGN: Status: ACTIVE | Noted: 2021-10-13

## 2021-10-13 LAB — CRP SERPL-MCNC: 0.47 MG/L (ref 0–3.19)

## 2021-10-13 PROCEDURE — 86141 C-REACTIVE PROTEIN HS: CPT | Performed by: INTERNAL MEDICINE

## 2021-10-13 PROCEDURE — 3078F DIAST BP <80 MM HG: CPT | Mod: CPTII,S$GLB,, | Performed by: INTERNAL MEDICINE

## 2021-10-13 PROCEDURE — 3008F BODY MASS INDEX DOCD: CPT | Mod: CPTII,S$GLB,, | Performed by: INTERNAL MEDICINE

## 2021-10-13 PROCEDURE — 93010 ELECTROCARDIOGRAM REPORT: CPT | Mod: S$GLB,,, | Performed by: INTERNAL MEDICINE

## 2021-10-13 PROCEDURE — 83695 ASSAY OF LIPOPROTEIN(A): CPT | Performed by: INTERNAL MEDICINE

## 2021-10-13 PROCEDURE — 1160F RVW MEDS BY RX/DR IN RCRD: CPT | Mod: CPTII,S$GLB,, | Performed by: INTERNAL MEDICINE

## 2021-10-13 PROCEDURE — 99204 PR OFFICE/OUTPT VISIT, NEW, LEVL IV, 45-59 MIN: ICD-10-PCS | Mod: S$GLB,,, | Performed by: INTERNAL MEDICINE

## 2021-10-13 PROCEDURE — 3078F PR MOST RECENT DIASTOLIC BLOOD PRESSURE < 80 MM HG: ICD-10-PCS | Mod: CPTII,S$GLB,, | Performed by: INTERNAL MEDICINE

## 2021-10-13 PROCEDURE — 1160F PR REVIEW ALL MEDS BY PRESCRIBER/CLIN PHARMACIST DOCUMENTED: ICD-10-PCS | Mod: CPTII,S$GLB,, | Performed by: INTERNAL MEDICINE

## 2021-10-13 PROCEDURE — 99204 OFFICE O/P NEW MOD 45 MIN: CPT | Mod: S$GLB,,, | Performed by: INTERNAL MEDICINE

## 2021-10-13 PROCEDURE — 91300 COVID-19, MRNA, LNP-S, PF, 30 MCG/0.3 ML DOSE VACCINE: CPT | Mod: PBBFAC | Performed by: INTERNAL MEDICINE

## 2021-10-13 PROCEDURE — 93010 EKG 12-LEAD: ICD-10-PCS | Mod: S$GLB,,, | Performed by: INTERNAL MEDICINE

## 2021-10-13 PROCEDURE — 0003A COVID-19, MRNA, LNP-S, PF, 30 MCG/0.3 ML DOSE VACCINE: CPT | Mod: CV19,PBBFAC | Performed by: INTERNAL MEDICINE

## 2021-10-13 PROCEDURE — 93005 ELECTROCARDIOGRAM TRACING: CPT | Mod: S$GLB,,, | Performed by: INTERNAL MEDICINE

## 2021-10-13 PROCEDURE — 1159F MED LIST DOCD IN RCRD: CPT | Mod: CPTII,S$GLB,, | Performed by: INTERNAL MEDICINE

## 2021-10-13 PROCEDURE — 99999 PR PBB SHADOW E&M-EST. PATIENT-LVL IV: ICD-10-PCS | Mod: PBBFAC,,, | Performed by: INTERNAL MEDICINE

## 2021-10-13 PROCEDURE — 3008F PR BODY MASS INDEX (BMI) DOCUMENTED: ICD-10-PCS | Mod: CPTII,S$GLB,, | Performed by: INTERNAL MEDICINE

## 2021-10-13 PROCEDURE — 93005 EKG 12-LEAD: ICD-10-PCS | Mod: S$GLB,,, | Performed by: INTERNAL MEDICINE

## 2021-10-13 PROCEDURE — 1159F PR MEDICATION LIST DOCUMENTED IN MEDICAL RECORD: ICD-10-PCS | Mod: CPTII,S$GLB,, | Performed by: INTERNAL MEDICINE

## 2021-10-13 PROCEDURE — 3074F SYST BP LT 130 MM HG: CPT | Mod: CPTII,S$GLB,, | Performed by: INTERNAL MEDICINE

## 2021-10-13 PROCEDURE — 99999 PR PBB SHADOW E&M-EST. PATIENT-LVL IV: CPT | Mod: PBBFAC,,, | Performed by: INTERNAL MEDICINE

## 2021-10-13 PROCEDURE — 36415 COLL VENOUS BLD VENIPUNCTURE: CPT | Performed by: INTERNAL MEDICINE

## 2021-10-13 PROCEDURE — 3074F PR MOST RECENT SYSTOLIC BLOOD PRESSURE < 130 MM HG: ICD-10-PCS | Mod: CPTII,S$GLB,, | Performed by: INTERNAL MEDICINE

## 2021-10-14 ENCOUNTER — HOSPITAL ENCOUNTER (OUTPATIENT)
Dept: RADIOLOGY | Facility: OTHER | Age: 51
Discharge: HOME | End: 2021-10-14
Attending: UROLOGY
Payer: COMMERCIAL

## 2021-10-14 ENCOUNTER — HOSPITAL ENCOUNTER (OUTPATIENT)
Dept: RADIOLOGY | Facility: HOSPITAL | Age: 51
Discharge: HOME | End: 2021-10-14
Attending: INTERNAL MEDICINE
Payer: COMMERCIAL

## 2021-10-14 ENCOUNTER — OFFICE VISIT (OUTPATIENT)
Dept: UROLOGY | Facility: CLINIC | Age: 51
End: 2021-10-14
Payer: COMMERCIAL

## 2021-10-14 VITALS
WEIGHT: 160.69 LBS | HEART RATE: 64 BPM | SYSTOLIC BLOOD PRESSURE: 118 MMHG | HEIGHT: 68 IN | BODY MASS INDEX: 24.35 KG/M2 | DIASTOLIC BLOOD PRESSURE: 77 MMHG

## 2021-10-14 DIAGNOSIS — Z13.6 ENCOUNTER FOR SCREENING FOR CARDIOVASCULAR DISORDERS: ICD-10-CM

## 2021-10-14 DIAGNOSIS — Z82.49 FAMILY HISTORY OF ARRHYTHMIA: ICD-10-CM

## 2021-10-14 DIAGNOSIS — N50.82 SCROTAL PAIN: Primary | ICD-10-CM

## 2021-10-14 DIAGNOSIS — N50.82 PAIN IN SCROTUM: ICD-10-CM

## 2021-10-14 DIAGNOSIS — Z82.49 FAMILY HISTORY OF EARLY CAD: ICD-10-CM

## 2021-10-14 PROCEDURE — 99999 PR PBB SHADOW E&M-EST. PATIENT-LVL III: ICD-10-PCS | Mod: PBBFAC,,, | Performed by: UROLOGY

## 2021-10-14 PROCEDURE — 99999 PR PBB SHADOW E&M-EST. PATIENT-LVL III: CPT | Mod: PBBFAC,,, | Performed by: UROLOGY

## 2021-10-14 PROCEDURE — 75571 CT HRT W/O DYE W/CA TEST: CPT | Mod: 26,,, | Performed by: RADIOLOGY

## 2021-10-14 PROCEDURE — 76870 US EXAM SCROTUM: CPT | Mod: 26,,, | Performed by: RADIOLOGY

## 2021-10-14 PROCEDURE — 3074F SYST BP LT 130 MM HG: CPT | Mod: CPTII,S$GLB,, | Performed by: UROLOGY

## 2021-10-14 PROCEDURE — 3078F PR MOST RECENT DIASTOLIC BLOOD PRESSURE < 80 MM HG: ICD-10-PCS | Mod: CPTII,S$GLB,, | Performed by: UROLOGY

## 2021-10-14 PROCEDURE — 76870 US SCROTUM AND TESTICLES: ICD-10-PCS | Mod: 26,,, | Performed by: RADIOLOGY

## 2021-10-14 PROCEDURE — 3074F PR MOST RECENT SYSTOLIC BLOOD PRESSURE < 130 MM HG: ICD-10-PCS | Mod: CPTII,S$GLB,, | Performed by: UROLOGY

## 2021-10-14 PROCEDURE — 1160F PR REVIEW ALL MEDS BY PRESCRIBER/CLIN PHARMACIST DOCUMENTED: ICD-10-PCS | Mod: CPTII,S$GLB,, | Performed by: UROLOGY

## 2021-10-14 PROCEDURE — 75571 CT CALCIUM SCORING CARDIAC: ICD-10-PCS | Mod: 26,,, | Performed by: RADIOLOGY

## 2021-10-14 PROCEDURE — 3078F DIAST BP <80 MM HG: CPT | Mod: CPTII,S$GLB,, | Performed by: UROLOGY

## 2021-10-14 PROCEDURE — 99204 PR OFFICE/OUTPT VISIT, NEW, LEVL IV, 45-59 MIN: ICD-10-PCS | Mod: S$GLB,,, | Performed by: UROLOGY

## 2021-10-14 PROCEDURE — 1160F RVW MEDS BY RX/DR IN RCRD: CPT | Mod: CPTII,S$GLB,, | Performed by: UROLOGY

## 2021-10-14 PROCEDURE — 1159F PR MEDICATION LIST DOCUMENTED IN MEDICAL RECORD: ICD-10-PCS | Mod: CPTII,S$GLB,, | Performed by: UROLOGY

## 2021-10-14 PROCEDURE — 3008F BODY MASS INDEX DOCD: CPT | Mod: CPTII,S$GLB,, | Performed by: UROLOGY

## 2021-10-14 PROCEDURE — 76870 US EXAM SCROTUM: CPT | Mod: TC

## 2021-10-14 PROCEDURE — 99204 OFFICE O/P NEW MOD 45 MIN: CPT | Mod: S$GLB,,, | Performed by: UROLOGY

## 2021-10-14 PROCEDURE — 75571 CT HRT W/O DYE W/CA TEST: CPT | Mod: TC

## 2021-10-14 PROCEDURE — 3008F PR BODY MASS INDEX (BMI) DOCUMENTED: ICD-10-PCS | Mod: CPTII,S$GLB,, | Performed by: UROLOGY

## 2021-10-14 PROCEDURE — 1159F MED LIST DOCD IN RCRD: CPT | Mod: CPTII,S$GLB,, | Performed by: UROLOGY

## 2021-10-15 LAB — LPA SERPL-MCNC: <5 MG/DL (ref 0–30)

## 2021-10-19 ENCOUNTER — DOCUMENTATION ONLY (OUTPATIENT)
Dept: REHABILITATION | Facility: OTHER | Age: 51
End: 2021-10-19
Attending: PHYSICAL MEDICINE & REHABILITATION
Payer: COMMERCIAL

## 2021-10-25 ENCOUNTER — PATIENT OUTREACH (OUTPATIENT)
Dept: ADMINISTRATIVE | Facility: OTHER | Age: 51
End: 2021-10-25
Payer: COMMERCIAL

## 2021-10-26 ENCOUNTER — DOCUMENTATION ONLY (OUTPATIENT)
Dept: REHABILITATION | Facility: OTHER | Age: 51
End: 2021-10-26
Attending: PHYSICAL MEDICINE & REHABILITATION
Payer: COMMERCIAL

## 2021-10-26 ENCOUNTER — OFFICE VISIT (OUTPATIENT)
Dept: SPORTS MEDICINE | Facility: CLINIC | Age: 51
End: 2021-10-26
Payer: COMMERCIAL

## 2021-10-26 VITALS
DIASTOLIC BLOOD PRESSURE: 80 MMHG | HEIGHT: 68 IN | SYSTOLIC BLOOD PRESSURE: 120 MMHG | WEIGHT: 160 LBS | HEART RATE: 52 BPM | BODY MASS INDEX: 24.25 KG/M2

## 2021-10-26 DIAGNOSIS — M25.512 LEFT SHOULDER PAIN, UNSPECIFIED CHRONICITY: Primary | ICD-10-CM

## 2021-10-26 DIAGNOSIS — M54.2 CERVICALGIA: ICD-10-CM

## 2021-10-26 PROCEDURE — 3074F SYST BP LT 130 MM HG: CPT | Mod: CPTII,S$GLB,, | Performed by: ORTHOPAEDIC SURGERY

## 2021-10-26 PROCEDURE — 3008F PR BODY MASS INDEX (BMI) DOCUMENTED: ICD-10-PCS | Mod: CPTII,S$GLB,, | Performed by: ORTHOPAEDIC SURGERY

## 2021-10-26 PROCEDURE — 3079F DIAST BP 80-89 MM HG: CPT | Mod: CPTII,S$GLB,, | Performed by: ORTHOPAEDIC SURGERY

## 2021-10-26 PROCEDURE — 3074F PR MOST RECENT SYSTOLIC BLOOD PRESSURE < 130 MM HG: ICD-10-PCS | Mod: CPTII,S$GLB,, | Performed by: ORTHOPAEDIC SURGERY

## 2021-10-26 PROCEDURE — 3079F PR MOST RECENT DIASTOLIC BLOOD PRESSURE 80-89 MM HG: ICD-10-PCS | Mod: CPTII,S$GLB,, | Performed by: ORTHOPAEDIC SURGERY

## 2021-10-26 PROCEDURE — 99999 PR PBB SHADOW E&M-EST. PATIENT-LVL II: ICD-10-PCS | Mod: PBBFAC,,, | Performed by: ORTHOPAEDIC SURGERY

## 2021-10-26 PROCEDURE — 99214 PR OFFICE/OUTPT VISIT, EST, LEVL IV, 30-39 MIN: ICD-10-PCS | Mod: S$GLB,,, | Performed by: ORTHOPAEDIC SURGERY

## 2021-10-26 PROCEDURE — 99214 OFFICE O/P EST MOD 30 MIN: CPT | Mod: S$GLB,,, | Performed by: ORTHOPAEDIC SURGERY

## 2021-10-26 PROCEDURE — 3008F BODY MASS INDEX DOCD: CPT | Mod: CPTII,S$GLB,, | Performed by: ORTHOPAEDIC SURGERY

## 2021-10-26 PROCEDURE — 99999 PR PBB SHADOW E&M-EST. PATIENT-LVL II: CPT | Mod: PBBFAC,,, | Performed by: ORTHOPAEDIC SURGERY

## 2021-11-02 ENCOUNTER — DOCUMENTATION ONLY (OUTPATIENT)
Dept: REHABILITATION | Facility: OTHER | Age: 51
End: 2021-11-02
Attending: PHYSICAL MEDICINE & REHABILITATION
Payer: COMMERCIAL

## 2021-11-04 ENCOUNTER — OFFICE VISIT (OUTPATIENT)
Dept: DERMATOLOGY | Facility: CLINIC | Age: 51
End: 2021-11-04
Payer: COMMERCIAL

## 2021-11-04 DIAGNOSIS — D18.01 CHERRY ANGIOMA: ICD-10-CM

## 2021-11-04 DIAGNOSIS — M67.479 MUCOUS CYST OF TOE: ICD-10-CM

## 2021-11-04 DIAGNOSIS — Z86.19 HISTORY OF INFECTION DUE TO HUMAN PAPILLOMA VIRUS (HPV): ICD-10-CM

## 2021-11-04 DIAGNOSIS — L81.4 LENTIGO: ICD-10-CM

## 2021-11-04 DIAGNOSIS — L98.9 SKIN LESION OF FOOT: ICD-10-CM

## 2021-11-04 DIAGNOSIS — D22.9 MULTIPLE BENIGN NEVI: Primary | ICD-10-CM

## 2021-11-04 PROCEDURE — 1159F PR MEDICATION LIST DOCUMENTED IN MEDICAL RECORD: ICD-10-PCS | Mod: CPTII,S$GLB,, | Performed by: DERMATOLOGY

## 2021-11-04 PROCEDURE — 1159F MED LIST DOCD IN RCRD: CPT | Mod: CPTII,S$GLB,, | Performed by: DERMATOLOGY

## 2021-11-04 PROCEDURE — 99999 PR PBB SHADOW E&M-EST. PATIENT-LVL II: ICD-10-PCS | Mod: PBBFAC,,, | Performed by: DERMATOLOGY

## 2021-11-04 PROCEDURE — 99203 OFFICE O/P NEW LOW 30 MIN: CPT | Mod: S$GLB,,, | Performed by: DERMATOLOGY

## 2021-11-04 PROCEDURE — 1160F RVW MEDS BY RX/DR IN RCRD: CPT | Mod: CPTII,S$GLB,, | Performed by: DERMATOLOGY

## 2021-11-04 PROCEDURE — 99203 PR OFFICE/OUTPT VISIT, NEW, LEVL III, 30-44 MIN: ICD-10-PCS | Mod: S$GLB,,, | Performed by: DERMATOLOGY

## 2021-11-04 PROCEDURE — 1160F PR REVIEW ALL MEDS BY PRESCRIBER/CLIN PHARMACIST DOCUMENTED: ICD-10-PCS | Mod: CPTII,S$GLB,, | Performed by: DERMATOLOGY

## 2021-11-04 PROCEDURE — 99999 PR PBB SHADOW E&M-EST. PATIENT-LVL II: CPT | Mod: PBBFAC,,, | Performed by: DERMATOLOGY

## 2021-11-09 ENCOUNTER — DOCUMENTATION ONLY (OUTPATIENT)
Dept: REHABILITATION | Facility: OTHER | Age: 51
End: 2021-11-09
Attending: PHYSICAL MEDICINE & REHABILITATION
Payer: COMMERCIAL

## 2021-11-16 ENCOUNTER — DOCUMENTATION ONLY (OUTPATIENT)
Dept: REHABILITATION | Facility: OTHER | Age: 51
End: 2021-11-16
Attending: PHYSICAL MEDICINE & REHABILITATION
Payer: COMMERCIAL

## 2021-11-23 ENCOUNTER — DOCUMENTATION ONLY (OUTPATIENT)
Dept: REHABILITATION | Facility: OTHER | Age: 51
End: 2021-11-23
Attending: PHYSICAL MEDICINE & REHABILITATION
Payer: COMMERCIAL

## 2021-11-30 ENCOUNTER — DOCUMENTATION ONLY (OUTPATIENT)
Dept: REHABILITATION | Facility: OTHER | Age: 51
End: 2021-11-30
Attending: PHYSICAL MEDICINE & REHABILITATION
Payer: COMMERCIAL

## 2021-12-02 ENCOUNTER — TELEPHONE (OUTPATIENT)
Dept: CARDIOLOGY | Facility: CLINIC | Age: 51
End: 2021-12-02
Payer: COMMERCIAL

## 2021-12-07 ENCOUNTER — DOCUMENTATION ONLY (OUTPATIENT)
Dept: REHABILITATION | Facility: OTHER | Age: 51
End: 2021-12-07
Attending: PHYSICAL MEDICINE & REHABILITATION
Payer: COMMERCIAL

## 2021-12-14 ENCOUNTER — DOCUMENTATION ONLY (OUTPATIENT)
Dept: REHABILITATION | Facility: OTHER | Age: 51
End: 2021-12-14
Attending: PHYSICAL MEDICINE & REHABILITATION
Payer: COMMERCIAL

## 2021-12-30 ENCOUNTER — DOCUMENTATION ONLY (OUTPATIENT)
Dept: REHABILITATION | Facility: OTHER | Age: 51
End: 2021-12-30
Attending: PHYSICAL MEDICINE & REHABILITATION
Payer: COMMERCIAL

## 2022-01-04 ENCOUNTER — DOCUMENTATION ONLY (OUTPATIENT)
Dept: REHABILITATION | Facility: OTHER | Age: 52
End: 2022-01-04
Attending: PHYSICAL MEDICINE & REHABILITATION
Payer: COMMERCIAL

## 2022-01-04 NOTE — PROGRESS NOTES
Health  Wellness Visit Note    Name: Medardo Vizcaino III  Clinic Number: [id number]  Physician: No ref. provider found  Diagnosis: No diagnosis found.  Past Medical History:   Diagnosis Date    Acromioclavicular joint arthritis 3/20/2014    Hypothyroidism      Visit Number: 53  Precautions: Standard- shoulder surgery      1st PT visit: 1/5/2021  Year of care end date: 1/5/2022  6 Month test  Performed: 8/3/2021  12 Month test performed: January 2022  Mind body plan: Employee Plan A 8 months  Patient level: A    Time In: 5:10 PM  Time Out: 5:54 PM  Total Treatment Time: 44  minutes    Wellness Vision 2020  Handout on this week's wellness topic Gratitude provided along with a discussion on what it means, the benefits, and suggestions for practice.  Reviewed last week's topic of Journal prioritization- he red over the handout.      Subjective:   Patient reports that his lower back feels great today; he is stretching daily and iced twice after riding his bike.  Medardo rode his bike twice, 18 miles and 12 miles.  He also hiked 4 times for 5.5 miles since his last visit.  Pt will be doing 12-month test next week, last visit.     Objective:   Medardo completed therapeutic stretches (EIL, ROSALIA) and the following MedX exercise machines: core lumbar, torso rotation l/r, leg extension, leg curl, upright row, chest press, biceps curl, triceps extension, leg press    See exercise log in patient folder for rate of exertion and repetitions completed.       Fitness Machine Education Key:  E=education on equipment initiated and further follow up and education needed  I=independent with  and exercise.  The patient:   Adjusts machines to his/her settings   Uses equipment levers, pins, weights safely   Maintains safe and correct posture while exercising   Moves through exercise with correct pace and control   Gets on and off equipment safely      Core lumbar strength I Core Torso Rotation I Leg Press I   Leg  Extension I Seated Leg Curl I Chest Press I   Row I Hip ADD X Abductor I   Triceps I Biceps I Other: X       Assessment:   Patient tolerated the Med X Core lumbar strength and all other peripheral exercises without an increase in symptoms.  Pt warmed up on the recumbent bike for 8 minutes, stretched, and iced lower back for 5 minutes at the end of the workout.      Plan:  Continue with established plan of care towards wellness goals.     Health  : Carissa Mcgill  1/4/2022

## 2022-01-11 ENCOUNTER — DOCUMENTATION ONLY (OUTPATIENT)
Dept: REHABILITATION | Facility: OTHER | Age: 52
End: 2022-01-11
Attending: PHYSICAL MEDICINE & REHABILITATION
Payer: COMMERCIAL

## 2022-01-11 NOTE — PROGRESS NOTES
Health  Wellness Visit Note    Name: Medardo Vizcaino III  Clinic Number: [id number]  Physician: No ref. provider found  Diagnosis: No diagnosis found.  Past Medical History:   Diagnosis Date    Acromioclavicular joint arthritis 3/20/2014    Hypothyroidism      Visit Number: 54  Visit # 365  Precautions: Standard- shoulder surgery      1st PT visit: 1/5/2021  Year of care end date: 1/5/2022  6 Month test  Performed: 8/3/2021  12 Month test performed: January 2022  Mind body plan: Employee Plan A 8 months  Patient level: A    Time In: 5:06 PM  Time Out: 6:05 PM  Total Treatment Time:  59 minutes    Wellness Vision 2020  Handout on this week's wellness topic Get outdoors provided along with a discussion on what it means, the benefits, and suggestions for practice.  Reviewed last week's topic of Gratitude- he read over the handout.     Subjective:   Patient reports that his lower back feels good today; he is stretching every day and iced twice after long bike rides.  Pt rode his bike twice for 18 miles and hiked 4 times for 5.5 miles each time.   gave patient a copy of his weight card and Med X test results.      Objective:   Medardo completed therapeutic stretches (EIL, ROSALIA) and the following MedX exercise machines: core lumbar, torso rotation l/r, leg extension, leg curl, upright row, chest press, biceps curl, triceps extension, leg press    See exercise log in patient folder for rate of exertion and repetitions completed.       Fitness Machine Education Key:  E=education on equipment initiated and further follow up and education needed  I=independent with  and exercise.  The patient:   Adjusts machines to his/her settings   Uses equipment levers, pins, weights safely   Maintains safe and correct posture while exercising   Moves through exercise with correct pace and control   Gets on and off equipment safely      Core lumbar strength I Core Torso Rotation I Leg Press I   Leg Extension I  Seated Leg Curl I Chest Press I   Row I Hip ADD X Abductor I   Triceps I Biceps I Other: X       Assessment:   Patient tolerated the Med X Core lumbar strength and all other peripheral exercises without an increase in symptoms.  Pt warmed up on the recumbent bike for 8 minutes, stretched, and iced lower back for 5 minutes at the end of the workout.      Plan:  Continue with established plan of care towards wellness goals.     Health  : Carissa Mcgill  1/11/2022

## 2022-01-26 ENCOUNTER — HOSPITAL ENCOUNTER (OUTPATIENT)
Dept: RADIOLOGY | Facility: HOSPITAL | Age: 52
Discharge: HOME | End: 2022-01-26
Attending: ORTHOPAEDIC SURGERY
Payer: COMMERCIAL

## 2022-01-26 ENCOUNTER — OFFICE VISIT (OUTPATIENT)
Dept: ORTHOPEDICS | Facility: CLINIC | Age: 52
End: 2022-01-26
Payer: COMMERCIAL

## 2022-01-26 VITALS — HEIGHT: 68 IN | WEIGHT: 160.06 LBS | BODY MASS INDEX: 24.26 KG/M2

## 2022-01-26 DIAGNOSIS — M50.30 DDD (DEGENERATIVE DISC DISEASE), CERVICAL: ICD-10-CM

## 2022-01-26 DIAGNOSIS — M54.12 CERVICAL RADICULOPATHY: Primary | ICD-10-CM

## 2022-01-26 PROCEDURE — 3008F PR BODY MASS INDEX (BMI) DOCUMENTED: ICD-10-PCS | Mod: CPTII,S$GLB,, | Performed by: ORTHOPAEDIC SURGERY

## 2022-01-26 PROCEDURE — 72050 X-RAY EXAM NECK SPINE 4/5VWS: CPT | Mod: 26,,, | Performed by: RADIOLOGY

## 2022-01-26 PROCEDURE — 1159F MED LIST DOCD IN RCRD: CPT | Mod: CPTII,S$GLB,, | Performed by: ORTHOPAEDIC SURGERY

## 2022-01-26 PROCEDURE — 99999 PR PBB SHADOW E&M-EST. PATIENT-LVL II: CPT | Mod: PBBFAC,,, | Performed by: ORTHOPAEDIC SURGERY

## 2022-01-26 PROCEDURE — 72050 X-RAY EXAM NECK SPINE 4/5VWS: CPT | Mod: TC

## 2022-01-26 PROCEDURE — 1159F PR MEDICATION LIST DOCUMENTED IN MEDICAL RECORD: ICD-10-PCS | Mod: CPTII,S$GLB,, | Performed by: ORTHOPAEDIC SURGERY

## 2022-01-26 PROCEDURE — 99213 OFFICE O/P EST LOW 20 MIN: CPT | Mod: S$GLB,,, | Performed by: ORTHOPAEDIC SURGERY

## 2022-01-26 PROCEDURE — 3008F BODY MASS INDEX DOCD: CPT | Mod: CPTII,S$GLB,, | Performed by: ORTHOPAEDIC SURGERY

## 2022-01-26 PROCEDURE — 99999 PR PBB SHADOW E&M-EST. PATIENT-LVL II: ICD-10-PCS | Mod: PBBFAC,,, | Performed by: ORTHOPAEDIC SURGERY

## 2022-01-26 PROCEDURE — 99213 PR OFFICE/OUTPT VISIT, EST, LEVL III, 20-29 MIN: ICD-10-PCS | Mod: S$GLB,,, | Performed by: ORTHOPAEDIC SURGERY

## 2022-01-26 PROCEDURE — 72050 XR CERVICAL SPINE AP LAT WITH FLEX EXTEN: ICD-10-PCS | Mod: 26,,, | Performed by: RADIOLOGY

## 2022-01-26 RX ORDER — AMOXICILLIN 500 MG
CAPSULE ORAL DAILY
COMMUNITY

## 2022-01-31 NOTE — PROGRESS NOTES
The patient returns for follow-up.  He has a history of left lower extremity radiculopathy, and now complains of left upper extremity paresthesias, especially radiating into the left ulnar hand.  His low back and left lower extremity pain has also been exacerbated recently.    On physical examination, he does have notable decreased sensation light touch over the left ulnar hand.  There is no focal motor deficits.    Today reviewed cervical spine radiographs demonstrate minimal spondylosis, an MRI demonstrates C5-6 central and bilateral foraminal stenosis.  An EMG from August of 2021 demonstrates left-sided C6/C7 radiculopathy.    Impression:  Left upper extremity radiculopathy, history of left lower extremity radiculopathy.    Plan:  I recommended a lumbar epidural steroid injection.  We will see how this goes.

## 2022-02-01 ENCOUNTER — TELEPHONE (OUTPATIENT)
Dept: PAIN MEDICINE | Facility: OTHER | Age: 52
End: 2022-02-01
Payer: COMMERCIAL

## 2022-02-01 DIAGNOSIS — M54.12 CERVICAL RADICULOPATHY: Primary | ICD-10-CM

## 2022-02-02 ENCOUNTER — TELEPHONE (OUTPATIENT)
Dept: PAIN MEDICINE | Facility: OTHER | Age: 52
End: 2022-02-02
Payer: COMMERCIAL

## 2022-02-02 DIAGNOSIS — M54.12 CERVICAL RADICULOPATHY: Primary | ICD-10-CM

## 2022-02-09 ENCOUNTER — HOSPITAL ENCOUNTER (OUTPATIENT)
Facility: OTHER | Age: 52
Discharge: HOME | End: 2022-02-09
Attending: ANESTHESIOLOGY | Admitting: ANESTHESIOLOGY
Payer: COMMERCIAL

## 2022-02-09 VITALS
HEART RATE: 50 BPM | OXYGEN SATURATION: 98 % | SYSTOLIC BLOOD PRESSURE: 109 MMHG | DIASTOLIC BLOOD PRESSURE: 60 MMHG | BODY MASS INDEX: 25.01 KG/M2 | WEIGHT: 165 LBS | RESPIRATION RATE: 16 BRPM | HEIGHT: 68 IN | TEMPERATURE: 99 F

## 2022-02-09 DIAGNOSIS — G89.29 CHRONIC PAIN: ICD-10-CM

## 2022-02-09 DIAGNOSIS — M51.34 DDD (DEGENERATIVE DISC DISEASE), THORACIC: Primary | ICD-10-CM

## 2022-02-09 PROCEDURE — 25000003 PHARM REV CODE 250: Performed by: ANESTHESIOLOGY

## 2022-02-09 PROCEDURE — 63600175 PHARM REV CODE 636 W HCPCS: Performed by: ANESTHESIOLOGY

## 2022-02-09 PROCEDURE — 62321 NJX INTERLAMINAR CRV/THRC: CPT | Performed by: ANESTHESIOLOGY

## 2022-02-09 PROCEDURE — 25000003 PHARM REV CODE 250: Performed by: STUDENT IN AN ORGANIZED HEALTH CARE EDUCATION/TRAINING PROGRAM

## 2022-02-09 PROCEDURE — 62321 PR INJ CERV/THORAC, W/GUIDANCE: ICD-10-PCS | Mod: ,,, | Performed by: ANESTHESIOLOGY

## 2022-02-09 PROCEDURE — 25500020 PHARM REV CODE 255: Performed by: ANESTHESIOLOGY

## 2022-02-09 PROCEDURE — 62321 NJX INTERLAMINAR CRV/THRC: CPT | Mod: ,,, | Performed by: ANESTHESIOLOGY

## 2022-02-09 RX ORDER — MIDAZOLAM HYDROCHLORIDE 1 MG/ML
INJECTION INTRAMUSCULAR; INTRAVENOUS
Status: DISCONTINUED | OUTPATIENT
Start: 2022-02-09 | End: 2022-02-09 | Stop reason: HOSPADM

## 2022-02-09 RX ORDER — SODIUM CHLORIDE 9 MG/ML
INJECTION, SOLUTION INTRAVENOUS CONTINUOUS
Status: DISCONTINUED | OUTPATIENT
Start: 2022-02-09 | End: 2022-02-09 | Stop reason: HOSPADM

## 2022-02-09 RX ORDER — DEXAMETHASONE SODIUM PHOSPHATE 10 MG/ML
INJECTION INTRAMUSCULAR; INTRAVENOUS
Status: DISCONTINUED | OUTPATIENT
Start: 2022-02-09 | End: 2022-02-09 | Stop reason: HOSPADM

## 2022-02-09 RX ORDER — LIDOCAINE HYDROCHLORIDE 10 MG/ML
INJECTION, SOLUTION EPIDURAL; INFILTRATION; INTRACAUDAL; PERINEURAL
Status: DISCONTINUED | OUTPATIENT
Start: 2022-02-09 | End: 2022-02-09 | Stop reason: HOSPADM

## 2022-02-09 RX ORDER — LIDOCAINE HYDROCHLORIDE 20 MG/ML
INJECTION, SOLUTION INFILTRATION; PERINEURAL
Status: DISCONTINUED | OUTPATIENT
Start: 2022-02-09 | End: 2022-02-09 | Stop reason: HOSPADM

## 2022-02-09 NOTE — DISCHARGE SUMMARY
Discharge Note  Short Stay      SUMMARY     Admit Date: 2/9/2022    Attending Physician: Rosalia Verduzco      Discharge Physician: Rosalia Verduzco      Discharge Date: 2/9/2022 2:48 PM    Procedure(s) (LRB):  Injection, Steroid, Epidural C7/T1  DIRECT REFERRAL (N/A)    Final Diagnosis: Cervical radiculopathy [M54.12]    Disposition: Home or self care    Patient Instructions:   Current Discharge Medication List      CONTINUE these medications which have NOT CHANGED    Details   levothyroxine (SYNTHROID) 75 MCG tablet Take 1 tablet by mouth once daily  Qty: 90 tablet, Refills: 3    Comments: Please inactivate all prior scripts with same name and strength including on holds.      omega-3 fatty acids/fish oil (FISH OIL-OMEGA-3 FATTY ACIDS) 300-1,000 mg capsule Take by mouth once daily.      triamterene-hydrochlorothiazide 75-50 mg (MAXZIDE) 75-50 mg per tablet Take 1 tablet by mouth once daily.  Qty: 90 tablet, Refills: 1                 Discharge Diagnosis: Cervical radiculopathy [M54.12]  Condition on Discharge: Stable with no complications to procedure   Diet on Discharge: Same as before.  Activity: as per instruction sheet.  Discharge to: Home with a responsible adult.  Follow up: 2-4 weeks       Please call my office or pager at [phone] if experienced any weakness or loss of sensation, fever > 101.5, pain uncontrolled with oral medications, persistent nausea/vomiting/or diarrhea, redness or drainage from the incisions, or any other worrisome concerns. If physician on call was not reached or could not communicate with our office for any reason please go to the nearest emergency department

## 2022-02-09 NOTE — H&P
HPI  Patient presenting for Procedure(s) (LRB):  Injection, Steroid, Epidural C7/T1  DIRECT REFERRAL (N/A)     Patient on Anti-coagulation No    No health changes since previous encounter    Past Medical History:   Diagnosis Date    Acromioclavicular joint arthritis 3/20/2014    Hypothyroidism      Past Surgical History:   Procedure Laterality Date    NASAL SEPTUM SURGERY      SHOULDER ARTHROSCOPY      SHOULDER SURGERY      TONSILLECTOMY      TUMOR EXCISION      arm; lipoma     Review of patient's allergies indicates:   Allergen Reactions    Sulfa (sulfonamide antibiotics) Hives      No current facility-administered medications for this encounter.       PMHx, PSHx, Allergies, Medications reviewed in epic    ROS negative except pain complaints in HPI    OBJECTIVE:    There were no vitals taken for this visit.    PHYSICAL EXAMINATION:    GENERAL: Well appearing, in no acute distress, alert and oriented x3.  PSYCH:  Mood and affect appropriate.  SKIN: Skin color, texture, turgor normal, no rashes or lesions which will impact the procedure.  CV: RRR with palpation of the radial artery.  PULM: No evidence of respiratory difficulty, symmetric chest rise. Clear to auscultation.  NEURO: Cranial nerves grossly intact.    Plan:    Proceed with procedure as planned Procedure(s) (LRB):  Injection, Steroid, Epidural C7/T1  DIRECT REFERRAL (N/A)    Rosalia Verduzco  02/09/2022

## 2022-02-09 NOTE — DISCHARGE INSTRUCTIONS

## 2022-02-09 NOTE — OP NOTE
Cervical Interlaminar Epidural Steroid Injection under Fluoroscopic Guidance    The procedure, risks, benefits, and options were discussed with the patient. There are no contraindications to the procedure. The patent expressed understanding and agreed to the procedure. Informed written consent was obtained prior to the start of the procedure and can be found in the patient's chart.     PATIENT NAME: Medardo Vizcaino III   MRN: [id number]     DATE OF PROCEDURE: 02/09/2022    PROCEDURE: Cervical Interlaminar Epidural Steroid Injection C7/T1 under Fluoroscopic Guidance    PRE-OP DIAGNOSIS: Cervical radiculopathy [M54.12]    POST-OP DIAGNOSIS: Same    PHYSICIAN: Deon Strauss MD     ASSISTANTS: Muddassir Sana, M.D. [facility] Fellow    MEDICATIONS INJECTED: Preservative-free Decadron 10mg with 1cc of Lidocaine 1% MPF and preservative free normal saline    LOCAL ANESTHETIC INJECTED: Xylocaine 2%     SEDATION:   None    ESTIMATED BLOOD LOSS: None    COMPLICATIONS: None    TECHNIQUE: Time-out was performed to identify the patient and procedure to be performed. With the patient laying in a prone position, the surgical area was prepped and draped in the usual sterile fashion using ChloraPrep and a fenestrated drape. The level was determined under fluoroscopy guidance. Skin anesthesia was achieved by injecting Lidocaine 2% over the injection site.  The interlaminar space was then approached with a 20 gauge, 3.5 inch Tuohy needle that was introduced under fluoroscopic guidance with AP, lateral and/or contralateral oblique imaging. Once the Ligamentum flavum was encountered loss of resistance to saline was used to enter the epidural space. With positive loss of resistance and negative aspiration for CSF or Blood, contrast dye Omnipaque (300mg/ml) was injected to confirm placement and there was no vascular runoff. Then 4 mL of the medication mixture listed above was then injected slowly. Displacement of the radio opaque  contrast after injection of the medication confirmed that the medication went into the area of the epidural space. The needles were removed, and bleeding was nil. A sterile dressing was applied. No specimens collected. The patient tolerated the procedure well.       The patient was monitored after the procedure in the recovery area. They were given post-procedure and discharge instructions to follow at home. The patient was discharged in a stable condition.    I reviewed and edited the fellow's note. I conducted my own interview and physical examination. I agree with the findings. I was present and supervising all critical portions of the procedure.  MD Rosalia Brady MD

## 2022-02-09 NOTE — PLAN OF CARE
Pt tolerated procedure well. Pt reports1 /10 pain after procedure. Assisted pt up for first time. Steady on feet. All discharge instructions given.

## 2022-03-29 ENCOUNTER — CLINICAL SUPPORT (OUTPATIENT)
Dept: REHABILITATION | Facility: HOSPITAL | Age: 52
End: 2022-03-29
Payer: COMMERCIAL

## 2022-03-29 DIAGNOSIS — G54.2 CERVICAL NERVE ROOT IMPINGEMENT: Primary | ICD-10-CM

## 2022-03-29 PROCEDURE — 97811 ACUP 1/> W/O ESTIM EA ADD 15: CPT | Performed by: ACUPUNCTURIST

## 2022-03-29 PROCEDURE — 97810 ACUP 1/> WO ESTIM 1ST 15 MIN: CPT | Performed by: ACUPUNCTURIST

## 2022-03-29 NOTE — PROGRESS NOTES
Acupuncture Evaluation Note     Name: Medardo Vizcaino III  Clinic Number: [id number]    Traditional Chinese Medicine (TCM) Diagnosis: Local Qi and Blood Stagnation in Governing Vessel  Medical Diagnosis: Pinched nerve at C5-C6   Referral Source:  Self-Referred    Date of Service: 3/29/2022     Evaluation Date: 3/29/2022    Plan of Care Certification Period: 12  Visit #/Visits authorized: 1/ 12    Precautions: Standard    Subjective     Chief Concern: Muscle fatigue and tension in left shoulder and radiating sensation to 4th and 5th finger occasionally.    Onset:  chronic                  Progression since onset:  has worsened    Aggravating Factors:  lying down and prolonged sitting   Relieving Factors:  Popping joint    Quality:  Aching, Dull and Tight    Severity:  7/10    Frequency:  continuously    Treatments Tried:  Therapy  and Chiropractic treatment    Diet/Taste/Fluids:  Tries to work off anything he chooses to eat with exercise.    Digestion:  WNL    Sleep: WNL    Exercise:  Hiking 5 miles per week and biking.     Sweating: WNL    Ears and Eyes: history of tinnitus/vertigo    Energy Levels:  WNL    Emotional Symptoms:  Everyday stress      Objective     Observation:  Kidney type     Treatment     Treatment:  Invigorate Qi and Blood Circulation through cervical spine, trapezius, supraspinatus, and pec major.     Acupuncture points used:  Bilateral:  Bl62/si3, bl60, kd4, gb20/21.  Gv14.     Auricular Treatment:  vo men, neck    NEEDLES IN: 13    NEEDLES OUT: 13    NEEDLES W/O STIM  AT: 11:30 AM    NEEDLES W/O STIM REMOVED AT: 12:00 PM    Total Billable Time: 30 minutes     Modalities Cupping    Assessment     1. Patient felt good.     Cause of Condition: Pinched nerve between C5-C6.    Associated Risk Factors:  Poor posture    Traditional Chinese Medicine Diagnosis:  Local qi and blood stagnation in Governing Vessel and divergent channels.      Patient prognosis is Good.     Patient will continue to  benefit from acupuncture treatment to address the deficits listed in the problem list box on initial evaluation, provide patient family education and to maximize pt's level of independence in the home and community environment.     Patient's spiritual, cultural and educational needs considered and pt agreeable to plan of care and goals.     Anticipated barriers to treatment: potentially poor posture with biking and desk work.    Plan     Recommend weekly sessions per week for 12 sessions then re-assess.      Recommendations:  Avoid straining neck forward, stretch pectoris muscles.     Education:  Patient is aware that frequent treatment may result in cumulative effect.

## 2022-04-08 ENCOUNTER — CLINICAL SUPPORT (OUTPATIENT)
Dept: REHABILITATION | Facility: HOSPITAL | Age: 52
End: 2022-04-08
Payer: COMMERCIAL

## 2022-04-08 DIAGNOSIS — G54.2 CERVICAL NERVE ROOT IMPINGEMENT: Primary | ICD-10-CM

## 2022-04-08 PROCEDURE — 97814 ACUP 1/> W/ESTIM EA ADDL 15: CPT | Performed by: ACUPUNCTURIST

## 2022-04-08 PROCEDURE — 97813 ACUP 1/> W/ESTIM 1ST 15 MIN: CPT | Performed by: ACUPUNCTURIST

## 2022-04-08 NOTE — PROGRESS NOTES
Acupuncture Treatment Note     Name: Medardo Vizcaino III  Clinic Number: [id number]    Traditional Chinese Medicine Diagnosis: Local Qi and Blood Stagnation in Governing Vessel, Wind-Damp Bi Obstruction  Physician: Self, Aaareferral    Date of Service: 4/8/2022     Medical Diagnosis: Cervical Nerve Root Impingement  Evaluation Date: 3/29/2022  Plan of Care Certification Period: 12  Visit #/Visits authorized: 2/ 12    Precautions: Standard    Subjective     Chief Complaint:  Left Shoulder Pain. Limited ROM with overhead raise of left arm in the morning for 30 minutes    Current State: Achey muscles around neck and shoulder    Response to Previous Treatment:  Pain reduced to 4/10.  No tingling in fingers.    Quality of Symptoms (Better/Worse):  Better    Activity Level: Same.  Gets chiropractic care/massage once a month     Other Condition/Symptoms:  Left Hip radiating to foot    Objective      Treatment:  Increase qi and blood circulation to cervical spine, relax trapezius, supraspinatus, and levator scapulae.    Findings:  Skin feels moist    Acupuncture Points: GB43, BL66, SI3, gv14, cervical hjj +3, left:  si10-14, gb21.      NEEDLES W/ STIM  AT: 12:45 PM    NEEDLES W/ STIM REMOVED AT: 1:15 PM    Recommendations:  Exercises to prevent rounding of the shoulders.     Education:  Patient is aware of cumulative effect of acupuncture    #NEEDLES IN: 16    #NEEDLES OUT: 16      Assessment      Analysis of Treatment:  Patient felt good with full ROM of shoulder.     Pt prognosis is Good.     Patient will continue to benefit from acupuncture treatment to address the deficits listed in the problem list box on initial evaluation, provide patient family education and to maximize pt's level of independence in the home and community environment.     Patient's spiritual, cultural and educational needs considered and pt agreeable to plan of care and goals.     Anticipated barriers to treatment: none    Plan     Recommend  1       /week for 6  treatments and re-assess.

## 2022-04-22 ENCOUNTER — CLINICAL SUPPORT (OUTPATIENT)
Dept: REHABILITATION | Facility: HOSPITAL | Age: 52
End: 2022-04-22
Payer: COMMERCIAL

## 2022-04-22 DIAGNOSIS — G54.2 CERVICAL NERVE ROOT IMPINGEMENT: Primary | ICD-10-CM

## 2022-04-22 PROCEDURE — 97811 ACUP 1/> W/O ESTIM EA ADD 15: CPT | Performed by: ACUPUNCTURIST

## 2022-04-22 PROCEDURE — 97810 ACUP 1/> WO ESTIM 1ST 15 MIN: CPT | Performed by: ACUPUNCTURIST

## 2022-04-22 NOTE — PROGRESS NOTES
Acupuncture Treatment Note     Name: Medardo Vizcaino III  Clinic Number: [id number]    Traditional Chinese Medicine Diagnosis: Local Qi and Blood Stagnation in Governing Vessel, Wind-Damp Bi Obstruction  Physician: Self, Aaareferral    Date of Service: 4/22/2022     Medical Diagnosis: Cervical Nerve Root Impingement  Evaluation Date: 3/29/2022  Plan of Care Certification Period: 12  Visit #/Visits authorized: 3/ 12    Precautions: Standard    Subjective     Chief Complaint:  Left Shoulder Pain. Limited ROM with overhead raise of left arm in the morning for 30 minutes    Current State: Achey muscles around neck and shoulder    Response to Previous Treatment:  Pain reduced from 6/10 to 4/10 consistently. No tingling in fingers.    Quality of Symptoms (Better/Worse):  Better    Activity Level: Same.  Gets chiropractic care/massage once a month     Other Condition/Symptoms:  Left Hip radiating to foot    Objective      Treatment:  Increase qi and blood circulation to scalenes, scm, supraspinatus, and left shoulder joint.  .    Findings:  Skin feels moist    Acupuncture Points: Left:  Scalekenisha niru+5, tw16, li15/11/4, kinza mike, gb20/21.  Right:  Si3/bl62.  yintang    NEEDLES W/OUT  AT: 1:00 PM    NEEDLES W/OUT REMOVED AT: 1:30 PM    Recommendations:  Exercises to prevent rounding of the shoulders.     Education:  Patient is aware of cumulative effect of acupuncture    #NEEDLES IN: 15    #NEEDLES OUT: 15      Assessment      Analysis of Treatment:  Patient felt good with full ROM of shoulder.     Pt prognosis is Good.     Patient will continue to benefit from acupuncture treatment to address the deficits listed in the problem list box on initial evaluation, provide patient family education and to maximize pt's level of independence in the home and community environment.     Patient's spiritual, cultural and educational needs considered and pt agreeable to plan of care and goals.     Anticipated barriers to treatment:  none    Plan     Recommend  1      /week for 6  treatments and re-assess.

## 2022-05-06 ENCOUNTER — CLINICAL SUPPORT (OUTPATIENT)
Dept: REHABILITATION | Facility: HOSPITAL | Age: 52
End: 2022-05-06
Payer: COMMERCIAL

## 2022-05-06 DIAGNOSIS — M54.42 CHRONIC LEFT-SIDED LOW BACK PAIN WITH LEFT-SIDED SCIATICA: ICD-10-CM

## 2022-05-06 DIAGNOSIS — M25.512 CHRONIC LEFT SHOULDER PAIN: Primary | ICD-10-CM

## 2022-05-06 DIAGNOSIS — G89.29 CHRONIC LEFT SHOULDER PAIN: Primary | ICD-10-CM

## 2022-05-06 DIAGNOSIS — G89.29 CHRONIC LEFT-SIDED LOW BACK PAIN WITH LEFT-SIDED SCIATICA: ICD-10-CM

## 2022-05-06 PROCEDURE — 97811 ACUP 1/> W/O ESTIM EA ADD 15: CPT | Performed by: ACUPUNCTURIST

## 2022-05-06 PROCEDURE — 97810 ACUP 1/> WO ESTIM 1ST 15 MIN: CPT | Performed by: ACUPUNCTURIST

## 2022-05-06 NOTE — PROGRESS NOTES
Acupuncture Treatment Note     Name: Medardo Vizcaino III  Clinic Number: [id number]    Traditional Chinese Medicine Diagnosis: Local Qi and Blood Stagnation in Du Deepa.  Physician: Self, Aaareferral    Date of Service: 5/6/2022     Medical Diagnosis: Left Shoulder and back stiffness and sciatica  Evaluation Date: 3/29/2022  Plan of Care Certification Period: 12  Visit #/Visits authorized: 4/ 12    Precautions: Standard    Subjective     Chief Complaint:  Left Shoulder Limited ROM with overhead raise above 90 degrees and stiffness in the joint.  Left foot mild numbness on front of foot and lateral side for a few years since Covid began.  Stiffness in low back level with SI joint.     Current State: Had minimal episodes of tingling in fingers.  Pain level at 4/10 and not peaking higher as before.      Quality of Symptoms (Better/Worse):  Better    Activity Level:  Gets chiropractic care/massage once a month     Other Condition/Symptoms:  n/a    Objective      Treatment:  Increase qi and blood circulation to serratus anterior, lower trapezius, du deepa, piriformis.     Findings:  Skin feels moist    Acupuncture Points: Left:  Li15, li10, gb30 niru +4, gb31/38, bl40.  Right:  Si3/bl62, gv14.     NEEDLES W/OUT STIM  AT: 1:00 PM    NEEDLES W/OUT STIM REMOVED AT: 1:30 PM    Recommendations:  Strength training to prevent rounding of the shoulders.     Education:  Patient is aware of cumulative effect of acupuncture    #NEEDLES IN: 12    #NEEDLES OUT: 12      Assessment      Analysis of Treatment:  Patient felt good.     Pt prognosis is Good.     Patient will continue to benefit from acupuncture treatment to address the deficits listed in the problem list box on initial evaluation, provide patient family education and to maximize pt's level of independence in the home and community environment.     Patient's spiritual, cultural and educational needs considered and pt agreeable to plan of care and goals.     Anticipated barriers  to treatment: none    Plan     Recommend  1      /week for 6  treatments and re-assess.

## 2022-05-20 ENCOUNTER — CLINICAL SUPPORT (OUTPATIENT)
Dept: REHABILITATION | Facility: HOSPITAL | Age: 52
End: 2022-05-20
Payer: COMMERCIAL

## 2022-05-20 DIAGNOSIS — G89.29 CHRONIC LEFT-SIDED LOW BACK PAIN WITH LEFT-SIDED SCIATICA: ICD-10-CM

## 2022-05-20 DIAGNOSIS — M25.512 CHRONIC LEFT SHOULDER PAIN: Primary | ICD-10-CM

## 2022-05-20 DIAGNOSIS — M54.42 CHRONIC LEFT-SIDED LOW BACK PAIN WITH LEFT-SIDED SCIATICA: ICD-10-CM

## 2022-05-20 DIAGNOSIS — G89.29 CHRONIC LEFT SHOULDER PAIN: Primary | ICD-10-CM

## 2022-05-20 PROCEDURE — 97810 ACUP 1/> WO ESTIM 1ST 15 MIN: CPT | Performed by: ACUPUNCTURIST

## 2022-05-20 PROCEDURE — 97811 ACUP 1/> W/O ESTIM EA ADD 15: CPT | Performed by: ACUPUNCTURIST

## 2022-05-20 NOTE — PROGRESS NOTES
Acupuncture Treatment Note     Name: Medardo Vizcaino III  Clinic Number: [id number]    Traditional Chinese Medicine Diagnosis: Local Qi and Blood Stagnation in [place].  Physician: Self, Aaareferral    Date of Service: 5/20/2022     Medical Diagnosis: Left Shoulder and back stiffness and sciatica  Evaluation Date: 3/29/2022  Plan of Care Certification Period: 12  Visit #/Visits authorized: 5/ 12    Precautions: Standard    Subjective     Chief Complaint:  Left Shoulder Limited ROM with overhead raise above 90 degrees and stiffness in the joint.  Left foot joint paint and lateral side for a few years since Covid began.  Stiffness in low back level with SI joint.     Current State: Had minimal episodes of tingling in fingers.  Pain level at 4/10 and not peaking higher as before.      Quality of Symptoms (Better/Worse):  Better    Activity Level:  Gets chiropractic care/massage once a month     Other Condition/Symptoms:  n/a    Objective      Treatment:  Increase qi and blood circulation to serratus anterior, lower trapezius, si joint, piriformis.     Findings:  Arthritis and cyst near SI joint    Acupuncture Points: Left:  Gb20, gv14, gb21, si15.  Si11/10 estim, serratus anterior nriu +2 estim, si joint, glute medius niru +3, gb34/35.       NEEDLES W/ STIM  AT: 1:00 PM    NEEDLES W/ STIM REMOVED AT: 1:30 PM    Recommendations:  Strength training to prevent rounding of the shoulders.     Education:  Patient is aware of cumulative effect of acupuncture    #NEEDLES IN: 14    #NEEDLES OUT: 14      Assessment      Analysis of Treatment:  Patient felt good.     Pt prognosis is Good.     Patient will continue to benefit from acupuncture treatment to address the deficits listed in the problem list box on initial evaluation, provide patient family education and to maximize pt's level of independence in the home and community environment.     Patient's spiritual, cultural and educational needs considered and pt agreeable to plan  of care and goals.     Anticipated barriers to treatment: none    Plan     Recommend  1      /week for 12  treatments and re-assess.

## 2022-06-03 ENCOUNTER — CLINICAL SUPPORT (OUTPATIENT)
Dept: REHABILITATION | Facility: HOSPITAL | Age: 52
End: 2022-06-03
Payer: COMMERCIAL

## 2022-06-03 DIAGNOSIS — G89.29 CHRONIC LEFT SHOULDER PAIN: Primary | ICD-10-CM

## 2022-06-03 DIAGNOSIS — G89.29 CHRONIC LEFT-SIDED LOW BACK PAIN WITH LEFT-SIDED SCIATICA: ICD-10-CM

## 2022-06-03 DIAGNOSIS — M54.42 CHRONIC LEFT-SIDED LOW BACK PAIN WITH LEFT-SIDED SCIATICA: ICD-10-CM

## 2022-06-03 DIAGNOSIS — M25.512 CHRONIC LEFT SHOULDER PAIN: Primary | ICD-10-CM

## 2022-06-03 PROCEDURE — 97810 ACUP 1/> WO ESTIM 1ST 15 MIN: CPT

## 2022-06-03 PROCEDURE — 97811 ACUP 1/> W/O ESTIM EA ADD 15: CPT

## 2022-06-03 NOTE — PROGRESS NOTES
Acupuncture Treatment Note     Name: Medardo Vizcaino III  Clinic Number: [id number]    Traditional Chinese Medicine Diagnosis: Qi and Blood stagnated in Left Should and Left Lower Back and Left Leg  Physician: Self, Aaareferral    Date of Service: 6/3/2022     Medical Diagnosis: Pain in Left Shoulder, Left Lower Back and Left Leg  Evaluation Date: 3/29/2022  Plan of Care Certification Period: 12  Visit #/Visits authorized: 6 / 12     Precautions: Standard    Subjective     Chief Complaint:  Left Shoulder Pain and could not raise left arm. Left Lower Back Pain, and radiates to Left Leg.      Response to Previous Treatment:  Little improved    Quality of Symptoms (Better/Worse):  better    Other Condition/Symptoms: None    Objective      New Findings:  None    Treatment Principles:  Increasing Qi and Blood in Left Shoulder, Left Lower Back, and Left Leg    Acupuncture Points:   Left: GB 21, LI 15, LI 16, Shaila around left shoulder 2 +. UB 25, UB 26, Fab Se, Antoine Tuo Keke Ji 2+, GB 30, Shaila aroun GB 30 +2, GB 31, GB 33, UB 40.    NEEDLES W/O STIM  AT: 1:00 PM    NEEDLES W/O STIM REMOVED AT: 1:40 PM    #NEEDLES IN: 16    #NEEDLES OUT: 16    Other Traditional Chinese Medicine Modalities:  No    Recommendations:  PT    Education:  Patient is aware of cumulative effect of acupuncture      Assessment      Analysis of Treatment:  After this time treatment, the Pain in left shouldeer disappeared, the left arm could raise to 180 degree. The Pain in Left Lower Back and Left leg reduced dramatically.     Pt prognosis is Excellent.     Patient will continue to benefit from acupuncture treatment to address the deficits listed in the problem list box on initial evaluation, provide patient family education and to maximize pt's level of independence in the home and community environment.     Patient's spiritual, cultural and educational needs considered and pt agreeable to plan of care and goals.     Anticipated barriers to  treatment: None    Plan     Recommend     1   /week for 12  treatments and re-assess.

## 2022-06-20 ENCOUNTER — OFFICE VISIT (OUTPATIENT)
Dept: INTERNAL MEDICINE | Facility: CLINIC | Age: 52
End: 2022-06-20
Payer: COMMERCIAL

## 2022-06-20 VITALS
HEART RATE: 59 BPM | WEIGHT: 166.44 LBS | DIASTOLIC BLOOD PRESSURE: 70 MMHG | BODY MASS INDEX: 25.23 KG/M2 | HEIGHT: 68 IN | OXYGEN SATURATION: 98 % | SYSTOLIC BLOOD PRESSURE: 108 MMHG

## 2022-06-20 DIAGNOSIS — M79.672 LEFT FOOT PAIN: ICD-10-CM

## 2022-06-20 DIAGNOSIS — G89.29 CHRONIC LEFT SHOULDER PAIN: ICD-10-CM

## 2022-06-20 DIAGNOSIS — M25.512 CHRONIC LEFT SHOULDER PAIN: ICD-10-CM

## 2022-06-20 DIAGNOSIS — H57.13 PAIN OF BOTH EYES: ICD-10-CM

## 2022-06-20 DIAGNOSIS — H57.89 EYE IRRITATION: Primary | ICD-10-CM

## 2022-06-20 PROCEDURE — 3078F DIAST BP <80 MM HG: CPT | Mod: CPTII,S$GLB,, | Performed by: INTERNAL MEDICINE

## 2022-06-20 PROCEDURE — 3074F PR MOST RECENT SYSTOLIC BLOOD PRESSURE < 130 MM HG: ICD-10-PCS | Mod: CPTII,S$GLB,, | Performed by: INTERNAL MEDICINE

## 2022-06-20 PROCEDURE — 1159F MED LIST DOCD IN RCRD: CPT | Mod: CPTII,S$GLB,, | Performed by: INTERNAL MEDICINE

## 2022-06-20 PROCEDURE — 99999 PR PBB SHADOW E&M-EST. PATIENT-LVL V: CPT | Mod: PBBFAC,,, | Performed by: INTERNAL MEDICINE

## 2022-06-20 PROCEDURE — 99214 PR OFFICE/OUTPT VISIT, EST, LEVL IV, 30-39 MIN: ICD-10-PCS | Mod: S$GLB,,, | Performed by: INTERNAL MEDICINE

## 2022-06-20 PROCEDURE — 99214 OFFICE O/P EST MOD 30 MIN: CPT | Mod: S$GLB,,, | Performed by: INTERNAL MEDICINE

## 2022-06-20 PROCEDURE — 1159F PR MEDICATION LIST DOCUMENTED IN MEDICAL RECORD: ICD-10-PCS | Mod: CPTII,S$GLB,, | Performed by: INTERNAL MEDICINE

## 2022-06-20 PROCEDURE — 99999 PR PBB SHADOW E&M-EST. PATIENT-LVL V: ICD-10-PCS | Mod: PBBFAC,,, | Performed by: INTERNAL MEDICINE

## 2022-06-20 PROCEDURE — 3008F BODY MASS INDEX DOCD: CPT | Mod: CPTII,S$GLB,, | Performed by: INTERNAL MEDICINE

## 2022-06-20 PROCEDURE — 3008F PR BODY MASS INDEX (BMI) DOCUMENTED: ICD-10-PCS | Mod: CPTII,S$GLB,, | Performed by: INTERNAL MEDICINE

## 2022-06-20 PROCEDURE — 3074F SYST BP LT 130 MM HG: CPT | Mod: CPTII,S$GLB,, | Performed by: INTERNAL MEDICINE

## 2022-06-20 PROCEDURE — 3078F PR MOST RECENT DIASTOLIC BLOOD PRESSURE < 80 MM HG: ICD-10-PCS | Mod: CPTII,S$GLB,, | Performed by: INTERNAL MEDICINE

## 2022-06-20 NOTE — PROGRESS NOTES
Subjective:       Patient ID: Medardo Vizcaino III is a 51 y.o. male.   Chief Complaint: Pain    HPI: Pt comes in to review left sided neck, shoulder and arm pain as well as left leg pain and left foot pain.  Patient has had musculoskeletal problems a while.  He is trying to organize next.  Four months ago he had a neck epidural for left arm pain numbness and tingling.  It helped some, but not totally and did not last.  He has been having left shoulder pain and the left neck and on symptoms.  MRI showed C5-6 7 abnormalities and nerve conduction showed cervical 6 through 7 radiculopathy so they went with the epidural.  It was thought possibly of his left hip in leg symptoms were coming from the neck because an MRI a few months previous did not show any pathology in the back.  He even saw Urology for the groin pain but that did not seem to show anything of note.  The left lateral distal foot discomfort could be from hiking or a minor injury and maybe different but he would like to see Podiatry for that.  We reviewed his history in these areas and decided to see Podiatry and see orthopedics since it had been 2 years, to review the left shoulder.  He does not want to have to do neck surgery but if Podiatry and Orthopedics think that the foot and shoulder are unremarkable he will follow-up with his spine specialist.  He is seeing Dr. Sebastian Nguyen who is in anesthesia Pain cannabis specialist.  He does use medical cannabis and I reviewed that on the .    Review of Systems   Constitutional: Negative for chills, fatigue and fever.   HENT: Negative for nosebleeds and trouble swallowing.    Eyes: Negative for pain and visual disturbance.   Respiratory: Negative for cough, shortness of breath and wheezing.    Cardiovascular: Negative for chest pain and palpitations.   Gastrointestinal: Negative for abdominal pain, constipation, diarrhea, nausea and vomiting.   Genitourinary: Negative for difficulty urinating and hematuria.    Musculoskeletal: Positive for arthralgias, back pain, leg pain and neck pain.   Integumentary:  Negative for rash.   Neurological: Negative for dizziness and headaches.   Hematological: Does not bruise/bleed easily.   Psychiatric/Behavioral: Negative for dysphoric mood and sleep disturbance.          Objective:      Physical Exam  Constitutional:       Appearance: Normal appearance.   Cardiovascular:      Rate and Rhythm: Normal rate and regular rhythm.      Pulses: Normal pulses.   Abdominal:      Tenderness: There is no abdominal tenderness.   Musculoskeletal:         General: Tenderness (Left shoulder, left leg and left foot) present.   Neurological:      General: No focal deficit present.      Mental Status: He is alert and oriented to person, place, and time.      Motor: Weakness ( left 5th finger) present.      Coordination: Coordination normal.   Psychiatric:         Mood and Affect: Mood normal.         Behavior: Behavior normal.         Assessment:       Problem List Items Addressed This Visit    None     Visit Diagnoses     Eye irritation    -  Primary    Relevant Orders    Ambulatory referral/consult to Ophthalmology    Pain of both eyes        Relevant Orders    Ambulatory referral/consult to Ophthalmology    Chronic left shoulder pain        Relevant Orders    Ambulatory referral/consult to Orthopedics    X-Ray Shoulder 2 or More Views Left    Left foot pain        Relevant Orders    Ambulatory referral/consult to Podiatry          Plan:       Medardo was seen today for pain.    Diagnoses and all orders for this visit:    Eye irritation  -     Ambulatory referral/consult to Ophthalmology; Future    Pain of both eyes  -     Ambulatory referral/consult to Ophthalmology; Future    Chronic left shoulder pain  -     Ambulatory referral/consult to Orthopedics; Future  -     X-Ray Shoulder 2 or More Views Left; Future    Left foot pain  -     Ambulatory referral/consult to Podiatry; Future           Review  "all studies and consult and follow-up in few months sooner p.r.n.      Portions of this note may have been created with voice recognition software. Occasional "wrong-word" or "sound-a-like" substitutions may have occurred due to the inherent limitations of voice recognition software. Please, read the note carefully and recognize, using context, where substitutions have occurred.  "

## 2022-06-22 ENCOUNTER — TELEPHONE (OUTPATIENT)
Dept: OPHTHALMOLOGY | Facility: CLINIC | Age: 52
End: 2022-06-22
Payer: COMMERCIAL

## 2022-06-22 ENCOUNTER — TELEPHONE (OUTPATIENT)
Dept: INTERNAL MEDICINE | Facility: CLINIC | Age: 52
End: 2022-06-22
Payer: COMMERCIAL

## 2022-06-22 NOTE — TELEPHONE ENCOUNTER
I just spoke with pt. He stated he wanted to see an ophthalmolgist only. I explained to him that optometry sees pt.s for other eye conditions not just glasses. I asked him what kind of problems he was having. He would not explain. I told him I needed this information. To book him with the correct Dr. He stated he did not trust O.D.'s.I did tell him our doctors are highly trained. Then asked him again what kind of problem he was having. He stated that I obviously didn't know who I was talking too. I did not understand what he said his position was, and asked him again what kind of problem he was having. He hung the phone up while I tried to book an appointment

## 2022-06-22 NOTE — TELEPHONE ENCOUNTER
----- Message from Jennifer Gill sent at 6/22/2022  3:59 PM CDT -----  Regarding: Pain in eye  Contact: Pt  Please call pt @ [phone]. Pt need  urgent appt. Pt is having severe eye pain.

## 2022-06-22 NOTE — TELEPHONE ENCOUNTER
----- Message from Keri Galvan sent at 6/22/2022  4:31 PM CDT -----  Contact: [phone]  Pt is calling for the opthalmology nurse will not schedule an appt for him so he is calling to so see if you can schedule this for him with an pathologist please advise and give return call

## 2022-06-28 ENCOUNTER — TELEPHONE (OUTPATIENT)
Dept: INTERNAL MEDICINE | Facility: CLINIC | Age: 52
End: 2022-06-28
Payer: COMMERCIAL

## 2022-06-28 NOTE — TELEPHONE ENCOUNTER
Informed pt opthalmology is not accepting new pt's. Pt states he will contact his insurnace to go outside of [facility] network

## 2022-06-28 NOTE — TELEPHONE ENCOUNTER
----- Message from Tierney Jacobo sent at 6/28/2022 11:17 AM CDT -----  Contact: Self/[phone]  Patient is returning a phone call.  Who left a message for the patient: Xiao   Does patient know what this is regarding:  Yes  Would you like a call back, or a response through your [facility] portal?: call back   Comments:

## 2022-07-08 ENCOUNTER — CLINICAL SUPPORT (OUTPATIENT)
Dept: REHABILITATION | Facility: HOSPITAL | Age: 52
End: 2022-07-08
Payer: COMMERCIAL

## 2022-07-08 DIAGNOSIS — M54.2 NECK PAIN: Primary | ICD-10-CM

## 2022-07-08 DIAGNOSIS — G89.29 CHRONIC PAIN OF BOTH SHOULDERS: ICD-10-CM

## 2022-07-08 DIAGNOSIS — M25.511 CHRONIC PAIN OF BOTH SHOULDERS: ICD-10-CM

## 2022-07-08 DIAGNOSIS — M25.512 CHRONIC PAIN OF BOTH SHOULDERS: ICD-10-CM

## 2022-07-08 PROCEDURE — 97811 ACUP 1/> W/O ESTIM EA ADD 15: CPT

## 2022-07-08 PROCEDURE — 97810 ACUP 1/> WO ESTIM 1ST 15 MIN: CPT

## 2022-07-08 NOTE — PROGRESS NOTES
Acupuncture Treatment Note     Name: Medardo Vizcaino III  Clinic Number: [id number]    Traditional Chinese Medicine Diagnosis: Qi and Blood stagnated in Neck Shoulder area   Physician: Self, Aaareferral    Date of Service: 7/8/2022     Medical Diagnosis: Pain in Neck Shoulder area,   Evaluation Date: 3/29/2022  Plan of Care Certification Period: 12  Visit #/Visits authorized: 7 / 12     Precautions: Standard    Subjective     Chief Complaint:  Pain in Neck Shoulder area,     Response to Previous Treatment:  Lower Back Pain and Leg pain were disappeared    Quality of Symptoms (Better/Worse):  better    Other Condition/Symptoms: None    Objective      New Findings:  None    Treatment Principles:  Increasing Qi and Blood in Neck Shoulder area     Acupuncture Points:   Bilateral: GB 21, GB 20, Shaila around SCM muscle  2 +. Shaila on Upper Back +3    NEEDLES W/O STIM  AT: 1:00 PM    NEEDLES W/O STIM REMOVED AT: 1:40 PM    #NEEDLES IN: 14    #NEEDLES OUT: 14    Other Traditional Chinese Medicine Modalities:  No    Recommendations:  PT    Education:  Patient is aware of cumulative effect of acupuncture      Assessment      Analysis of Treatment: Patient felt very good    Pt prognosis is Excellent.     Patient will continue to benefit from acupuncture treatment to address the deficits listed in the problem list box on initial evaluation, provide patient family education and to maximize pt's level of independence in the home and community environment.     Patient's spiritual, cultural and educational needs considered and pt agreeable to plan of care and goals.     Anticipated barriers to treatment: None    Plan     Recommend     1   /week for 12  treatments and re-assess.

## 2022-07-20 ENCOUNTER — TELEPHONE (OUTPATIENT)
Dept: SPORTS MEDICINE | Facility: CLINIC | Age: 52
End: 2022-07-20
Payer: COMMERCIAL

## 2022-07-20 ENCOUNTER — HOSPITAL ENCOUNTER (OUTPATIENT)
Dept: RADIOLOGY | Facility: HOSPITAL | Age: 52
Discharge: HOME | End: 2022-07-20
Attending: INTERNAL MEDICINE
Payer: COMMERCIAL

## 2022-07-20 ENCOUNTER — OFFICE VISIT (OUTPATIENT)
Dept: PODIATRY | Facility: CLINIC | Age: 52
End: 2022-07-20
Payer: COMMERCIAL

## 2022-07-20 VITALS
HEIGHT: 68 IN | SYSTOLIC BLOOD PRESSURE: 123 MMHG | HEART RATE: 47 BPM | BODY MASS INDEX: 26.36 KG/M2 | WEIGHT: 173.94 LBS | DIASTOLIC BLOOD PRESSURE: 81 MMHG

## 2022-07-20 DIAGNOSIS — R20.0 NUMBNESS OF TOES: Primary | ICD-10-CM

## 2022-07-20 DIAGNOSIS — G60.9 IDIOPATHIC PERIPHERAL NEUROPATHY: ICD-10-CM

## 2022-07-20 DIAGNOSIS — M24.573 EQUINUS CONTRACTURE OF ANKLE: ICD-10-CM

## 2022-07-20 DIAGNOSIS — G89.29 CHRONIC LEFT SHOULDER PAIN: ICD-10-CM

## 2022-07-20 DIAGNOSIS — M25.512 CHRONIC LEFT SHOULDER PAIN: ICD-10-CM

## 2022-07-20 DIAGNOSIS — R20.2 PARESTHESIA OF BOTH LOWER EXTREMITIES: ICD-10-CM

## 2022-07-20 DIAGNOSIS — M25.673 STIFFNESS OF ANKLE JOINT, UNSPECIFIED LATERALITY: ICD-10-CM

## 2022-07-20 PROCEDURE — 1159F MED LIST DOCD IN RCRD: CPT | Mod: CPTII,S$GLB,, | Performed by: PODIATRIST

## 2022-07-20 PROCEDURE — 3074F SYST BP LT 130 MM HG: CPT | Mod: CPTII,S$GLB,, | Performed by: PODIATRIST

## 2022-07-20 PROCEDURE — 73030 X-RAY EXAM OF SHOULDER: CPT | Mod: 26,LT,, | Performed by: RADIOLOGY

## 2022-07-20 PROCEDURE — 1159F PR MEDICATION LIST DOCUMENTED IN MEDICAL RECORD: ICD-10-PCS | Mod: CPTII,S$GLB,, | Performed by: PODIATRIST

## 2022-07-20 PROCEDURE — 1160F PR REVIEW ALL MEDS BY PRESCRIBER/CLIN PHARMACIST DOCUMENTED: ICD-10-PCS | Mod: CPTII,S$GLB,, | Performed by: PODIATRIST

## 2022-07-20 PROCEDURE — 3008F PR BODY MASS INDEX (BMI) DOCUMENTED: ICD-10-PCS | Mod: CPTII,S$GLB,, | Performed by: PODIATRIST

## 2022-07-20 PROCEDURE — 3008F BODY MASS INDEX DOCD: CPT | Mod: CPTII,S$GLB,, | Performed by: PODIATRIST

## 2022-07-20 PROCEDURE — 3079F DIAST BP 80-89 MM HG: CPT | Mod: CPTII,S$GLB,, | Performed by: PODIATRIST

## 2022-07-20 PROCEDURE — 1160F RVW MEDS BY RX/DR IN RCRD: CPT | Mod: CPTII,S$GLB,, | Performed by: PODIATRIST

## 2022-07-20 PROCEDURE — 3074F PR MOST RECENT SYSTOLIC BLOOD PRESSURE < 130 MM HG: ICD-10-PCS | Mod: CPTII,S$GLB,, | Performed by: PODIATRIST

## 2022-07-20 PROCEDURE — 73030 X-RAY EXAM OF SHOULDER: CPT | Mod: TC,LT

## 2022-07-20 PROCEDURE — 99999 PR PBB SHADOW E&M-EST. PATIENT-LVL III: ICD-10-PCS | Mod: PBBFAC,,, | Performed by: PODIATRIST

## 2022-07-20 PROCEDURE — 99214 OFFICE O/P EST MOD 30 MIN: CPT | Mod: S$GLB,,, | Performed by: PODIATRIST

## 2022-07-20 PROCEDURE — 73030 XR SHOULDER COMPLETE 2 OR MORE VIEWS LEFT: ICD-10-PCS | Mod: 26,LT,, | Performed by: RADIOLOGY

## 2022-07-20 PROCEDURE — 3079F PR MOST RECENT DIASTOLIC BLOOD PRESSURE 80-89 MM HG: ICD-10-PCS | Mod: CPTII,S$GLB,, | Performed by: PODIATRIST

## 2022-07-20 PROCEDURE — 99999 PR PBB SHADOW E&M-EST. PATIENT-LVL III: CPT | Mod: PBBFAC,,, | Performed by: PODIATRIST

## 2022-07-20 PROCEDURE — 99214 PR OFFICE/OUTPT VISIT, EST, LEVL IV, 30-39 MIN: ICD-10-PCS | Mod: S$GLB,,, | Performed by: PODIATRIST

## 2022-07-20 RX ORDER — DICLOFENAC SODIUM 10 MG/G
2 GEL TOPICAL 3 TIMES DAILY
Qty: 100 G | Refills: 3 | Status: SHIPPED | OUTPATIENT
Start: 2022-07-20 | End: 2022-09-27 | Stop reason: SDUPTHER

## 2022-07-20 NOTE — TELEPHONE ENCOUNTER
LVM informing patient that his appointment will need to be rescheduled due to the provider being in surgery.

## 2022-07-20 NOTE — PROGRESS NOTES
"Subjective:      Patient ID: Medardo Vizcaino III is a 51 y.o. male.    Chief Complaint: Foot Pain (For about 3 years; nothing has made it worse, just constant), Ankle Pain (For about 3 years), and Numbness (About 3 years)    Medardo is a 51 y.o. male who presents to the podiatry clinic  with complaint of  left foot pain and numbness. Onset of the symptoms was several years ago. Precipitating event: none known. Current symptoms include: ability to bear weight, but with some pain, worsening symptoms after a period of activity and numbness and burning pain in left foot and ankle. Aggravating factors: any weightbearing, hiking, walking long distances, biking. Symptoms have progressed to a point and plateaued. Patient has had prior foot problems. Evaluation to date: none. Treatment to date: rest and shoe changes. Patients rates pain 3/10 on pain scale.  Also gets general stiffness in left foot and ankle if he is sitting for a while (works from home) and gets up.     Vitals:    07/20/22 0925   BP: 123/81   Pulse: (!) 47   Weight: 78.9 kg (173 lb 15.1 oz)   Height: 5' 8" (1.727 m)   PainSc:   3       Review of Systems   Constitutional: Negative for chills and fever.   Cardiovascular: Negative for chest pain, claudication and leg swelling.   Respiratory: Negative for cough and shortness of breath.    Skin: Negative for dry skin and nail changes.   Musculoskeletal: Positive for arthritis, joint pain, myalgias and stiffness.   Gastrointestinal: Negative for nausea and vomiting.   Neurological: Positive for numbness, paresthesias and sensory change.   Psychiatric/Behavioral: Negative for altered mental status.           Objective:      Physical Exam  Vitals reviewed.   Constitutional:       Appearance: He is well-developed.   HENT:      Head: Normocephalic.   Cardiovascular:      Pulses:           Dorsalis pedis pulses are 2+ on the right side and 2+ on the left side.        Posterior tibial pulses are 2+ on the right side " and 2+ on the left side.      Comments: CRT < 3 sec to tips of toes. No edema noted to b/l LE. No vericosities noted to b/l LEs.     Pulmonary:      Effort: No respiratory distress.   Musculoskeletal:      Comments: Equinus contracture noted to b/l ankles with knee straight and slightly improved with knee bent.  General stiffness of pedal and ankle joints b/l.   Otherwise rectus foot and toe position bilateral with no major deformities noted. Mild equinus noted b/l ankles with < 10 deg DF noted. MMT 5/5 in DF/PF/Inv/Ev resistance with no reproduction of pain in any direction. Passive range of motion of ankle and pedal joints is painless b/l.     Skin:     General: Skin is warm and dry.      Findings: No erythema.      Comments: No open lesions, lacerations or wounds noted. Nails are normal to R 1-5 and L 1-5. Interdigital spaces clean, dry and intact b/l. No erythema noted to b/l foot. Skin texture normal. Pedal hair normal. Skin temperature normal b/l foot.      Neurological:      Mental Status: He is alert and oriented to person, place, and time.      Sensory: Sensory deficit present.      Comments: Light touch, proprioception, and sharp/dull sensation are all intact bilaterally. Protective threshold with the [place]-Wienstein monofilament is intact bilaterally. Subjective paresthesias with no clearly identifiable source or trigger.    Psychiatric:         Behavior: Behavior normal.         Thought Content: Thought content normal.         Judgment: Judgment normal.               Assessment:       Encounter Diagnoses   Name Primary?    Idiopathic peripheral neuropathy     Numbness of toes Yes    Equinus contracture of ankle     Stiffness of ankle joint, unspecified laterality     Paresthesia of both lower extremities          Plan:       Medardo was seen today for foot pain, ankle pain and numbness.    Diagnoses and all orders for this visit:    Numbness of toes  -     EMG W/ ULTRASOUND AND NERVE CONDUCTION  TEST 2 Extremities; Future    Idiopathic peripheral neuropathy  -     Ambulatory referral/consult to Podiatry  -     EMG W/ ULTRASOUND AND NERVE CONDUCTION TEST 2 Extremities; Future    Equinus contracture of ankle    Stiffness of ankle joint, unspecified laterality    Paresthesia of both lower extremities  -     EMG W/ ULTRASOUND AND NERVE CONDUCTION TEST 2 Extremities; Future    Other orders  -     diclofenac sodium (VOLTAREN) 1 % Gel; Apply 2 g topically 3 (three) times daily. Apply to the area of pain 2-3x per day or night as needed      I counseled the patient on his conditions, their implications and medical management.    Advised to stretch calf muscles multiple times daily for increased flexibility of ankles and to help decrease forefoot pressure over time. Illustrative stretching exercise handout provided.     Recommended custom orthotics for long term use. Recommended Dr. Crews for traditional plaster method.     Rx Voltaren gel to be applied to affected area up to 3-4 x daily as needed for pain.     Long discussion with patient regarding appropriate, supportive and comfortable shoes. Recommended supportive athletic shoe brands with adequate arch supports to alleviate abnormal pressure and improve stability of foot while walking. Avoid flat shoes and barefoot walking as these will exacerbate or worsen symptoms.     EMG/NCS ordered for further assessment of nerve function.     RTC after EMG/NCS completed, sooner PRN.

## 2022-07-21 ENCOUNTER — OFFICE VISIT (OUTPATIENT)
Dept: SPORTS MEDICINE | Facility: CLINIC | Age: 52
End: 2022-07-21
Payer: COMMERCIAL

## 2022-07-21 VITALS
DIASTOLIC BLOOD PRESSURE: 75 MMHG | SYSTOLIC BLOOD PRESSURE: 110 MMHG | WEIGHT: 169 LBS | HEIGHT: 68 IN | BODY MASS INDEX: 25.61 KG/M2 | HEART RATE: 46 BPM

## 2022-07-21 DIAGNOSIS — G89.29 CHRONIC LEFT SHOULDER PAIN: ICD-10-CM

## 2022-07-21 DIAGNOSIS — M25.512 CHRONIC LEFT SHOULDER PAIN: ICD-10-CM

## 2022-07-21 PROCEDURE — 99214 OFFICE O/P EST MOD 30 MIN: CPT | Mod: S$GLB,,, | Performed by: ORTHOPAEDIC SURGERY

## 2022-07-21 PROCEDURE — 3074F SYST BP LT 130 MM HG: CPT | Mod: CPTII,S$GLB,, | Performed by: ORTHOPAEDIC SURGERY

## 2022-07-21 PROCEDURE — 99999 PR PBB SHADOW E&M-EST. PATIENT-LVL III: ICD-10-PCS | Mod: PBBFAC,,, | Performed by: ORTHOPAEDIC SURGERY

## 2022-07-21 PROCEDURE — 99999 PR PBB SHADOW E&M-EST. PATIENT-LVL III: CPT | Mod: PBBFAC,,, | Performed by: ORTHOPAEDIC SURGERY

## 2022-07-21 PROCEDURE — 3078F DIAST BP <80 MM HG: CPT | Mod: CPTII,S$GLB,, | Performed by: ORTHOPAEDIC SURGERY

## 2022-07-21 PROCEDURE — 1159F PR MEDICATION LIST DOCUMENTED IN MEDICAL RECORD: ICD-10-PCS | Mod: CPTII,S$GLB,, | Performed by: ORTHOPAEDIC SURGERY

## 2022-07-21 PROCEDURE — 3008F PR BODY MASS INDEX (BMI) DOCUMENTED: ICD-10-PCS | Mod: CPTII,S$GLB,, | Performed by: ORTHOPAEDIC SURGERY

## 2022-07-21 PROCEDURE — 1159F MED LIST DOCD IN RCRD: CPT | Mod: CPTII,S$GLB,, | Performed by: ORTHOPAEDIC SURGERY

## 2022-07-21 PROCEDURE — 99214 PR OFFICE/OUTPT VISIT, EST, LEVL IV, 30-39 MIN: ICD-10-PCS | Mod: S$GLB,,, | Performed by: ORTHOPAEDIC SURGERY

## 2022-07-21 PROCEDURE — 3078F PR MOST RECENT DIASTOLIC BLOOD PRESSURE < 80 MM HG: ICD-10-PCS | Mod: CPTII,S$GLB,, | Performed by: ORTHOPAEDIC SURGERY

## 2022-07-21 PROCEDURE — 3008F BODY MASS INDEX DOCD: CPT | Mod: CPTII,S$GLB,, | Performed by: ORTHOPAEDIC SURGERY

## 2022-07-21 PROCEDURE — 3074F PR MOST RECENT SYSTOLIC BLOOD PRESSURE < 130 MM HG: ICD-10-PCS | Mod: CPTII,S$GLB,, | Performed by: ORTHOPAEDIC SURGERY

## 2022-07-21 NOTE — PROGRESS NOTES
CC: LEFT shoulder pain    Patient returns to clinic for follow up of left shoulder. He reports he has seen Dr. Munoz for his spine and has completed PT and had a nerve study/block done. He continues to endorse left shoulder pain with no new trauma. He has completed PT and states no relief.     Interval HPI:   51 y.o. Male with a history of R RCR, SAD, DCE, biceps tenodesis (2014) who presents with L shoulder discomfort of one year duration.  No injury.  Has associated numbness and tingling down to ulnar fingers.  Worse when hunched.  Also has L foot numbness.  Has back pain.    He reports that the pain and weakness is worse with overhead activity. It also bothers him at night.    Is affecting ADLs.  Pain is mild      Past Medical History:   Diagnosis Date    Acromioclavicular joint arthritis 3/20/2014    Hypothyroidism        Past Surgical History:   Procedure Laterality Date    EPIDURAL STEROID INJECTION N/A 2/9/2022    Procedure: Injection, Steroid, Epidural C7/T1  DIRECT REFERRAL;  Surgeon: Deon Strauss MD;  Location: [facility];  Service: Pain Management;  Laterality: N/A;    NASAL SEPTUM SURGERY      SHOULDER ARTHROSCOPY      SHOULDER SURGERY      TONSILLECTOMY      TUMOR EXCISION      arm; lipoma       Family History   Problem Relation Age of Onset    Hypertension Father     Pacemaker/defibrilator Father         Pacemaker    Diabetes Maternal Grandmother     Diabetes Maternal Grandfather     Heart disease Paternal Grandfather     Arthritis Mother     Hypothyroidism Mother     Atrial fibrillation Mother     Hypothyroidism Sister     No Known Problems Paternal Grandmother     No Known Problems Maternal Aunt     No Known Problems Maternal Uncle     No Known Problems Paternal Aunt     No Known Problems Paternal Uncle     Amblyopia Neg Hx     Blindness Neg Hx     Cancer Neg Hx     Cataracts Neg Hx     Glaucoma Neg Hx     Macular degeneration Neg Hx     Retinal detachment Neg  Hx     Strabismus Neg Hx     Stroke Neg Hx     Thyroid disease Neg Hx     Psoriasis Neg Hx     Inflammatory bowel disease Neg Hx     Lupus Neg Hx          Current Outpatient Medications:     diclofenac sodium (VOLTAREN) 1 % Gel, Apply 2 g topically 3 (three) times daily. Apply to the area of pain 2-3x per day or night as needed, Disp: 100 g, Rfl: 3    levothyroxine (SYNTHROID) 75 MCG tablet, Take 1 tablet by mouth once daily, Disp: 90 tablet, Rfl: 3    vitamin B complex (B COMPLEX-VITAMIN B12 ORAL), Take by mouth., Disp: , Rfl:     omega-3 fatty acids/fish oil (FISH OIL-OMEGA-3 FATTY ACIDS) 300-1,000 mg capsule, Take by mouth once daily., Disp: , Rfl:     triamterene-hydrochlorothiazide 75-50 mg (MAXZIDE) 75-50 mg per tablet, Take 1 tablet by mouth once daily. (Patient not taking: No sig reported), Disp: 90 tablet, Rfl: 1    Review of patient's allergies indicates:   Allergen Reactions    Sulfa (sulfonamide antibiotics) Hives          REVIEW OF SYSTEMS:  Constitution: Negative. Negative for chills, fever and night sweats.   HENT: Negative for congestion and headaches.    Eyes: Negative for blurred vision, left vision loss and right vision loss.   Cardiovascular: Negative for chest pain and syncope.   Respiratory: Negative for cough and shortness of breath.    Endocrine: Negative for polydipsia, polyphagia and polyuria.   Hematologic/Lymphatic: Negative for bleeding problem. Does not bruise/bleed easily.   Skin: Negative for dry skin, itching and rash.   Musculoskeletal: Negative for falls.  Positive for left shoulder pain and muscle weakness.   Gastrointestinal: Negative for abdominal pain and bowel incontinence.   Genitourinary: Negative for bladder incontinence and nocturia.   Neurological: Negative for disturbances in coordination, loss of balance and seizures.   Psychiatric/Behavioral: Negative for depression. The patient does not have insomnia.    Allergic/Immunologic: Negative for hives and  "persistent infections.      PHYSICAL EXAMINATION:  Vitals:  /75   Pulse (!) 46   Ht 5' 8" (1.727 m)   Wt 76.7 kg (169 lb)   BMI 25.70 kg/m²    General: The patient is alert and oriented x 3.  Mood is pleasant.  Observation of ears, eyes and nose reveal no gross abnormalities.  No labored breathing observed.  Gait is coordinated. Patient can toe walk and heel walk without difficulty.      LEFT Shoulder / Upper Extremity Exam    OBSERVATION:     Swelling  none  Deformity  none   Discoloration  none   Scapular winging none   Scars   none  Atrophy  none    TENDERNESS / CREPITUS (T/C):          T/C      T/C   Clavicle   -/-  SUPRAspinatus    -/-     AC Jt.    -/-  INFRAspinatus  -/-    SC Jt.    -/-  Deltoid    -/-      G. Tuberosity  -/-  LH BICEP groove  -/-   Acromion:  -/-  Midline Neck   -/-     Scapular Spine -/-  Trapezium   -/-   SMA Scapula  -/-  GH jt. line - post  -/-     Scapulothoracic  -/-         ROM: (* = with pain)  Right shoulder   Left shoulder    Abduction   180    180   Flexion    180    180   ER    70    70   IR    T10    T10    STRENGTH: (* = with pain) Right shoulder   Left shoulder    SCAPTION   5/5    5/5    IR    5/5    5/5   ER    5/5    5/5   BICEPS   5/5    5/5   Deltoid    5/5    5/5     SIGNS:  Painful side       NEER   -    ORASHMIS  neg    CHAVEZ   -    SPEEDS  neg     DROP ARM   -   BELLY PRESS neg   Superior escape none    LIFT-OFF  neg   X-Body ADD    neg    MOVING VALGUS neg        STABILITY TESTING    Right shoulder   Left shoulder    Translation     Anterior  up face     up face    Posterior  up face    up face    Sulcus   < 10mm    < 10 mm     Signs   Apprehension   neg      neg       Relocation   no change     no change      Jerk test  neg     neg    EXTREMITY NEURO-VASCULAR EXAM:    Sensation grossly intact to light touch all dermatomal regions.    DTR 2+ Biceps, Triceps, BR and Negative Nimcos sign   Grossly intact motor function at Elbow, Wrist and " Hand   Distal pulses radial and ulnar 2+, brisk cap refill, symmetric.      NECK:  Painless FROM and spinous processes non-tender. Negative Spurlings sign.      OTHER FINDINGS:  mild scapular dyskinesia  Negative Tinel's at elbow/ulnar nerve  Positive Phalen's compression at elbow with hyperflexion of elbow  Negative Hodge's examination  Negative Spurlings    XRAYS:  Xrays including AP, Outlet and Axillary Lateral of Left shoulder are ordered / images reviewed by me:   No fracture dislocation or other pathology   Acromion type 2   Proximal migration of humeral head: None   GH arthritis: None    Previous cervical spine x-rays demonstrate Mild disc narrowing and marginal vertebral endplate and uncovertebral spurring are seen at the C5-6 level.  Examination is otherwise essentially unremarkable.     MRI Cervical spine:    CLINICAL HISTORY:  Neck pain, abnormal neuro exam; Cervicalgia     TECHNIQUE:  Multiplanar, multisequence MR images of the cervical spine were performed without the administration of contrast.     COMPARISON:  Cervical spine radiograph 10/12/2021     FINDINGS:  C1-C2: Dens is intact.  Pre dens space is maintained.     Alignment: Normal.     Vertebrae: Normal marrow signal.  No evidence of abnormal infiltrative process.  No fracture.     Discs: Mild disc height loss at C5-C6.     Cord: Normal.     Skull base and craniocervical junction: Normal.     Degenerative findings:     C2-C3: No spinal canal stenosis or neural foraminal narrowing.     C3-C4: No spinal canal stenosis or neural foraminal narrowing.     C4-C5: Posterior disc osteophyte complex and uncovertebral spurring contributing to mild left neural foraminal narrowing and mild spinal canal stenosis.     C5-C6: Posterior disc osteophyte complex and uncovertebral spurring contributing to severe bilateral neural foraminal narrowing and moderate spinal canal stenosis.     C6-C7: Posterior disc osteophyte complex and uncovertebral spurring  contributing to severe left neural foraminal narrowing, mild spinal canal stenosis.     C7-T1: No spinal canal stenosis or neural foraminal narrowing.     Paraspinal muscles & soft tissues: Unremarkable.     Impression:     Degenerative changes of the cervical spine detailed above.  Moderate spinal canal stenosis noted at C5-C6.  Severe neural foraminal narrowing noted at C5-C7.     Electronically signed by resident: Donovan Kingston  Date:                                            07/22/2021  Time:                                           13:08     Electronically signed by: Ford Rodriguez MD  Date:                                            07/22/2021  Time:                                           16:06     Updated xrays left shoulder -  FINDINGS:  No acute displaced fracture, dislocation or suspicious osseous lesion. Anatomic alignment is maintained.     Regional soft tissues are grossly unremarkable.     Impression:     1. No acute displaced fracture, dislocation or suspicious osseous lesion.    ASSESSMENT: Left shoulder pain     PLAN:      1. MRI of left shoulder to evaluate for RCR.     2. F/u in clinic for results.     All questions were answered, patient will contact us for questions or concerns in the interim.

## 2022-08-09 RX ORDER — LEVOTHYROXINE SODIUM 75 UG/1
75 TABLET ORAL DAILY
Qty: 90 TABLET | Refills: 0 | Status: SHIPPED | OUTPATIENT
Start: 2022-08-09 | End: 2022-11-17

## 2022-08-09 NOTE — TELEPHONE ENCOUNTER
Refill Decision Note   Medardo Vizcaino  is requesting a refill authorization.  Brief Assessment and Rationale for Refill:  Approve     Medication Therapy Plan:  TSH within normal range    Medication Reconciliation Completed: No   Comments:     No Care Gaps recommended.     Note composed:1:44 PM 08/09/2022

## 2022-08-09 NOTE — TELEPHONE ENCOUNTER
No new care gaps identified.  [facility] Embedded Care Gaps. Reference number: [id number]. 8/09/2022   9:52:40 AM CDT

## 2022-08-09 NOTE — TELEPHONE ENCOUNTER
----- Message from Magda Torres sent at 8/9/2022  8:24 AM CDT -----  Contact: [facility]   Requesting an RX refill or new RX.  Is this a refill or new RX: new  RX name and strength (copy/paste from chart):  levothyroxine (SYNTHROID) 75 MCG tablet  Is this a 30 day or 90 day RX: 90  Pharmacy name and phone # (copy/paste from chart):  [facility] - [place], [place] - [address]

## 2022-08-12 ENCOUNTER — HOSPITAL ENCOUNTER (OUTPATIENT)
Dept: RADIOLOGY | Facility: OTHER | Age: 52
Discharge: HOME | End: 2022-08-12
Attending: ORTHOPAEDIC SURGERY
Payer: COMMERCIAL

## 2022-08-12 DIAGNOSIS — G89.29 CHRONIC LEFT SHOULDER PAIN: ICD-10-CM

## 2022-08-12 DIAGNOSIS — M25.512 CHRONIC LEFT SHOULDER PAIN: ICD-10-CM

## 2022-08-12 PROCEDURE — 73221 MRI JOINT UPR EXTREM W/O DYE: CPT | Mod: 26,LT,, | Performed by: RADIOLOGY

## 2022-08-12 PROCEDURE — 73221 MRI JOINT UPR EXTREM W/O DYE: CPT | Mod: TC,LT

## 2022-08-12 PROCEDURE — 73221 MRI SHOULDER WITHOUT CONTRAST LEFT: ICD-10-PCS | Mod: 26,LT,, | Performed by: RADIOLOGY

## 2022-08-16 ENCOUNTER — OFFICE VISIT (OUTPATIENT)
Dept: SPORTS MEDICINE | Facility: CLINIC | Age: 52
End: 2022-08-16
Payer: COMMERCIAL

## 2022-08-16 VITALS
SYSTOLIC BLOOD PRESSURE: 119 MMHG | WEIGHT: 169 LBS | DIASTOLIC BLOOD PRESSURE: 79 MMHG | BODY MASS INDEX: 25.61 KG/M2 | HEART RATE: 56 BPM | HEIGHT: 68 IN

## 2022-08-16 DIAGNOSIS — G89.29 CHRONIC LEFT SHOULDER PAIN: Primary | ICD-10-CM

## 2022-08-16 DIAGNOSIS — M25.512 CHRONIC LEFT SHOULDER PAIN: Primary | ICD-10-CM

## 2022-08-16 PROCEDURE — 1159F MED LIST DOCD IN RCRD: CPT | Mod: CPTII,S$GLB,, | Performed by: ORTHOPAEDIC SURGERY

## 2022-08-16 PROCEDURE — 99214 PR OFFICE/OUTPT VISIT, EST, LEVL IV, 30-39 MIN: ICD-10-PCS | Mod: S$GLB,,, | Performed by: ORTHOPAEDIC SURGERY

## 2022-08-16 PROCEDURE — 99214 OFFICE O/P EST MOD 30 MIN: CPT | Mod: S$GLB,,, | Performed by: ORTHOPAEDIC SURGERY

## 2022-08-16 PROCEDURE — 99999 PR PBB SHADOW E&M-EST. PATIENT-LVL III: ICD-10-PCS | Mod: PBBFAC,,, | Performed by: ORTHOPAEDIC SURGERY

## 2022-08-16 PROCEDURE — 3078F PR MOST RECENT DIASTOLIC BLOOD PRESSURE < 80 MM HG: ICD-10-PCS | Mod: CPTII,S$GLB,, | Performed by: ORTHOPAEDIC SURGERY

## 2022-08-16 PROCEDURE — 1159F PR MEDICATION LIST DOCUMENTED IN MEDICAL RECORD: ICD-10-PCS | Mod: CPTII,S$GLB,, | Performed by: ORTHOPAEDIC SURGERY

## 2022-08-16 PROCEDURE — 3078F DIAST BP <80 MM HG: CPT | Mod: CPTII,S$GLB,, | Performed by: ORTHOPAEDIC SURGERY

## 2022-08-16 PROCEDURE — 3074F PR MOST RECENT SYSTOLIC BLOOD PRESSURE < 130 MM HG: ICD-10-PCS | Mod: CPTII,S$GLB,, | Performed by: ORTHOPAEDIC SURGERY

## 2022-08-16 PROCEDURE — 99999 PR PBB SHADOW E&M-EST. PATIENT-LVL III: CPT | Mod: PBBFAC,,, | Performed by: ORTHOPAEDIC SURGERY

## 2022-08-16 PROCEDURE — 3074F SYST BP LT 130 MM HG: CPT | Mod: CPTII,S$GLB,, | Performed by: ORTHOPAEDIC SURGERY

## 2022-08-16 PROCEDURE — 3008F PR BODY MASS INDEX (BMI) DOCUMENTED: ICD-10-PCS | Mod: CPTII,S$GLB,, | Performed by: ORTHOPAEDIC SURGERY

## 2022-08-16 PROCEDURE — 3008F BODY MASS INDEX DOCD: CPT | Mod: CPTII,S$GLB,, | Performed by: ORTHOPAEDIC SURGERY

## 2022-08-16 NOTE — PROGRESS NOTES
CC: LEFT shoulder pain    Patient presents for MRI review of left shoulder.  Concern for rotator cuff tear. Patient does not report any new incidents or injuries since their last appointment. Pain and symptoms remain unchanged since his last appointment. Here today to discuss treatment options.       Hx:   Patient returns to clinic for follow up of left shoulder. He reports he has seen Dr. Munoz for his spine and has completed PT and had a nerve study/block done. He continues to endorse left shoulder pain with no new trauma. He has completed PT and states no relief.     Interval HPI:   51 y.o. Male with a history of R RCR, SAD, DCE, biceps tenodesis (2014) who presents with L shoulder discomfort of one year duration.  No injury.  Has associated numbness and tingling down to ulnar fingers.  Worse when hunched.  Also has L foot numbness.  Has back pain.    He reports that the pain and weakness is worse with overhead activity. It also bothers him at night.    Is affecting ADLs.  Pain is mild      Past Medical History:   Diagnosis Date    Acromioclavicular joint arthritis 3/20/2014    Hypothyroidism        Past Surgical History:   Procedure Laterality Date    EPIDURAL STEROID INJECTION N/A 2/9/2022    Procedure: Injection, Steroid, Epidural C7/T1  DIRECT REFERRAL;  Surgeon: Deon Strauss MD;  Location: [facility];  Service: Pain Management;  Laterality: N/A;    NASAL SEPTUM SURGERY      SHOULDER ARTHROSCOPY      SHOULDER SURGERY      TONSILLECTOMY      TUMOR EXCISION      arm; lipoma       Family History   Problem Relation Age of Onset    Hypertension Father     Pacemaker/defibrilator Father         Pacemaker    Diabetes Maternal Grandmother     Diabetes Maternal Grandfather     Heart disease Paternal Grandfather     Arthritis Mother     Hypothyroidism Mother     Atrial fibrillation Mother     Hypothyroidism Sister     No Known Problems Paternal Grandmother     No Known Problems Maternal  Aunt     No Known Problems Maternal Uncle     No Known Problems Paternal Aunt     No Known Problems Paternal Uncle     Amblyopia Neg Hx     Blindness Neg Hx     Cancer Neg Hx     Cataracts Neg Hx     Glaucoma Neg Hx     Macular degeneration Neg Hx     Retinal detachment Neg Hx     Strabismus Neg Hx     Stroke Neg Hx     Thyroid disease Neg Hx     Psoriasis Neg Hx     Inflammatory bowel disease Neg Hx     Lupus Neg Hx          Current Outpatient Medications:     diclofenac sodium (VOLTAREN) 1 % Gel, Apply 2 g topically 3 (three) times daily. Apply to the area of pain 2-3x per day or night as needed, Disp: 100 g, Rfl: 3    levothyroxine (SYNTHROID) 75 MCG tablet, Take 1 tablet (75 mcg total) by mouth once daily., Disp: 90 tablet, Rfl: 0    omega-3 fatty acids/fish oil (FISH OIL-OMEGA-3 FATTY ACIDS) 300-1,000 mg capsule, Take by mouth once daily., Disp: , Rfl:     triamterene-hydrochlorothiazide 75-50 mg (MAXZIDE) 75-50 mg per tablet, Take 1 tablet by mouth once daily. (Patient not taking: No sig reported), Disp: 90 tablet, Rfl: 1    vitamin B complex (B COMPLEX-VITAMIN B12 ORAL), Take by mouth., Disp: , Rfl:     Review of patient's allergies indicates:   Allergen Reactions    Sulfa (sulfonamide antibiotics) Hives          REVIEW OF SYSTEMS:  Constitution: Negative. Negative for chills, fever and night sweats.   HENT: Negative for congestion and headaches.    Eyes: Negative for blurred vision, left vision loss and right vision loss.   Cardiovascular: Negative for chest pain and syncope.   Respiratory: Negative for cough and shortness of breath.    Endocrine: Negative for polydipsia, polyphagia and polyuria.   Hematologic/Lymphatic: Negative for bleeding problem. Does not bruise/bleed easily.   Skin: Negative for dry skin, itching and rash.   Musculoskeletal: Negative for falls.  Positive for left shoulder pain and muscle weakness.   Gastrointestinal: Negative for abdominal pain and bowel  incontinence.   Genitourinary: Negative for bladder incontinence and nocturia.   Neurological: Negative for disturbances in coordination, loss of balance and seizures.   Psychiatric/Behavioral: Negative for depression. The patient does not have insomnia.    Allergic/Immunologic: Negative for hives and persistent infections.      PHYSICAL EXAMINATION:  Vitals:  There were no vitals taken for this visit.   General: The patient is alert and oriented x 3.  Mood is pleasant.  Observation of ears, eyes and nose reveal no gross abnormalities.  No labored breathing observed.  Gait is coordinated. Patient can toe walk and heel walk without difficulty.      LEFT Shoulder / Upper Extremity Exam    OBSERVATION:     Swelling  none  Deformity  none   Discoloration  none   Scapular winging none   Scars   none  Atrophy  none    TENDERNESS / CREPITUS (T/C):          T/C      T/C   Clavicle   -/-  SUPRAspinatus    -/-     AC Jt.    -/-  INFRAspinatus  -/-    SC Jt.    -/-  Deltoid    -/-      G. Tuberosity  -/-  LH BICEP groove  -/-   Acromion:  -/-  Midline Neck   -/-     Scapular Spine -/-  Trapezium   -/-   SMA Scapula  -/-  GH jt. line - post  -/-     Scapulothoracic  -/-         ROM: (* = with pain)  Right shoulder   Left shoulder    Abduction   180    180   Flexion    180    180   ER    70    70   IR    T10    T10    STRENGTH: (* = with pain) Right shoulder   Left shoulder    SCAPTION   5/5    5/5    IR    5/5    5/5   ER    5/5    5/5   BICEPS   5/5    5/5   Deltoid    5/5    5/5     SIGNS:  Painful side       NEER   -    ORASHMIS  neg    CHAVEZ   -    SPEEDS  neg     DROP ARM   -   BELLY PRESS neg   Superior escape none    LIFT-OFF  neg   X-Body ADD    neg    MOVING VALGUS neg        STABILITY TESTING    Right shoulder   Left shoulder    Translation     Anterior  up face     up face    Posterior  up face    up face    Sulcus   < 10mm    < 10 mm     Signs   Apprehension   neg      neg       Relocation   no change     no  change      Jerk test  neg     neg    EXTREMITY NEURO-VASCULAR EXAM:    Sensation grossly intact to light touch all dermatomal regions.    DTR 2+ Biceps, Triceps, BR and Negative Nimcos sign   Grossly intact motor function at Elbow, Wrist and Hand   Distal pulses radial and ulnar 2+, brisk cap refill, symmetric.      NECK:  Painless FROM and spinous processes non-tender. Negative Spurlings sign.      OTHER FINDINGS:  mild scapular dyskinesia  Negative Tinel's at elbow/ulnar nerve  Positive Phalen's compression at elbow with hyperflexion of elbow  Negative Hodge's examination  Negative Spurlings    XRAYS:  Xrays including AP, Outlet and Axillary Lateral of Left shoulder are ordered / images reviewed by me:   No fracture dislocation or other pathology   Acromion type 2   Proximal migration of humeral head: None   GH arthritis: None    Previous cervical spine x-rays demonstrate Mild disc narrowing and marginal vertebral endplate and uncovertebral spurring are seen at the C5-6 level.  Examination is otherwise essentially unremarkable.     MRI left shoulder:   Impression:     Small full-thickness supraspinatus tendon insertional tear, background of mild tendinosis.     Nonspecific edema of the fat superficial to the rotator interval which is otherwise morphologically normal.  Correlate for symptoms of adhesive capsulitis.  Updated xrays left shoulder -  FINDINGS:  No acute displaced fracture, dislocation or suspicious osseous lesion. Anatomic alignment is maintained.     Regional soft tissues are grossly unremarkable.     Impression:     1. No acute displaced fracture, dislocation or suspicious osseous lesion.    ASSESSMENT: Left shoulder pain     PLAN:      1. Treatment options were discussed with the patient about shoulder.  I reviewed the MRI images with his and what this means for his shoulder.    We discussed both non-operative and operative options for his shoulder and the risks and benefits of each. Time  was given for questions to be asked and all concerns were answered.    He would like to go forward with surgery for his shoulder which I think is reasonable.  All specific risks and benefits were reviewed.    These risks include but are not limited to: bleeding, infection, scarring, re-tear of repair, irreparability of the tear, damage to neurovascular structures, damage to cartilage, stiffness, blood clots, pulmonary embolism, swelling, compartment syndrome, need for further surgery, and the risks of anesthesia.      He verbalized her understanding of these risks and wished to proceed with surgery.    Time was spent face-to-face with the patient during this encounter on counseling about treatment options including surgery and coordination of his care for preoperative visits, surgery and post-operative rehab.     The operative plan will be:    left   1. Arthroscopic rotator cuff repair  2. Arthroscopic subacromial decompression  3. Arthroscopic distal clavicle excision  4. Possible open biceps subpectoral tenodesis    Patient will  need medical clearance prior to the pre-operative appointment.    All questions were answered, patient will contact us for questions or concerns in the interim.          All questions were answered, patient will contact us for questions or concerns in the interim.

## 2022-08-17 ENCOUNTER — TELEPHONE (OUTPATIENT)
Dept: SPORTS MEDICINE | Facility: CLINIC | Age: 52
End: 2022-08-17
Payer: COMMERCIAL

## 2022-08-17 DIAGNOSIS — M75.122 COMPLETE ROTATOR CUFF TEAR OR RUPTURE OF LEFT SHOULDER, NOT SPECIFIED AS TRAUMATIC: Primary | ICD-10-CM

## 2022-08-17 DIAGNOSIS — M67.814 BICEPS TENDONOSIS OF LEFT SHOULDER: ICD-10-CM

## 2022-08-17 NOTE — TELEPHONE ENCOUNTER
Return call and LVM to schedule surgery.     ----- Message from Sachin Luevano sent at 8/17/2022 10:33 AM CDT -----  Regarding: FW: SURGERY  Contact: Self    ----- Message -----  From: Mary Lorenzo  Sent: 8/17/2022  10:26 AM CDT  To: [facility] Staff  Subject: SURGERY                                          Pt stated he would like to schedule surgery for either Sept or Oct of this year ask for a call      Contact [employer]   [phone] (home) [phone] (work)

## 2022-09-21 ENCOUNTER — OFFICE VISIT (OUTPATIENT)
Dept: INTERNAL MEDICINE | Facility: CLINIC | Age: 52
End: 2022-09-21
Payer: COMMERCIAL

## 2022-09-21 ENCOUNTER — LAB VISIT (OUTPATIENT)
Dept: LAB | Facility: HOSPITAL | Age: 52
End: 2022-09-21
Attending: INTERNAL MEDICINE
Payer: COMMERCIAL

## 2022-09-21 VITALS
BODY MASS INDEX: 25.66 KG/M2 | DIASTOLIC BLOOD PRESSURE: 70 MMHG | OXYGEN SATURATION: 97 % | SYSTOLIC BLOOD PRESSURE: 112 MMHG | HEIGHT: 68 IN | WEIGHT: 169.31 LBS | HEART RATE: 50 BPM

## 2022-09-21 DIAGNOSIS — Z01.810 PREOP CARDIOVASCULAR EXAM: Primary | ICD-10-CM

## 2022-09-21 DIAGNOSIS — M75.122 NONTRAUMATIC COMPLETE TEAR OF LEFT ROTATOR CUFF: ICD-10-CM

## 2022-09-21 DIAGNOSIS — E03.4 HYPOTHYROIDISM DUE TO ACQUIRED ATROPHY OF THYROID: ICD-10-CM

## 2022-09-21 DIAGNOSIS — Z01.810 PREOP CARDIOVASCULAR EXAM: ICD-10-CM

## 2022-09-21 LAB
ANION GAP SERPL CALC-SCNC: 7 MMOL/L (ref 8–16)
BASOPHILS # BLD AUTO: 0.02 K/UL (ref 0–0.2)
BASOPHILS NFR BLD: 0.3 % (ref 0–1.9)
BUN SERPL-MCNC: 7 MG/DL (ref 6–20)
CALCIUM SERPL-MCNC: 9.3 MG/DL (ref 8.7–10.5)
CHLORIDE SERPL-SCNC: 106 MMOL/L (ref 95–110)
CO2 SERPL-SCNC: 25 MMOL/L (ref 23–29)
CREAT SERPL-MCNC: 0.9 MG/DL (ref 0.5–1.4)
DIFFERENTIAL METHOD: ABNORMAL
EOSINOPHIL # BLD AUTO: 0.1 K/UL (ref 0–0.5)
EOSINOPHIL NFR BLD: 2.1 % (ref 0–8)
ERYTHROCYTE [DISTWIDTH] IN BLOOD BY AUTOMATED COUNT: 12.8 % (ref 11.5–14.5)
EST. GFR  (NO RACE VARIABLE): >60 ML/MIN/1.73 M^2
GLUCOSE SERPL-MCNC: 94 MG/DL (ref 70–110)
HCT VFR BLD AUTO: 43.8 % (ref 40–54)
HGB BLD-MCNC: 15.2 G/DL (ref 14–18)
IMM GRANULOCYTES # BLD AUTO: 0.01 K/UL (ref 0–0.04)
IMM GRANULOCYTES NFR BLD AUTO: 0.2 % (ref 0–0.5)
LYMPHOCYTES # BLD AUTO: 1.6 K/UL (ref 1–4.8)
LYMPHOCYTES NFR BLD: 26 % (ref 18–48)
MCH RBC QN AUTO: 31.3 PG (ref 27–31)
MCHC RBC AUTO-ENTMCNC: 34.7 G/DL (ref 32–36)
MCV RBC AUTO: 90 FL (ref 82–98)
MONOCYTES # BLD AUTO: 0.4 K/UL (ref 0.3–1)
MONOCYTES NFR BLD: 5.6 % (ref 4–15)
NEUTROPHILS # BLD AUTO: 4.2 K/UL (ref 1.8–7.7)
NEUTROPHILS NFR BLD: 65.8 % (ref 38–73)
NRBC BLD-RTO: 0 /100 WBC
PLATELET # BLD AUTO: 233 K/UL (ref 150–450)
PMV BLD AUTO: 10.1 FL (ref 9.2–12.9)
POTASSIUM SERPL-SCNC: 3.9 MMOL/L (ref 3.5–5.1)
RBC # BLD AUTO: 4.86 M/UL (ref 4.6–6.2)
SODIUM SERPL-SCNC: 138 MMOL/L (ref 136–145)
WBC # BLD AUTO: 6.3 K/UL (ref 3.9–12.7)

## 2022-09-21 PROCEDURE — 80048 BASIC METABOLIC PNL TOTAL CA: CPT | Performed by: INTERNAL MEDICINE

## 2022-09-21 PROCEDURE — 1159F PR MEDICATION LIST DOCUMENTED IN MEDICAL RECORD: ICD-10-PCS | Mod: CPTII,S$GLB,, | Performed by: INTERNAL MEDICINE

## 2022-09-21 PROCEDURE — 3074F SYST BP LT 130 MM HG: CPT | Mod: CPTII,S$GLB,, | Performed by: INTERNAL MEDICINE

## 2022-09-21 PROCEDURE — 36415 COLL VENOUS BLD VENIPUNCTURE: CPT | Performed by: INTERNAL MEDICINE

## 2022-09-21 PROCEDURE — 3074F PR MOST RECENT SYSTOLIC BLOOD PRESSURE < 130 MM HG: ICD-10-PCS | Mod: CPTII,S$GLB,, | Performed by: INTERNAL MEDICINE

## 2022-09-21 PROCEDURE — 3008F BODY MASS INDEX DOCD: CPT | Mod: CPTII,S$GLB,, | Performed by: INTERNAL MEDICINE

## 2022-09-21 PROCEDURE — 99999 PR PBB SHADOW E&M-EST. PATIENT-LVL III: ICD-10-PCS | Mod: PBBFAC,,, | Performed by: INTERNAL MEDICINE

## 2022-09-21 PROCEDURE — 3008F PR BODY MASS INDEX (BMI) DOCUMENTED: ICD-10-PCS | Mod: CPTII,S$GLB,, | Performed by: INTERNAL MEDICINE

## 2022-09-21 PROCEDURE — 99214 PR OFFICE/OUTPT VISIT, EST, LEVL IV, 30-39 MIN: ICD-10-PCS | Mod: S$GLB,,, | Performed by: INTERNAL MEDICINE

## 2022-09-21 PROCEDURE — 3078F PR MOST RECENT DIASTOLIC BLOOD PRESSURE < 80 MM HG: ICD-10-PCS | Mod: CPTII,S$GLB,, | Performed by: INTERNAL MEDICINE

## 2022-09-21 PROCEDURE — 93010 EKG 12-LEAD: ICD-10-PCS | Mod: S$GLB,,, | Performed by: INTERNAL MEDICINE

## 2022-09-21 PROCEDURE — 93005 EKG 12-LEAD: ICD-10-PCS | Mod: S$GLB,,, | Performed by: INTERNAL MEDICINE

## 2022-09-21 PROCEDURE — 93005 ELECTROCARDIOGRAM TRACING: CPT | Mod: S$GLB,,, | Performed by: INTERNAL MEDICINE

## 2022-09-21 PROCEDURE — 93010 ELECTROCARDIOGRAM REPORT: CPT | Mod: S$GLB,,, | Performed by: INTERNAL MEDICINE

## 2022-09-21 PROCEDURE — 99214 OFFICE O/P EST MOD 30 MIN: CPT | Mod: S$GLB,,, | Performed by: INTERNAL MEDICINE

## 2022-09-21 PROCEDURE — 85025 COMPLETE CBC W/AUTO DIFF WBC: CPT | Performed by: INTERNAL MEDICINE

## 2022-09-21 PROCEDURE — 1159F MED LIST DOCD IN RCRD: CPT | Mod: CPTII,S$GLB,, | Performed by: INTERNAL MEDICINE

## 2022-09-21 PROCEDURE — 99999 PR PBB SHADOW E&M-EST. PATIENT-LVL III: CPT | Mod: PBBFAC,,, | Performed by: INTERNAL MEDICINE

## 2022-09-21 PROCEDURE — 3078F DIAST BP <80 MM HG: CPT | Mod: CPTII,S$GLB,, | Performed by: INTERNAL MEDICINE

## 2022-09-21 NOTE — PROGRESS NOTES
Subjective:       Patient ID: Medardo Vizcaino III is a 51 y.o. male.   Chief Complaint: Pre-op Exam    HPI: Patient with stable on, comes in for preop for left shoulder rotator cuff surgery.  Will be under general anesthesia.    He denies any problems with anesthesia in past having had the right shoulder done previously.  He will be out of work at least 1 week.  He denies any abnormal bleeding bruising clotting.  No family history of any anesthesia problems.  No chest pain or shortness of breath.  No cough or wheeze  He does not take any regular steroids.  We reviewed holding anti-inflammatories aspirin and fish oil one week before.    Review of Systems   Constitutional:  Negative for chills, fatigue and fever.   HENT:  Negative for nosebleeds and trouble swallowing.    Eyes:  Negative for pain and visual disturbance.   Respiratory:  Negative for cough, shortness of breath and wheezing.    Cardiovascular:  Negative for chest pain and palpitations.   Gastrointestinal:  Negative for abdominal pain, constipation, diarrhea, nausea and vomiting.   Genitourinary:  Negative for difficulty urinating and hematuria.   Musculoskeletal:  Positive for arthralgias, back pain and leg pain. Negative for neck pain.   Integumentary:  Negative for rash.   Neurological:  Negative for dizziness and headaches.   Hematological:  Does not bruise/bleed easily.   Psychiatric/Behavioral:  Negative for dysphoric mood and sleep disturbance.         Objective:      Physical Exam  Constitutional:       General: He is not in acute distress.     Appearance: He is well-developed.   HENT:      Head: Normocephalic and atraumatic.      Right Ear: Tympanic membrane, ear canal and external ear normal.      Left Ear: Tympanic membrane, ear canal and external ear normal.      Mouth/Throat:      Pharynx: No oropharyngeal exudate or posterior oropharyngeal erythema.   Eyes:      General: No scleral icterus.     Conjunctiva/sclera: Conjunctivae normal.       "Pupils: Pupils are equal, round, and reactive to light.   Neck:      Thyroid: No thyromegaly.      Comments: No supraclavicular nodes palpated  Cardiovascular:      Rate and Rhythm: Normal rate and regular rhythm.      Pulses: Normal pulses.      Heart sounds: Normal heart sounds. No murmur heard.  Pulmonary:      Effort: Pulmonary effort is normal.      Breath sounds: Normal breath sounds. No wheezing.   Abdominal:      General: Bowel sounds are normal.      Palpations: Abdomen is soft. There is no mass.      Tenderness: There is no abdominal tenderness.   Musculoskeletal:         General: Tenderness present.      Cervical back: Normal range of motion and neck supple.      Right lower leg: No edema.      Left lower leg: No edema.   Lymphadenopathy:      Cervical: No cervical adenopathy.   Skin:     Coloration: Skin is not jaundiced or pale.   Neurological:      General: No focal deficit present.      Mental Status: He is alert and oriented to person, place, and time.   Psychiatric:         Mood and Affect: Mood normal.         Behavior: Behavior normal.       Assessment:       Problem List Items Addressed This Visit          Endocrine    Hypothyroidism due to acquired atrophy of thyroid       Orthopedic    Rotator cuff tear     Other Visit Diagnoses       Preop cardiovascular exam    -  Primary    Relevant Orders    EKG 12-lead    CBC Auto Differential    Basic Metabolic Panel              Plan:       Medardo was seen today for pre-op exam.    Diagnoses and all orders for this visit:    Preop cardiovascular exam  -     EKG 12-lead  -     CBC Auto Differential; Future  -     Basic Metabolic Panel; Future    Hypothyroidism due to acquired atrophy of thyroid    Nontraumatic complete tear of left rotator cuff           Review studies and give final determination of clearance      Portions of this note may have been created with voice recognition software. Occasional "wrong-word" or "sound-a-like" substitutions may " have occurred due to the inherent limitations of voice recognition software. Please, read the note carefully and recognize, using context, where substitutions have occurred.

## 2022-09-22 ENCOUNTER — TELEPHONE (OUTPATIENT)
Dept: INTERNAL MEDICINE | Facility: CLINIC | Age: 52
End: 2022-09-22
Payer: COMMERCIAL

## 2022-09-23 NOTE — TELEPHONE ENCOUNTER
Please let the pt know that he is cleared for surgery and then forward to Dr Jef Lawrence's staff to let them know the pt is cleared for surgery. Thanks.

## 2022-09-23 NOTE — TELEPHONE ENCOUNTER
Spoke with patient to relay clear for surgery message from PCP  Message sent to Dr Jef Lawrence staff    Copy of labwork placed in out going mail per patient request

## 2022-09-26 ENCOUNTER — PROCEDURE VISIT (OUTPATIENT)
Dept: NEUROLOGY | Facility: CLINIC | Age: 52
End: 2022-09-26
Payer: COMMERCIAL

## 2022-09-26 DIAGNOSIS — R20.0 NUMBNESS OF TOES: ICD-10-CM

## 2022-09-26 DIAGNOSIS — R20.2 PARESTHESIA OF BOTH LOWER EXTREMITIES: ICD-10-CM

## 2022-09-26 DIAGNOSIS — G60.9 IDIOPATHIC PERIPHERAL NEUROPATHY: ICD-10-CM

## 2022-09-26 PROCEDURE — 95886 PR EMG COMPLETE, W/ NERVE CONDUCTION STUDIES, 5+ MUSCLES: ICD-10-PCS | Mod: S$GLB,,, | Performed by: PHYSICAL MEDICINE & REHABILITATION

## 2022-09-26 PROCEDURE — 95910 NRV CNDJ TEST 7-8 STUDIES: CPT | Mod: S$GLB,,, | Performed by: PHYSICAL MEDICINE & REHABILITATION

## 2022-09-26 PROCEDURE — 95910 PR NERVE CONDUCTION STUDY; 7-8 STUDIES: ICD-10-PCS | Mod: S$GLB,,, | Performed by: PHYSICAL MEDICINE & REHABILITATION

## 2022-09-26 PROCEDURE — 95886 MUSC TEST DONE W/N TEST COMP: CPT | Mod: S$GLB,,, | Performed by: PHYSICAL MEDICINE & REHABILITATION

## 2022-09-26 NOTE — PROCEDURES
Test Date:  2022    Patient: Medardo Vizcaino : 1970 Physician: Rogelio Fang D.O.   ID#:  SEX: Male Ref. Phys: Elmer Griffith DPM     HPI: Medardo Vizcaino III is a 51 y.o.male who presents for NCS/EMG to evaluate left foot paresthesias.  Patient has abnormal left anterolateral leg and dorsal foot temperature sensations.  He has known history of lumbar issues, but MRI from  showed no foraminal stenosis.       NCV & EMG Findings:  All nerve conduction studies (as indicated in the following tables) were within normal limits.  Needle evaluation of the left Tibialis Anterior muscle showed increased motor unit duration and moderately increased polyphasic potentials.  All remaining muscles (as indicated in the following table) showed no evidence of electrical instability.    Impression:  This was a relatively normal electrophysiologic study of the lower extremities.  The left tibialis anterior showed some chronic neuropathic changes, but was otherwise normal.  In isolation to one muscle, this is of limited diagnostic significance.      ___________________________  Rogelio Fang D.O.        NCS+  Motor Nerve Results      Latency Amplitude F-Lat Segment Distance CV Comment   Site (ms) Norm (mV) Norm (ms)  (cm) (m/s) Norm    Left Fibular (EDB)   Ankle 4.1  < 6.5 2.3  > 1.10         Bel Fib Head 12.0 - 1.94 -  Bel Fib Head-Ankle 34 43  > 36    Pop Fossa 14.1 - 2.1 -  Pop Fossa-Bel Fib Head 13 62  > 42    Right Fibular (EDB)   Ankle 3.7  < 6.5 2.3  > 1.10         Bel Fib Head 11.2 - 2.2 -  Bel Fib Head-Ankle 36 48  > 36    Pop Fossa 13.1 - 1.86 -  Pop Fossa-Bel Fib Head 13 68  > 42    Left Tibial (AHB)   Ankle 4.6  < 6.1 13.0  > 5.3         Right Tibial (AHB)   Ankle 4.2  < 6.1 13.2  > 5.3           Sensory Nerve Results      Latency (Peak) Amplitude (P-P) Segment Distance CV Comment   Site (ms) Norm (µV) Norm  (cm) (m/s) Norm    Left Sural   Calf-Lat Mall 2.8  < 4.5 24  > 4 Calf-Lat Mall 15  54  > 35    Right Sural   Calf-Lat Mall 3.5  < 4.5 10  > 4 Calf-Lat Mall 16 46  > 35    Left Superficial Fibular   14 cm-Ankle 3.7  < 4.2 12  > 5 14 cm-Ankle 15 41  > 32    Right Superficial Fibular   14 cm-Ankle 3.8  < 4.2 16  > 5 14 cm-Ankle 15 39  > 32      EMG+     Side Muscle Nerve Root Ins Act Fibs Psw Amp Dur Poly Recrt Int Pat Comment   Left Vastus Med Femoral L2-L4 Nml Nml Nml Nml Nml 0 Nml Nml    Left Tib Anterior Fibular,  Deep Fibula... L4-L5 Nml Nml Nml Nml *>12ms *2+ Nml Nml    Left Fib Longus Fibular,  Superficial... L5-S1 Nml Nml Nml Nml Nml 0 Nml Nml    Left Gastroc Tibial S1-S2 Nml Nml Nml Nml Nml 0 Nml Nml    Left Dorsal Interossei ped I Lateral Plantar S2-S3 Nml Nml Nml Nml Nml 0 Nml Nml    Right Vastus Med Femoral L2-L4 Nml Nml Nml Nml Nml 0 Nml Nml    Right Tib Anterior Fibular,  Deep Fibula... L4-L5 Nml Nml Nml Nml Nml 0 Nml Nml    Right Fib Longus Fibular,  Superficial... L5-S1 Nml Nml Nml Nml Nml 0 Nml Nml    Right Gastroc Tibial S1-S2 Nml Nml Nml Nml Nml 0 Nml Nml    Right Dorsal Interossei ped I Lateral Plantar S2-S3 Nml Nml Nml Nml Nml 0 Nml Nml    Left Gluteus Med Sup Gluteal L5-S1 Nml Nml Nml Nml Nml 0 Nml Nml    Left Lumbo Parasp (Lower) Rami L5-S1 Nml Nml Nml                 Waveforms:    Motor              Sensory

## 2022-09-27 ENCOUNTER — OFFICE VISIT (OUTPATIENT)
Dept: PODIATRY | Facility: CLINIC | Age: 52
End: 2022-09-27
Payer: COMMERCIAL

## 2022-09-27 VITALS
HEART RATE: 48 BPM | SYSTOLIC BLOOD PRESSURE: 124 MMHG | WEIGHT: 169.31 LBS | BODY MASS INDEX: 25.74 KG/M2 | DIASTOLIC BLOOD PRESSURE: 79 MMHG

## 2022-09-27 DIAGNOSIS — M25.672 STIFFNESS OF ANKLE JOINT, LEFT: ICD-10-CM

## 2022-09-27 DIAGNOSIS — R29.898 ANKLE WEAKNESS: Primary | ICD-10-CM

## 2022-09-27 PROCEDURE — 3074F SYST BP LT 130 MM HG: CPT | Mod: CPTII,S$GLB,, | Performed by: PODIATRIST

## 2022-09-27 PROCEDURE — 3008F PR BODY MASS INDEX (BMI) DOCUMENTED: ICD-10-PCS | Mod: CPTII,S$GLB,, | Performed by: PODIATRIST

## 2022-09-27 PROCEDURE — 3074F PR MOST RECENT SYSTOLIC BLOOD PRESSURE < 130 MM HG: ICD-10-PCS | Mod: CPTII,S$GLB,, | Performed by: PODIATRIST

## 2022-09-27 PROCEDURE — 99999 PR PBB SHADOW E&M-EST. PATIENT-LVL III: ICD-10-PCS | Mod: PBBFAC,,, | Performed by: PODIATRIST

## 2022-09-27 PROCEDURE — 99213 PR OFFICE/OUTPT VISIT, EST, LEVL III, 20-29 MIN: ICD-10-PCS | Mod: S$GLB,,, | Performed by: PODIATRIST

## 2022-09-27 PROCEDURE — 3078F DIAST BP <80 MM HG: CPT | Mod: CPTII,S$GLB,, | Performed by: PODIATRIST

## 2022-09-27 PROCEDURE — 99213 OFFICE O/P EST LOW 20 MIN: CPT | Mod: S$GLB,,, | Performed by: PODIATRIST

## 2022-09-27 PROCEDURE — 99999 PR PBB SHADOW E&M-EST. PATIENT-LVL III: CPT | Mod: PBBFAC,,, | Performed by: PODIATRIST

## 2022-09-27 PROCEDURE — 1160F RVW MEDS BY RX/DR IN RCRD: CPT | Mod: CPTII,S$GLB,, | Performed by: PODIATRIST

## 2022-09-27 PROCEDURE — 3008F BODY MASS INDEX DOCD: CPT | Mod: CPTII,S$GLB,, | Performed by: PODIATRIST

## 2022-09-27 PROCEDURE — 1159F MED LIST DOCD IN RCRD: CPT | Mod: CPTII,S$GLB,, | Performed by: PODIATRIST

## 2022-09-27 PROCEDURE — 1159F PR MEDICATION LIST DOCUMENTED IN MEDICAL RECORD: ICD-10-PCS | Mod: CPTII,S$GLB,, | Performed by: PODIATRIST

## 2022-09-27 PROCEDURE — 1160F PR REVIEW ALL MEDS BY PRESCRIBER/CLIN PHARMACIST DOCUMENTED: ICD-10-PCS | Mod: CPTII,S$GLB,, | Performed by: PODIATRIST

## 2022-09-27 PROCEDURE — 3078F PR MOST RECENT DIASTOLIC BLOOD PRESSURE < 80 MM HG: ICD-10-PCS | Mod: CPTII,S$GLB,, | Performed by: PODIATRIST

## 2022-09-27 RX ORDER — DICLOFENAC SODIUM 10 MG/G
2 GEL TOPICAL 3 TIMES DAILY
Qty: 100 G | Refills: 3 | Status: SHIPPED | OUTPATIENT
Start: 2022-09-27 | End: 2023-01-03

## 2022-09-27 NOTE — PROGRESS NOTES
Subjective:      Patient ID: Medardo Vizcaino III is a 51 y.o. male.    Chief Complaint: Follow-up (Nerve study )    Medardo is a 51 y.o. male who presents to the podiatry clinic  with complaint of  left foot pain and numbness. Onset of the symptoms was several years ago. Precipitating event: none known. Current symptoms include: ability to bear weight, but with some pain, worsening symptoms after a period of activity and numbness and burning pain in left foot and ankle . Aggravating factors:  any weightbearing, hiking, walking long distances, biking . Symptoms have progressed to a point and plateaued. Patient has had prior foot problems. Evaluation to date: none. Treatment to date: rest and shoe changes . Patients rates pain 3/10 on pain scale.  Also gets general stiffness in left foot and ankle if he is sitting for a while (works from home) and gets up.     09/27/2022: follow up for L ankle pain and numbness. Had EMG/NCS done which only showed mild weakness of the TA tendon otherwise normal nerve test. States he found and got back into 10-year-old custom orthotics which feel great and his pain has gone away 100%.  His numbness has also improved.  Not having any other issues.  Was not able to get his Voltaren gel prescription filled (incorrect pharmacy) and would like a printed out prescription for this in case he has any flare-ups.  No other pedal concerns at this time.        Vitals:    09/27/22 1416   BP: 124/79   Pulse: (!) 48   Weight: 76.8 kg (169 lb 5 oz)   PainSc: 0-No pain       Review of Systems   Constitutional: Negative for chills and fever.   Cardiovascular:  Negative for chest pain, claudication and leg swelling.   Respiratory:  Negative for cough and shortness of breath.    Skin:  Negative for dry skin and nail changes.   Musculoskeletal:  Positive for arthritis, joint pain, myalgias and stiffness.   Gastrointestinal:  Negative for nausea and vomiting.   Neurological:  Positive for numbness,  paresthesias and sensory change.   Psychiatric/Behavioral:  Negative for altered mental status.          Objective:      Physical Exam  Vitals reviewed.   Constitutional:       Appearance: He is well-developed.   HENT:      Head: Normocephalic.   Cardiovascular:      Pulses:           Dorsalis pedis pulses are 2+ on the right side and 2+ on the left side.        Posterior tibial pulses are 2+ on the right side and 2+ on the left side.      Comments: CRT < 3 sec to tips of toes. No edema noted to b/l LE. No vericosities noted to b/l LEs.     Pulmonary:      Effort: No respiratory distress.   Musculoskeletal:      Comments: Equinus contracture noted to b/l ankles with knee straight and slightly improved with knee bent.  General stiffness of pedal and ankle joints b/l, improved.  No reproducible pain with palpation or range of motion of left ankle or foot.  Otherwise rectus foot and toe position bilateral with no major deformities noted. Mild equinus noted b/l ankles with < 10 deg DF noted. MMT 5/5 in DF/PF/Inv/Ev resistance with no reproduction of pain in any direction. Passive range of motion of ankle and pedal joints is painless b/l.     Skin:     General: Skin is warm and dry.      Findings: No erythema.      Comments: No open lesions, lacerations or wounds noted. Nails are normal to R 1-5 and L 1-5. Interdigital spaces clean, dry and intact b/l. No erythema noted to b/l foot. Skin texture normal. Pedal hair normal. Skin temperature normal b/l foot.      Neurological:      Mental Status: He is alert and oriented to person, place, and time.      Sensory: Sensory deficit present.      Comments: Light touch, proprioception, and sharp/dull sensation are all intact bilaterally. Protective threshold with the Seaford-Wienstein monofilament is intact bilaterally. Subjective paresthesias with no clearly identifiable source or trigger.    Psychiatric:         Behavior: Behavior normal.         Thought Content: Thought content  normal.         Judgment: Judgment normal.             Assessment:       Encounter Diagnoses   Name Primary?    Ankle weakness Yes    Stiffness of ankle joint, left            Plan:       Medardo was seen today for follow-up.    Diagnoses and all orders for this visit:    Ankle weakness    Stiffness of ankle joint, left    Other orders  -     diclofenac sodium (VOLTAREN) 1 % Gel; Apply 2 g topically 3 (three) times daily. Apply to the area of ankle pain 2-3x per day or night as needed      I counseled the patient on his conditions, their implications and medical management.    Overall significantly improved symptoms with the use of old custom orthotics.    Advised to continue to stretch calf muscles as directed multiple times daily for increased flexibility of ankles and to help decrease forefoot pressure over time.      Continue custom orthotics daily for long-term as directed.    Rx Voltaren gel to be applied to affected area up to 3-4 x daily as needed for pain.  Printed prescription provided.    Long discussion with patient regarding appropriate, supportive and comfortable shoes. Recommended supportive athletic shoe brands with adequate arch supports to alleviate abnormal pressure and improve stability of foot while walking. Avoid flat shoes and barefoot walking as these will exacerbate or worsen symptoms.     EMG/NCS reviewed noting no major abnormalities other than mild tibialis anterior weakness.  Discussed strength training of ankle to help improve this weakness.    RTC PRN.

## 2022-09-29 NOTE — ANESTHESIA PAT ROS NOTE
"                                                                                                             09/29/2022  Medardo Vizcaino III is a 51 y.o., male.  All information is gathered per Chart review via Epic system only.      Pre-op Assessment          Review of Systems  Anesthesia Hx:     Personal Hx of Anesthesia complications Pulmonary/Ventilatory Issues, laryngospasm, Pulmonary Edema, negative pressure pulmonary edema               Social:  Non-Smoker, Social Alcohol Use       EENT/Dental:   Eye irritation           Musculoskeletal:  Arthritis   Acromioclavicular Joint  Ankle Weakness            Endocrine:   Hypothyroidism          Dermatological:  Multiple Lipomas         Past Surgical History:   Procedure Laterality Date    EPIDURAL STEROID INJECTION N/A 2/9/2022    Procedure: Injection, Steroid, Epidural C7/T1  DIRECT REFERRAL;  Surgeon: Deon Strauss MD;  Location: [facility];  Service: Pain Management;  Laterality: N/A;    NASAL SEPTUM SURGERY      SHOULDER ARTHROSCOPY      SHOULDER SURGERY      TONSILLECTOMY      TUMOR EXCISION      arm; lipoma       Anesthesia Assessment: Preoperative EQUATION    Planned Procedure: Procedure(s) (LRB):  REPAIR, ROTATOR CUFF, ARTHROSCOPIC (Left)  ARTHROSCOPY, SHOULDER, WITH DISTAL CLAVICLE EXCISION (Left)  REPAIR,TENDON,DISTAL PROXIMAL (Left)  Requested Anesthesia Type:General  Surgeon: Jef Lawrence MD  Service: Orthopedics  Known or anticipated Date of Surgery:10/3/2022      Previous anesthesia records:GETA and Nerve block for post-op pain  3/2014  Mask Ventilation: Easy; Intubated: Postinduction; Blade: David #2; Airway Device Size: 7.5; Style:     Last PCP note: within 1 month , within [facility]   Pending PCP Clearance     5'8"  169 lbs  25.74 BMI  Vaccinated  "

## 2022-09-30 ENCOUNTER — ANESTHESIA EVENT (OUTPATIENT)
Dept: SURGERY | Facility: HOSPITAL | Age: 52
End: 2022-09-30
Payer: COMMERCIAL

## 2022-09-30 ENCOUNTER — OFFICE VISIT (OUTPATIENT)
Dept: SPORTS MEDICINE | Facility: CLINIC | Age: 52
End: 2022-09-30
Payer: COMMERCIAL

## 2022-09-30 VITALS
WEIGHT: 169 LBS | SYSTOLIC BLOOD PRESSURE: 125 MMHG | DIASTOLIC BLOOD PRESSURE: 80 MMHG | BODY MASS INDEX: 25.61 KG/M2 | HEIGHT: 68 IN

## 2022-09-30 DIAGNOSIS — M75.122 NONTRAUMATIC COMPLETE TEAR OF LEFT ROTATOR CUFF: Primary | ICD-10-CM

## 2022-09-30 PROCEDURE — 99999 PR PBB SHADOW E&M-EST. PATIENT-LVL IV: ICD-10-PCS | Mod: PBBFAC,,, | Performed by: PHYSICIAN ASSISTANT

## 2022-09-30 PROCEDURE — 3074F SYST BP LT 130 MM HG: CPT | Mod: CPTII,S$GLB,, | Performed by: PHYSICIAN ASSISTANT

## 2022-09-30 PROCEDURE — 3079F PR MOST RECENT DIASTOLIC BLOOD PRESSURE 80-89 MM HG: ICD-10-PCS | Mod: CPTII,S$GLB,, | Performed by: PHYSICIAN ASSISTANT

## 2022-09-30 PROCEDURE — 99999 PR PBB SHADOW E&M-EST. PATIENT-LVL IV: CPT | Mod: PBBFAC,,, | Performed by: PHYSICIAN ASSISTANT

## 2022-09-30 PROCEDURE — 1160F PR REVIEW ALL MEDS BY PRESCRIBER/CLIN PHARMACIST DOCUMENTED: ICD-10-PCS | Mod: CPTII,S$GLB,, | Performed by: PHYSICIAN ASSISTANT

## 2022-09-30 PROCEDURE — 99499 UNLISTED E&M SERVICE: CPT | Mod: S$GLB,,, | Performed by: PHYSICIAN ASSISTANT

## 2022-09-30 PROCEDURE — 1160F RVW MEDS BY RX/DR IN RCRD: CPT | Mod: CPTII,S$GLB,, | Performed by: PHYSICIAN ASSISTANT

## 2022-09-30 PROCEDURE — 3079F DIAST BP 80-89 MM HG: CPT | Mod: CPTII,S$GLB,, | Performed by: PHYSICIAN ASSISTANT

## 2022-09-30 PROCEDURE — 3008F PR BODY MASS INDEX (BMI) DOCUMENTED: ICD-10-PCS | Mod: CPTII,S$GLB,, | Performed by: PHYSICIAN ASSISTANT

## 2022-09-30 PROCEDURE — 99499 NO LOS: ICD-10-PCS | Mod: S$GLB,,, | Performed by: PHYSICIAN ASSISTANT

## 2022-09-30 PROCEDURE — 3074F PR MOST RECENT SYSTOLIC BLOOD PRESSURE < 130 MM HG: ICD-10-PCS | Mod: CPTII,S$GLB,, | Performed by: PHYSICIAN ASSISTANT

## 2022-09-30 PROCEDURE — 1159F PR MEDICATION LIST DOCUMENTED IN MEDICAL RECORD: ICD-10-PCS | Mod: CPTII,S$GLB,, | Performed by: PHYSICIAN ASSISTANT

## 2022-09-30 PROCEDURE — 3008F BODY MASS INDEX DOCD: CPT | Mod: CPTII,S$GLB,, | Performed by: PHYSICIAN ASSISTANT

## 2022-09-30 PROCEDURE — 1159F MED LIST DOCD IN RCRD: CPT | Mod: CPTII,S$GLB,, | Performed by: PHYSICIAN ASSISTANT

## 2022-09-30 RX ORDER — TRAMADOL HYDROCHLORIDE 50 MG/1
50 TABLET ORAL EVERY 6 HOURS PRN
Qty: 20 TABLET | Refills: 0 | Status: SHIPPED | OUTPATIENT
Start: 2022-09-30 | End: 2023-01-03

## 2022-09-30 RX ORDER — PROMETHAZINE HYDROCHLORIDE 25 MG/1
25 TABLET ORAL EVERY 6 HOURS PRN
Qty: 20 TABLET | Refills: 0 | Status: CANCELLED | OUTPATIENT
Start: 2022-09-30

## 2022-09-30 RX ORDER — ASPIRIN 325 MG
325 TABLET ORAL DAILY
Qty: 21 TABLET | Refills: 0 | Status: CANCELLED | OUTPATIENT
Start: 2022-09-30 | End: 2022-10-21

## 2022-09-30 RX ORDER — CEFAZOLIN SODIUM 2 G/50ML
2 SOLUTION INTRAVENOUS
Status: CANCELLED | OUTPATIENT
Start: 2022-09-30

## 2022-09-30 RX ORDER — SODIUM CHLORIDE 9 MG/ML
INJECTION, SOLUTION INTRAVENOUS CONTINUOUS
Status: CANCELLED | OUTPATIENT
Start: 2022-09-30

## 2022-09-30 RX ORDER — OXYCODONE AND ACETAMINOPHEN 10; 325 MG/1; MG/1
1 TABLET ORAL EVERY 6 HOURS PRN
Qty: 28 TABLET | Refills: 0 | Status: CANCELLED | OUTPATIENT
Start: 2022-09-30

## 2022-09-30 RX ORDER — ONDANSETRON 4 MG/1
4 TABLET, FILM COATED ORAL EVERY 8 HOURS PRN
Qty: 15 TABLET | Refills: 0 | Status: SHIPPED | OUTPATIENT
Start: 2022-09-30 | End: 2023-01-03

## 2022-09-30 NOTE — H&P
Medardo Vizcaino III  is here for a completion of his perioperative paperwork. he  Is scheduled to undergo:    left   1. Arthroscopic rotator cuff repair  2. Arthroscopic subacromial decompression  3. Arthroscopic distal clavicle excision  4. Possible open biceps subpectoral tenodesis    on 10/3/2022.      He is a healthy individual but does need clearance for this procedure.    Patient is cleared to proceed with surgery.      PAST MEDICAL HISTORY:   Past Medical History:   Diagnosis Date    Acromioclavicular joint arthritis 3/20/2014    Hypothyroidism      PAST SURGICAL HISTORY:   Past Surgical History:   Procedure Laterality Date    EPIDURAL STEROID INJECTION N/A 2/9/2022    Procedure: Injection, Steroid, Epidural C7/T1  DIRECT REFERRAL;  Surgeon: Deon Strauss MD;  Location: [facility];  Service: Pain Management;  Laterality: N/A;    NASAL SEPTUM SURGERY      SHOULDER ARTHROSCOPY      SHOULDER SURGERY      TONSILLECTOMY      TUMOR EXCISION      arm; lipoma     FAMILY HISTORY:   Family History   Problem Relation Age of Onset    Hypertension Father     Pacemaker/defibrilator Father         Pacemaker    Diabetes Maternal Grandmother     Diabetes Maternal Grandfather     Heart disease Paternal Grandfather     Arthritis Mother     Hypothyroidism Mother     Atrial fibrillation Mother     Hypothyroidism Sister     No Known Problems Paternal Grandmother     No Known Problems Maternal Aunt     No Known Problems Maternal Uncle     No Known Problems Paternal Aunt     No Known Problems Paternal Uncle     Amblyopia Neg Hx     Blindness Neg Hx     Cancer Neg Hx     Cataracts Neg Hx     Glaucoma Neg Hx     Macular degeneration Neg Hx     Retinal detachment Neg Hx     Strabismus Neg Hx     Stroke Neg Hx     Thyroid disease Neg Hx     Psoriasis Neg Hx     Inflammatory bowel disease Neg Hx     Lupus Neg Hx      SOCIAL HISTORY:   Social History     Socioeconomic History    Marital status: Unknown   Tobacco Use    Smoking  "status: Never    Smokeless tobacco: Never    Tobacco comments:     data analysis; ; no children   Substance and Sexual Activity    Alcohol use: Yes     Comment: one daily    Drug use: Yes     Types: Marijuana     Comment: daily since age 35; helps emotions and joint pain    Sexual activity: Yes     Partners: Female     Birth control/protection: Condom       MEDICATIONS:   Current Outpatient Medications:     diclofenac sodium (VOLTAREN) 1 % Gel, Apply 2 g topically 3 (three) times daily. Apply to the area of ankle pain 2-3x per day or night as needed, Disp: 100 g, Rfl: 3    levothyroxine (SYNTHROID) 75 MCG tablet, Take 1 tablet (75 mcg total) by mouth once daily., Disp: 90 tablet, Rfl: 0    omega-3 fatty acids/fish oil (FISH OIL-OMEGA-3 FATTY ACIDS) 300-1,000 mg capsule, Take by mouth once daily., Disp: , Rfl:     vitamin B complex (B COMPLEX-VITAMIN B12 ORAL), Take by mouth., Disp: , Rfl:     ondansetron (ZOFRAN) 4 MG tablet, Take 1 tablet (4 mg total) by mouth every 8 (eight) hours as needed for Nausea., Disp: 15 tablet, Rfl: 0    traMADoL (ULTRAM) 50 mg tablet, Take 1 tablet (50 mg total) by mouth every 6 (six) hours as needed for Pain., Disp: 20 tablet, Rfl: 0    triamterene-hydrochlorothiazide 75-50 mg (MAXZIDE) 75-50 mg per tablet, Take 1 tablet by mouth once daily. (Patient not taking: No sig reported), Disp: 90 tablet, Rfl: 1  ALLERGIES:   Review of patient's allergies indicates:   Allergen Reactions    Sulfa (sulfonamide antibiotics) Hives       VITAL SIGNS: /80   Ht 5' 8" (1.727 m)   Wt 76.7 kg (169 lb)   BMI 25.70 kg/m²      Risks, indications and benefits of the surgical procedure were discussed with the patient. All questions with regard to surgery, rehab, expected return to functional activities, activities of daily living and recreational endeavors were answered to his satisfaction.    It was explained to the patient that there may be an increase in surgical risks if the patient has " certain co-morbidities such as but not limited to: Obesity, Cardiovascular issues (CHF, CAD, Arrhythmias), chronic pulmonary issues, previous or current neurovascular/neurological issues, previous strokes, diabetes mellitus, previous wound healing issues, previous wound or skin infections, PVD, clotting disorders, if the patient uses chronic steroids, if the patient takes or has immune compromising medications or diseases, or has previously or currently used tobacco products.     The patient verbalized that he/she does not have any additional clotting, bleeding, or blood disorders, other than what is list in her chart on today's review.     Then a brief history and physical exam were performed.    Review of Systems   Constitution: Negative. Negative for chills, fever and night sweats.   HENT: Negative for congestion and headaches.    Eyes: Negative for blurred vision, left vision loss and right vision loss.   Cardiovascular: Negative for chest pain and syncope.   Respiratory: Negative for cough and shortness of breath.    Endocrine: Negative for polydipsia, polyphagia and polyuria.   Hematologic/Lymphatic: Negative for bleeding problem. Does not bruise/bleed easily.   Skin: Negative for dry skin, itching and rash.   Musculoskeletal: Negative for falls and muscle weakness.   Gastrointestinal: Negative for abdominal pain and bowel incontinence.   Genitourinary: Negative for bladder incontinence and nocturia.   Neurological: Negative for disturbances in coordination, loss of balance and seizures.   Psychiatric/Behavioral: Negative for depression. The patient does not have insomnia.    Allergic/Immunologic: Negative for hives and persistent infections.     PHYSICAL EXAM:  GEN: A&Ox3, WD WN NAD  HEENT: WNL  CHEST: CTAB, no W/R/R  HEART: RRR, no M/R/G  ABD: Soft, NT ND, BS x4 QUADS  MS; See Epic  NEURO: CN II-XII intact       The surgical consent was then reviewed with the patient, who agreed with all the contents of the  consent form and it was signed. he was then given the [facility] surgery packet to bring with him to surgery for the anesthesia portion of his perioperative paperwork.   For all physicians except for Dr. Lawrence, we will email and possibly fax the consent forms and booking sheets to [facility] pre-admit.    The patient was given the opportunity to ask questions about the surgical plan and consent form, and once no other questions were asked, I proceeded with the pre-op appointment.    PHYSICAL THERAPY:  He was also instructed regarding physical therapy and will begin on  POD#3-5 at the [facility]    POST OP CARE:instructions were reviewed including care of the wound and dressing after surgery and when he can shower.     CRUTCHES OR WALKER: It was explained to the patient that if they are having a lower extremity surgery that they will require either a walker or crutches to ambulate safely with after surgery. It was explained that a cane or other assistive devices are not sufficient to safely ambulate with after surgery. I explained to the patient that I will place an order for them to receive either crutches or a walker after surgery to go home with. It was explained that if they have crutches or a walker at home already, that they are REQUIRED to bring them to the hospital on the day of surgery. It was explained that if they do not have them at the hospital on the day of surgery that they WILL be provided a new pair or crutches or a walker to go home with to ensure ambulation will be safe if the patient needs to stop somewhere on the way home.      PAIN MANAGEMENT: PATIENT DECLINES TENS UNIT AND [facility] CARE    PAIN MEDICATION:  Percocet 10/325mg 1 po q 4-6 hours prn pain - patient has some left over from a previous surgery and request I not refill this  Ultram 50 mg Take 1-2 p.o. q.6 hours p.r.n. breakthrough pain,   Zofran 4 mg one p.o. q.8 hours p.r.n. nausea and vomiting.    The  patient was told that narcotic pain medications may make them drowsy and instructions were given to not sign legal documents, drive or operate heavy machinery, cars, or equipment while under the influence of narcotic medications. The patient was told and understands that narcotic pain medications should only be used as needed to control pain and that other options of pain control include TENs unit and ice packs/unit.     Patient was instructed to purchase and take Colace to counter possible GI side effects of taking opiates.     DVT prophylaxis was discussed with the patient today including risk factors for developing DVTs and history of DVTs. The patient was asked if any specific recommendations were given from the doctor/s that did pre-operative surgical clearance. The patient was then given an education sheet about DVTs and PE with warning signs and symptoms of both and steps to take if they suspect either of these.    Patient was asked if they were taking or using OCP pills or devices. If they answered yes, then they were instructed to stop using OCPs at this pre-operative appointment until 2 months post-op to help prevent DVT development. They understand that there are other forms of birth control that do not involve hormones. They expressed understanding that ignoring/not following this instruction could result in a DVT which could turn into a deadly pulmonary embolism.     This along with the Modified Caprini risk assessment model for VTE in general surgical patients was used to determine the patient's DVT risk.     From: Jennifer KEARNS, Jacob DA, Marina SM, et al. Prevention of VTE in nonorthopedic surgical patients: antithrombotic therapy and prevention of thrombosis, 9th ed: American College of [employer]icians evidence-based clinical practical guidelines. Chest 2012; 141:e227S. Copyright © 2012. Reproduced with permission from the American College of Chest Physicians.    The below listed DVT prophylaxis regimen  was discussed along with SCDs during surgery and bilateral KASEY compression stockings to be used post-op. Length of treatment has been determined to be 10-42 days post-op by the above noted Caprini assessment model. Early ambulation post-op was also discussed and emphasized with the patient.     The patient was instructed to buy and take:  Aspirin 81mg BID x 3 weeks for DVT prophylaxis starting on the morning after surgery.  Patient will also use bilateral TEDs on lower extremities, SCDs during surgery, and early ambulation post-op. If the patient was previously taking 81mg baby aspirin, they were told to not take it starting 5 days prior to surgery and to restart the 81mg aspirin after surgery.       Patient was also told to buy over the counter Prilosec medication if needed and take it once daily for GI protection as long as they are taking NSAIDs or Aspirin.      Patient denies history of seizures.     I explained to following and the patient expressed understanding:  The patient is currently aware of the COVID19 pandemic and that proceeding with their surgical procedure could potentially increase exposure to coronavirus in the community. The patient understands that there is the possibility of delayed or cancelled appts or PT visits in the future. They understand that infection with the coronavirus could complicate their surgery recovery. They are aware of the current policies and procedures of [facility] and the government regarding the pandemic and they were given the option of delaying my surgery. The patient elects to proceed with surgery at this time.       The patient was instructed to practice strict social distancing, hand washing/hygiene, respiratory hygiene, and cough etiquette from now until 6 weeks following surgery to reduce the risk of jamal coronavirus.    As there were no other questions to be asked, he was given my business card along with Jef Lawrence MD business card if he has any  questions or concerns prior to surgery or in the postop period.

## 2022-09-30 NOTE — H&P (VIEW-ONLY)
Medardo Vizcaino III  is here for a completion of his perioperative paperwork. he  Is scheduled to undergo:    left   1. Arthroscopic rotator cuff repair  2. Arthroscopic subacromial decompression  3. Arthroscopic distal clavicle excision  4. Possible open biceps subpectoral tenodesis    on 10/3/2022.      He is a healthy individual but does need clearance for this procedure.    Patient is cleared to proceed with surgery.      PAST MEDICAL HISTORY:   Past Medical History:   Diagnosis Date    Acromioclavicular joint arthritis 3/20/2014    Hypothyroidism      PAST SURGICAL HISTORY:   Past Surgical History:   Procedure Laterality Date    EPIDURAL STEROID INJECTION N/A 2/9/2022    Procedure: Injection, Steroid, Epidural C7/T1  DIRECT REFERRAL;  Surgeon: Deon Strauss MD;  Location: [facility];  Service: Pain Management;  Laterality: N/A;    NASAL SEPTUM SURGERY      SHOULDER ARTHROSCOPY      SHOULDER SURGERY      TONSILLECTOMY      TUMOR EXCISION      arm; lipoma     FAMILY HISTORY:   Family History   Problem Relation Age of Onset    Hypertension Father     Pacemaker/defibrilator Father         Pacemaker    Diabetes Maternal Grandmother     Diabetes Maternal Grandfather     Heart disease Paternal Grandfather     Arthritis Mother     Hypothyroidism Mother     Atrial fibrillation Mother     Hypothyroidism Sister     No Known Problems Paternal Grandmother     No Known Problems Maternal Aunt     No Known Problems Maternal Uncle     No Known Problems Paternal Aunt     No Known Problems Paternal Uncle     Amblyopia Neg Hx     Blindness Neg Hx     Cancer Neg Hx     Cataracts Neg Hx     Glaucoma Neg Hx     Macular degeneration Neg Hx     Retinal detachment Neg Hx     Strabismus Neg Hx     Stroke Neg Hx     Thyroid disease Neg Hx     Psoriasis Neg Hx     Inflammatory bowel disease Neg Hx     Lupus Neg Hx      SOCIAL HISTORY:   Social History     Socioeconomic History    Marital status: Unknown   Tobacco Use    Smoking  "status: Never    Smokeless tobacco: Never    Tobacco comments:     data analysis; ; no children   Substance and Sexual Activity    Alcohol use: Yes     Comment: one daily    Drug use: Yes     Types: Marijuana     Comment: daily since age 35; helps emotions and joint pain    Sexual activity: Yes     Partners: Female     Birth control/protection: Condom       MEDICATIONS:   Current Outpatient Medications:     diclofenac sodium (VOLTAREN) 1 % Gel, Apply 2 g topically 3 (three) times daily. Apply to the area of ankle pain 2-3x per day or night as needed, Disp: 100 g, Rfl: 3    levothyroxine (SYNTHROID) 75 MCG tablet, Take 1 tablet (75 mcg total) by mouth once daily., Disp: 90 tablet, Rfl: 0    omega-3 fatty acids/fish oil (FISH OIL-OMEGA-3 FATTY ACIDS) 300-1,000 mg capsule, Take by mouth once daily., Disp: , Rfl:     vitamin B complex (B COMPLEX-VITAMIN B12 ORAL), Take by mouth., Disp: , Rfl:     ondansetron (ZOFRAN) 4 MG tablet, Take 1 tablet (4 mg total) by mouth every 8 (eight) hours as needed for Nausea., Disp: 15 tablet, Rfl: 0    traMADoL (ULTRAM) 50 mg tablet, Take 1 tablet (50 mg total) by mouth every 6 (six) hours as needed for Pain., Disp: 20 tablet, Rfl: 0    triamterene-hydrochlorothiazide 75-50 mg (MAXZIDE) 75-50 mg per tablet, Take 1 tablet by mouth once daily. (Patient not taking: No sig reported), Disp: 90 tablet, Rfl: 1  ALLERGIES:   Review of patient's allergies indicates:   Allergen Reactions    Sulfa (sulfonamide antibiotics) Hives       VITAL SIGNS: /80   Ht 5' 8" (1.727 m)   Wt 76.7 kg (169 lb)   BMI 25.70 kg/m²      Risks, indications and benefits of the surgical procedure were discussed with the patient. All questions with regard to surgery, rehab, expected return to functional activities, activities of daily living and recreational endeavors were answered to his satisfaction.    It was explained to the patient that there may be an increase in surgical risks if the patient has " certain co-morbidities such as but not limited to: Obesity, Cardiovascular issues (CHF, CAD, Arrhythmias), chronic pulmonary issues, previous or current neurovascular/neurological issues, previous strokes, diabetes mellitus, previous wound healing issues, previous wound or skin infections, PVD, clotting disorders, if the patient uses chronic steroids, if the patient takes or has immune compromising medications or diseases, or has previously or currently used tobacco products.     The patient verbalized that he/she does not have any additional clotting, bleeding, or blood disorders, other than what is list in her chart on today's review.     Then a brief history and physical exam were performed.    Review of Systems   Constitution: Negative. Negative for chills, fever and night sweats.   HENT: Negative for congestion and headaches.    Eyes: Negative for blurred vision, left vision loss and right vision loss.   Cardiovascular: Negative for chest pain and syncope.   Respiratory: Negative for cough and shortness of breath.    Endocrine: Negative for polydipsia, polyphagia and polyuria.   Hematologic/Lymphatic: Negative for bleeding problem. Does not bruise/bleed easily.   Skin: Negative for dry skin, itching and rash.   Musculoskeletal: Negative for falls and muscle weakness.   Gastrointestinal: Negative for abdominal pain and bowel incontinence.   Genitourinary: Negative for bladder incontinence and nocturia.   Neurological: Negative for disturbances in coordination, loss of balance and seizures.   Psychiatric/Behavioral: Negative for depression. The patient does not have insomnia.    Allergic/Immunologic: Negative for hives and persistent infections.     PHYSICAL EXAM:  GEN: A&Ox3, WD WN NAD  HEENT: WNL  CHEST: CTAB, no W/R/R  HEART: RRR, no M/R/G  ABD: Soft, NT ND, BS x4 QUADS  MS; See Epic  NEURO: CN II-XII intact       The surgical consent was then reviewed with the patient, who agreed with all the contents of the  consent form and it was signed. he was then given the [facility] surgery packet to bring with him to surgery for the anesthesia portion of his perioperative paperwork.   For all physicians except for Dr. Lawrence, we will email and possibly fax the consent forms and booking sheets to [facility] pre-admit.    The patient was given the opportunity to ask questions about the surgical plan and consent form, and once no other questions were asked, I proceeded with the pre-op appointment.    PHYSICAL THERAPY:  He was also instructed regarding physical therapy and will begin on  POD#3-5 at the [facility]    POST OP CARE:instructions were reviewed including care of the wound and dressing after surgery and when he can shower.     CRUTCHES OR WALKER: It was explained to the patient that if they are having a lower extremity surgery that they will require either a walker or crutches to ambulate safely with after surgery. It was explained that a cane or other assistive devices are not sufficient to safely ambulate with after surgery. I explained to the patient that I will place an order for them to receive either crutches or a walker after surgery to go home with. It was explained that if they have crutches or a walker at home already, that they are REQUIRED to bring them to the hospital on the day of surgery. It was explained that if they do not have them at the hospital on the day of surgery that they WILL be provided a new pair or crutches or a walker to go home with to ensure ambulation will be safe if the patient needs to stop somewhere on the way home.      PAIN MANAGEMENT: PATIENT DECLINES TENS UNIT AND [facility] CARE    PAIN MEDICATION:  Percocet 10/325mg 1 po q 4-6 hours prn pain - patient has some left over from a previous surgery and request I not refill this  Ultram 50 mg Take 1-2 p.o. q.6 hours p.r.n. breakthrough pain,   Zofran 4 mg one p.o. q.8 hours p.r.n. nausea and vomiting.    The  patient was told that narcotic pain medications may make them drowsy and instructions were given to not sign legal documents, drive or operate heavy machinery, cars, or equipment while under the influence of narcotic medications. The patient was told and understands that narcotic pain medications should only be used as needed to control pain and that other options of pain control include TENs unit and ice packs/unit.     Patient was instructed to purchase and take Colace to counter possible GI side effects of taking opiates.     DVT prophylaxis was discussed with the patient today including risk factors for developing DVTs and history of DVTs. The patient was asked if any specific recommendations were given from the doctor/s that did pre-operative surgical clearance. The patient was then given an education sheet about DVTs and PE with warning signs and symptoms of both and steps to take if they suspect either of these.    Patient was asked if they were taking or using OCP pills or devices. If they answered yes, then they were instructed to stop using OCPs at this pre-operative appointment until 2 months post-op to help prevent DVT development. They understand that there are other forms of birth control that do not involve hormones. They expressed understanding that ignoring/not following this instruction could result in a DVT which could turn into a deadly pulmonary embolism.     This along with the Modified Caprini risk assessment model for VTE in general surgical patients was used to determine the patient's DVT risk.     From: Jennifer KEARNS, Jacob DA, Marina SM, et al. Prevention of VTE in nonorthopedic surgical patients: antithrombotic therapy and prevention of thrombosis, 9th ed: American College of Chest Physicians evidence-based clinical practical guidelines. Chest 2012; 141:e227S. Copyright © 2012. Reproduced with permission from the American College of Chest Physicians.    The below listed DVT prophylaxis regimen  was discussed along with SCDs during surgery and bilateral KASEY compression stockings to be used post-op. Length of treatment has been determined to be 10-42 days post-op by the above noted Caprini assessment model. Early ambulation post-op was also discussed and emphasized with the patient.     The patient was instructed to buy and take:  Aspirin 81mg BID x 3 weeks for DVT prophylaxis starting on the morning after surgery.  Patient will also use bilateral TEDs on lower extremities, SCDs during surgery, and early ambulation post-op. If the patient was previously taking 81mg baby aspirin, they were told to not take it starting 5 days prior to surgery and to restart the 81mg aspirin after surgery.       Patient was also told to buy over the counter Prilosec medication if needed and take it once daily for GI protection as long as they are taking NSAIDs or Aspirin.      Patient denies history of seizures.     I explained to following and the patient expressed understanding:  The patient is currently aware of the COVID19 pandemic and that proceeding with their surgical procedure could potentially increase exposure to coronavirus in the community. The patient understands that there is the possibility of delayed or cancelled appts or PT visits in the future. They understand that infection with the coronavirus could complicate their surgery recovery. They are aware of the current policies and procedures of [facility] and the government regarding the pandemic and they were given the option of delaying my surgery. The patient elects to proceed with surgery at this time.       The patient was instructed to practice strict social distancing, hand washing/hygiene, respiratory hygiene, and cough etiquette from now until 6 weeks following surgery to reduce the risk of jamal coronavirus.    As there were no other questions to be asked, he was given my business card along with Jef Lawrence MD business card if he has any  questions or concerns prior to surgery or in the postop period.

## 2022-10-03 ENCOUNTER — HOSPITAL ENCOUNTER (OUTPATIENT)
Facility: HOSPITAL | Age: 52
Discharge: HOME | End: 2022-10-03
Attending: ORTHOPAEDIC SURGERY | Admitting: ORTHOPAEDIC SURGERY
Payer: COMMERCIAL

## 2022-10-03 ENCOUNTER — ANESTHESIA (OUTPATIENT)
Dept: SURGERY | Facility: HOSPITAL | Age: 52
End: 2022-10-03
Payer: COMMERCIAL

## 2022-10-03 DIAGNOSIS — M75.122 NONTRAUMATIC COMPLETE TEAR OF LEFT ROTATOR CUFF: ICD-10-CM

## 2022-10-03 PROCEDURE — 25000003 PHARM REV CODE 250: Performed by: NURSE ANESTHETIST, CERTIFIED REGISTERED

## 2022-10-03 PROCEDURE — C1713 ANCHOR/SCREW BN/BN,TIS/BN: HCPCS | Performed by: ORTHOPAEDIC SURGERY

## 2022-10-03 PROCEDURE — 64416 NJX AA&/STRD BRCH PL NFS IMG: CPT | Mod: 59,LT,, | Performed by: SURGERY

## 2022-10-03 PROCEDURE — D9220A PRA ANESTHESIA: Mod: ANES,,, | Performed by: ANESTHESIOLOGY

## 2022-10-03 PROCEDURE — 94761 N-INVAS EAR/PLS OXIMETRY MLT: CPT

## 2022-10-03 PROCEDURE — 29824 SHO ARTHRS SRG DSTL CLAVICLC: CPT | Mod: 51,LT,, | Performed by: ORTHOPAEDIC SURGERY

## 2022-10-03 PROCEDURE — 29826 SHO ARTHRS SRG DECOMPRESSION: CPT | Mod: LT,,, | Performed by: ORTHOPAEDIC SURGERY

## 2022-10-03 PROCEDURE — 64450 NJX AA&/STRD OTHER PN/BRANCH: CPT | Mod: 59,LT,, | Performed by: SURGERY

## 2022-10-03 PROCEDURE — 76942 LEFT INTERSCALENE CATHETER: ICD-10-PCS | Mod: 26,,, | Performed by: SURGERY

## 2022-10-03 PROCEDURE — 29827 SHO ARTHRS SRG RT8TR CUF RPR: CPT | Mod: LT,,, | Performed by: ORTHOPAEDIC SURGERY

## 2022-10-03 PROCEDURE — 63600175 PHARM REV CODE 636 W HCPCS: Performed by: PHYSICIAN ASSISTANT

## 2022-10-03 PROCEDURE — 76942 ECHO GUIDE FOR BIOPSY: CPT | Performed by: SURGERY

## 2022-10-03 PROCEDURE — D9220A PRA ANESTHESIA: ICD-10-PCS | Mod: CRNA,,, | Performed by: NURSE ANESTHETIST, CERTIFIED REGISTERED

## 2022-10-03 PROCEDURE — 63600175 PHARM REV CODE 636 W HCPCS: Performed by: ORTHOPAEDIC SURGERY

## 2022-10-03 PROCEDURE — 29826 PR SHLDR ARTHROSCOP,PART ACROMIOPLAS: ICD-10-PCS | Mod: LT,,, | Performed by: ORTHOPAEDIC SURGERY

## 2022-10-03 PROCEDURE — 29827 PR SHLDR ARTHROSCOP,SURG,W/ROTAT CUFF REPR: ICD-10-PCS | Mod: LT,,, | Performed by: ORTHOPAEDIC SURGERY

## 2022-10-03 PROCEDURE — 29824 PR SHLDR ARTHROSCOP,SURG,DIS CLAVICULECTOMY: ICD-10-PCS | Mod: 51,LT,, | Performed by: ORTHOPAEDIC SURGERY

## 2022-10-03 PROCEDURE — 25000003 PHARM REV CODE 250: Performed by: ORTHOPAEDIC SURGERY

## 2022-10-03 PROCEDURE — 71000033 HC RECOVERY, INTIAL HOUR: Performed by: ORTHOPAEDIC SURGERY

## 2022-10-03 PROCEDURE — 76942 ECHO GUIDE FOR BIOPSY: CPT | Mod: 26,,, | Performed by: SURGERY

## 2022-10-03 PROCEDURE — 63600175 PHARM REV CODE 636 W HCPCS: Performed by: ANESTHESIOLOGY

## 2022-10-03 PROCEDURE — 36000711: Performed by: ORTHOPAEDIC SURGERY

## 2022-10-03 PROCEDURE — 37000009 HC ANESTHESIA EA ADD 15 MINS: Performed by: ORTHOPAEDIC SURGERY

## 2022-10-03 PROCEDURE — 25000003 PHARM REV CODE 250: Performed by: PHYSICIAN ASSISTANT

## 2022-10-03 PROCEDURE — D9220A PRA ANESTHESIA: ICD-10-PCS | Mod: ANES,,, | Performed by: ANESTHESIOLOGY

## 2022-10-03 PROCEDURE — 23430 PR REPAIR BICEPS LONG TENDON: ICD-10-PCS | Mod: 51,LT,, | Performed by: ORTHOPAEDIC SURGERY

## 2022-10-03 PROCEDURE — 64450: ICD-10-PCS | Mod: 59,LT,, | Performed by: SURGERY

## 2022-10-03 PROCEDURE — 71000039 HC RECOVERY, EACH ADD'L HOUR: Performed by: ORTHOPAEDIC SURGERY

## 2022-10-03 PROCEDURE — 36000710: Performed by: ORTHOPAEDIC SURGERY

## 2022-10-03 PROCEDURE — 64416 LEFT INTERSCALENE CATHETER: ICD-10-PCS | Mod: 59,LT,, | Performed by: SURGERY

## 2022-10-03 PROCEDURE — 25000003 PHARM REV CODE 250: Performed by: ANESTHESIOLOGY

## 2022-10-03 PROCEDURE — 63600175 PHARM REV CODE 636 W HCPCS: Performed by: NURSE ANESTHETIST, CERTIFIED REGISTERED

## 2022-10-03 PROCEDURE — 71000015 HC POSTOP RECOV 1ST HR: Performed by: ORTHOPAEDIC SURGERY

## 2022-10-03 PROCEDURE — 37000008 HC ANESTHESIA 1ST 15 MINUTES: Performed by: ORTHOPAEDIC SURGERY

## 2022-10-03 PROCEDURE — 27201423 OPTIME MED/SURG SUP & DEVICES STERILE SUPPLY: Performed by: ORTHOPAEDIC SURGERY

## 2022-10-03 PROCEDURE — 63600175 PHARM REV CODE 636 W HCPCS: Performed by: STUDENT IN AN ORGANIZED HEALTH CARE EDUCATION/TRAINING PROGRAM

## 2022-10-03 PROCEDURE — 23430 REPAIR BICEPS TENDON: CPT | Mod: 51,LT,, | Performed by: ORTHOPAEDIC SURGERY

## 2022-10-03 PROCEDURE — D9220A PRA ANESTHESIA: Mod: CRNA,,, | Performed by: NURSE ANESTHETIST, CERTIFIED REGISTERED

## 2022-10-03 PROCEDURE — 99900035 HC TECH TIME PER 15 MIN (STAT)

## 2022-10-03 PROCEDURE — 25000003 PHARM REV CODE 250: Performed by: STUDENT IN AN ORGANIZED HEALTH CARE EDUCATION/TRAINING PROGRAM

## 2022-10-03 DEVICE — SYS IMPL PROXIMAL TENODESIS: Type: IMPLANTABLE DEVICE | Site: SHOULDER | Status: FUNCTIONAL

## 2022-10-03 DEVICE — ANCHOR HEALIX 5.5 ADV BR3: Type: IMPLANTABLE DEVICE | Site: SHOULDER | Status: FUNCTIONAL

## 2022-10-03 RX ORDER — FENTANYL CITRATE 50 UG/ML
25-200 INJECTION, SOLUTION INTRAMUSCULAR; INTRAVENOUS
Status: DISCONTINUED | OUTPATIENT
Start: 2022-10-03 | End: 2022-10-03 | Stop reason: HOSPADM

## 2022-10-03 RX ORDER — BUPIVACAINE HYDROCHLORIDE 2.5 MG/ML
INJECTION, SOLUTION EPIDURAL; INFILTRATION; INTRACAUDAL
Status: DISCONTINUED | OUTPATIENT
Start: 2022-10-03 | End: 2022-10-03 | Stop reason: HOSPADM

## 2022-10-03 RX ORDER — FENTANYL CITRATE 50 UG/ML
INJECTION, SOLUTION INTRAMUSCULAR; INTRAVENOUS
Status: DISCONTINUED | OUTPATIENT
Start: 2022-10-03 | End: 2022-10-03

## 2022-10-03 RX ORDER — ROPIVACAINE HYDROCHLORIDE 2 MG/ML
INJECTION, SOLUTION EPIDURAL; INFILTRATION; PERINEURAL CONTINUOUS
Status: DISCONTINUED | OUTPATIENT
Start: 2022-10-03 | End: 2022-10-03 | Stop reason: HOSPADM

## 2022-10-03 RX ORDER — DEXAMETHASONE SODIUM PHOSPHATE 4 MG/ML
INJECTION, SOLUTION INTRA-ARTICULAR; INTRALESIONAL; INTRAMUSCULAR; INTRAVENOUS; SOFT TISSUE
Status: DISCONTINUED | OUTPATIENT
Start: 2022-10-03 | End: 2022-10-03

## 2022-10-03 RX ORDER — CEFAZOLIN SODIUM 1 G/3ML
2 INJECTION, POWDER, FOR SOLUTION INTRAMUSCULAR; INTRAVENOUS
Status: COMPLETED | OUTPATIENT
Start: 2022-10-03 | End: 2022-10-03

## 2022-10-03 RX ORDER — ONDANSETRON 2 MG/ML
INJECTION INTRAMUSCULAR; INTRAVENOUS
Status: DISCONTINUED | OUTPATIENT
Start: 2022-10-03 | End: 2022-10-03

## 2022-10-03 RX ORDER — PROPOFOL 10 MG/ML
VIAL (ML) INTRAVENOUS
Status: DISCONTINUED | OUTPATIENT
Start: 2022-10-03 | End: 2022-10-03

## 2022-10-03 RX ORDER — FAMOTIDINE 10 MG/ML
INJECTION INTRAVENOUS
Status: DISCONTINUED | OUTPATIENT
Start: 2022-10-03 | End: 2022-10-03

## 2022-10-03 RX ORDER — CELECOXIB 200 MG/1
400 CAPSULE ORAL ONCE
Status: DISCONTINUED | OUTPATIENT
Start: 2022-10-03 | End: 2022-10-03 | Stop reason: HOSPADM

## 2022-10-03 RX ORDER — KETAMINE HCL IN 0.9 % NACL 50 MG/5 ML
SYRINGE (ML) INTRAVENOUS
Status: DISCONTINUED | OUTPATIENT
Start: 2022-10-03 | End: 2022-10-03

## 2022-10-03 RX ORDER — ROPIVACAINE HYDROCHLORIDE 2 MG/ML
INJECTION, SOLUTION EPIDURAL; INFILTRATION; PERINEURAL CONTINUOUS
Status: DISCONTINUED | OUTPATIENT
Start: 2022-10-03 | End: 2022-10-03

## 2022-10-03 RX ORDER — METOCLOPRAMIDE HYDROCHLORIDE 5 MG/ML
10 INJECTION INTRAMUSCULAR; INTRAVENOUS EVERY 10 MIN PRN
Status: DISCONTINUED | OUTPATIENT
Start: 2022-10-03 | End: 2022-10-03 | Stop reason: HOSPADM

## 2022-10-03 RX ORDER — HALOPERIDOL 5 MG/ML
0.5 INJECTION INTRAMUSCULAR EVERY 10 MIN PRN
Status: DISCONTINUED | OUTPATIENT
Start: 2022-10-03 | End: 2022-10-03 | Stop reason: HOSPADM

## 2022-10-03 RX ORDER — SODIUM CHLORIDE 0.9 % (FLUSH) 0.9 %
10 SYRINGE (ML) INJECTION
Status: DISCONTINUED | OUTPATIENT
Start: 2022-10-03 | End: 2022-10-03 | Stop reason: HOSPADM

## 2022-10-03 RX ORDER — EPHEDRINE SULFATE 50 MG/ML
INJECTION, SOLUTION INTRAVENOUS
Status: DISCONTINUED | OUTPATIENT
Start: 2022-10-03 | End: 2022-10-03

## 2022-10-03 RX ORDER — EPINEPHRINE 1 MG/ML
INJECTION, SOLUTION INTRACARDIAC; INTRAMUSCULAR; INTRAVENOUS; SUBCUTANEOUS
Status: DISCONTINUED | OUTPATIENT
Start: 2022-10-03 | End: 2022-10-03 | Stop reason: HOSPADM

## 2022-10-03 RX ORDER — MIDAZOLAM HYDROCHLORIDE 1 MG/ML
.5-4 INJECTION INTRAMUSCULAR; INTRAVENOUS
Status: DISCONTINUED | OUTPATIENT
Start: 2022-10-03 | End: 2022-10-03 | Stop reason: HOSPADM

## 2022-10-03 RX ORDER — BUPIVACAINE HYDROCHLORIDE 2.5 MG/ML
INJECTION, SOLUTION EPIDURAL; INFILTRATION; INTRACAUDAL
Status: COMPLETED | OUTPATIENT
Start: 2022-10-03 | End: 2022-10-03

## 2022-10-03 RX ORDER — MIDAZOLAM HYDROCHLORIDE 1 MG/ML
INJECTION, SOLUTION INTRAMUSCULAR; INTRAVENOUS
Status: DISCONTINUED | OUTPATIENT
Start: 2022-10-03 | End: 2022-10-03

## 2022-10-03 RX ORDER — LIDOCAINE HYDROCHLORIDE 20 MG/ML
INJECTION INTRAVENOUS
Status: DISCONTINUED | OUTPATIENT
Start: 2022-10-03 | End: 2022-10-03

## 2022-10-03 RX ORDER — ACETAMINOPHEN 500 MG
1000 TABLET ORAL
Status: COMPLETED | OUTPATIENT
Start: 2022-10-03 | End: 2022-10-03

## 2022-10-03 RX ORDER — HYDROMORPHONE HYDROCHLORIDE 1 MG/ML
0.2 INJECTION, SOLUTION INTRAMUSCULAR; INTRAVENOUS; SUBCUTANEOUS EVERY 5 MIN PRN
Status: DISCONTINUED | OUTPATIENT
Start: 2022-10-03 | End: 2022-10-03 | Stop reason: HOSPADM

## 2022-10-03 RX ORDER — NEOSTIGMINE METHYLSULFATE 1 MG/ML
INJECTION, SOLUTION INTRAVENOUS
Status: DISCONTINUED | OUTPATIENT
Start: 2022-10-03 | End: 2022-10-03

## 2022-10-03 RX ORDER — SODIUM CHLORIDE 9 MG/ML
INJECTION, SOLUTION INTRAVENOUS CONTINUOUS
Status: DISCONTINUED | OUTPATIENT
Start: 2022-10-03 | End: 2022-10-03 | Stop reason: HOSPADM

## 2022-10-03 RX ORDER — ROCURONIUM BROMIDE 10 MG/ML
INJECTION, SOLUTION INTRAVENOUS
Status: DISCONTINUED | OUTPATIENT
Start: 2022-10-03 | End: 2022-10-03

## 2022-10-03 RX ORDER — CARBOXYMETHYLCELLULOSE SODIUM 10 MG/ML
GEL OPHTHALMIC
Status: DISCONTINUED | OUTPATIENT
Start: 2022-10-03 | End: 2022-10-03

## 2022-10-03 RX ORDER — ROPIVACAINE HYDROCHLORIDE 5 MG/ML
INJECTION, SOLUTION EPIDURAL; INFILTRATION; PERINEURAL
Status: COMPLETED | OUTPATIENT
Start: 2022-10-03 | End: 2022-10-03

## 2022-10-03 RX ADMIN — ACETAMINOPHEN 1000 MG: 500 TABLET ORAL at 09:10

## 2022-10-03 RX ADMIN — DEXAMETHASONE SODIUM PHOSPHATE 8 MG: 4 INJECTION, SOLUTION INTRAMUSCULAR; INTRAVENOUS at 01:10

## 2022-10-03 RX ADMIN — ONDANSETRON 4 MG: 2 INJECTION INTRAMUSCULAR; INTRAVENOUS at 03:10

## 2022-10-03 RX ADMIN — FAMOTIDINE 20 MG: 10 INJECTION, SOLUTION INTRAVENOUS at 01:10

## 2022-10-03 RX ADMIN — MIDAZOLAM HYDROCHLORIDE 2 MG: 1 INJECTION, SOLUTION INTRAMUSCULAR; INTRAVENOUS at 01:10

## 2022-10-03 RX ADMIN — PROPOFOL 50 MG: 10 INJECTION, EMULSION INTRAVENOUS at 03:10

## 2022-10-03 RX ADMIN — EPHEDRINE SULFATE 10 MG: 50 INJECTION INTRAVENOUS at 01:10

## 2022-10-03 RX ADMIN — NEOSTIGMINE METHYLSULFATE 4 MG: 1 INJECTION INTRAVENOUS at 03:10

## 2022-10-03 RX ADMIN — ROPIVACAINE HYDROCHLORIDE 7.5 ML: 5 INJECTION EPIDURAL; INFILTRATION; PERINEURAL at 10:10

## 2022-10-03 RX ADMIN — EPHEDRINE SULFATE 10 MG: 50 INJECTION INTRAVENOUS at 02:10

## 2022-10-03 RX ADMIN — LIDOCAINE HYDROCHLORIDE 100 MG: 20 INJECTION INTRAVENOUS at 01:10

## 2022-10-03 RX ADMIN — BUPIVACAINE HYDROCHLORIDE 20 ML: 2.5 INJECTION, SOLUTION EPIDURAL; INFILTRATION; INTRACAUDAL; PERINEURAL at 10:10

## 2022-10-03 RX ADMIN — CARBOXYMETHYLCELLULOSE SODIUM 4 DROP: 10 GEL OPHTHALMIC at 01:10

## 2022-10-03 RX ADMIN — Medication: at 04:10

## 2022-10-03 RX ADMIN — GLYCOPYRROLATE 0.4 MG: 0.2 INJECTION, SOLUTION INTRAMUSCULAR; INTRAVENOUS at 03:10

## 2022-10-03 RX ADMIN — FENTANYL CITRATE 100 MCG: 50 INJECTION, SOLUTION INTRAMUSCULAR; INTRAVENOUS at 01:10

## 2022-10-03 RX ADMIN — SODIUM CHLORIDE: 0.9 INJECTION, SOLUTION INTRAVENOUS at 09:10

## 2022-10-03 RX ADMIN — FENTANYL CITRATE 100 MCG: 0.05 INJECTION, SOLUTION INTRAMUSCULAR; INTRAVENOUS at 10:10

## 2022-10-03 RX ADMIN — ROCURONIUM BROMIDE 50 MG: 10 INJECTION INTRAVENOUS at 01:10

## 2022-10-03 RX ADMIN — CEFAZOLIN 2 G: 330 INJECTION, POWDER, FOR SOLUTION INTRAMUSCULAR; INTRAVENOUS at 01:10

## 2022-10-03 RX ADMIN — MIDAZOLAM 2 MG: 1 INJECTION INTRAMUSCULAR; INTRAVENOUS at 10:10

## 2022-10-03 RX ADMIN — PROPOFOL 200 MG: 10 INJECTION, EMULSION INTRAVENOUS at 01:10

## 2022-10-03 RX ADMIN — Medication 30 MG: at 02:10

## 2022-10-03 RX ADMIN — SODIUM CHLORIDE, SODIUM GLUCONATE, SODIUM ACETATE, POTASSIUM CHLORIDE, MAGNESIUM CHLORIDE, SODIUM PHOSPHATE, DIBASIC, AND POTASSIUM PHOSPHATE: .53; .5; .37; .037; .03; .012; .00082 INJECTION, SOLUTION INTRAVENOUS at 01:10

## 2022-10-03 NOTE — PATIENT INSTRUCTIONS
[address], [place]                                    [phone]             Postoperative Instructions for Shoulder Surgery               Your Surgery Included:   Open              Arthroscopic [] Instability Repair     [] Diagnostic   [] Rotator Cuff Repair     [] Lysis of Adhesions / Manipulation [] Distal Clavicle Resection    [x] Debridement [] Biceps Tenodesis       [x] Labrum  [] Rotator Cuff   [] Cartilage   [] Contracture Release    [] SLAP Repair   [] Fracture Fixation    [] Instability Repair  [] AC Joint Reconstruction      [x] Rotator Cuff Repair [] Joint Replacement     [x] Subacromial Decompression / Bursectomy   [] Hemiarthroplasty  [] Total Shoulder    [x] Biceps Tenotomy / Tenodesis    [] Reverse Total Shoulder       [] Distal Clavicle Resection          [] Amniox Arthrocentesis    [] Contracture Release                 Call our office ([phone]) immediately if you experience any of the following:      Excessive bleeding or pus like drainage at the incision site      Uncontrollable pain not relieved by pain medication      Excessive swelling or redness at the incision site      Fever above 101.5 degrees not controlled with Tylenol or Motrin      Shortness of Breath      Any foul odor or blistering from the surgery site   FOR EMERGENCIES: If any unusual problems or difficulties occur, call our office at [phone], or page the  at [phone] who will direct your call appropriately   1.   Pain Management: A cold therapy cuff, pain medications, local injections, and in some cases, regional anesthesia injections are used to manage your post-operative pain. The decision to use each of these options is based on their risks and benefits.    Medications: You were given one or more of the following medication prescriptions before leaving the hospital. Have the prescriptions filled at a pharmacy on your way home and follow the instructions on the  bottles. If you need a refill, please call your pharmacy.     Narcotic Medication (usually Vicodin ES, Lortab, Percocet or Nucynta): Begin taking the medication before your shoulder starts to hurt. Some patients do not like to take any medication, but if you wait until your pain is severe before taking, you will be very uncomfortable for several hours waiting for the narcotic to work. Always take with food.    Nausea / Vomiting: For this issue, we prescribe Phenergan, use this medication as directed.    Cold Therapy: You may have been sent home with a [employer] cold therapy unit and wrap for your shoulder. Fill with ice and water to the indicated fill line and use throughout the day for the first two days and then as needed to help relieve pain and control swelling.     Regional Anesthesia Injections (Blocks): You may have been given a regional nerve block either before or after surgery. This may make your entire shoulder numb for 24-36 hours.                    2.   Diet: Eat a bland diet for the first day after surgery. Progress your diet as tolerated. Constipation may occur with Narcotic usage, contact our office if you are experiencing constipation.   3.   Activity: After you arrive at home, spend most of the first 24 hours resting in bed, on the couch, or in a reclining chair. After the first 24 hours at home, slowly increase your activity level based on your symptoms.   4.   Dressing Change: Remove the dressing on the 3rd day. It is normal for some blood to be seen on the dressings. It is also normal for you to see apparent bruising on the skin around your shoulder when you remove the dressing. If present, leave the steri-strip tape across the incisions. If you are concerned by the drainage or the appearance of your shoulder, please call one of the numbers listed below.   5.   Showering: You may shower on the 3rd day after surgery with waterproof bandages over small incisions. If you have an incision with  Prineo (clear mesh), it does not need to be covered. Do not submerge in any water until after your postoperative appointment in clinic.   6.   Shoulder Sling (with/without Pillow attachment): You may have been sent home with a sling / pillow attachment holding your arm away from your body. You may remove the sling when changing clothes or bathing. Make sure to wear the sling while sleeping unless instructed otherwise. You may remove at rest or for exercises.           [x] You need to wear the sling with pillow for 24 hours a day for 6 weeks.   7.  Shoulder Exercises: Begin these exercises the first day after surgery in order to help you regain your motion and strength. You may do the following marked exercises for 2-5 mins five times a day:   [x] Shoulder shrugs - Shrug your shoulders up and down.   [x] Pendulums - Bend forward allowing your arm to hang down in front of you. Gently swing your arm side-to-side and front to back.                                                                                                                               [] Passive abduction - Have a family member gently lift your arm away from your body bringing your elbow up to the level of your shoulder.                                                                                                                   [] Shoulder rotation - With your arm at your side, have a family member gently rotate your arm internally and externally.                                                                                                                  [] Scapular retractions - (Squeeze shoulder blades together): Squeeze shoulder blades together while slightly pulling them down (do not shrug your shoulders upward); You can perform 10-15 reps, several times throughout the day, when seated at your desk, driving in the car, etc.                                                                                                                      [] Pulley exercises - Put a towel or long sleeve shirt over the top of a door. Stand facing the door. Use your good arm to gently pull your operative arm up in front of you.   [] Elbow motion - Straighten and bend your elbow.                                                                                                               [x] Ball squeezes - use ball attached to sling/pillow or soft (nerf) ball for  strengthening                                                                                                                 8.  Physical Therapy: Physical therapy is an essential component to your recovery from surgery. Your physical therapy will start in 5 days.   FIRST POSTOPERATIVE VISIT: As scheduled.

## 2022-10-03 NOTE — TRANSFER OF CARE
"Anesthesia Transfer of Care Note    Patient: Medardo Vizcaino III    Procedure(s) Performed: Procedure(s) (LRB):  REPAIR, ROTATOR CUFF, ARTHROSCOPIC (Left)  FIXATION, TENDON (Left)  ARTHROSCOPY, SHOULDER, WITH SUBACROMIAL SPACE DECOMPRESSION (Left)  DEBRIDEMENT, SHOULDER, ARTHROSCOPIC-LABRUM (Left)    Patient location: PACU    Anesthesia Type: general    Transport from OR: Transported from OR on 6-10 L/min O2 by face mask with adequate spontaneous ventilation    Post pain: adequate analgesia    Post assessment: no apparent anesthetic complications    Post vital signs: stable    Level of consciousness: sedated    Nausea/Vomiting: no nausea/vomiting    Complications: none    Transfer of care protocol was followed      Last vitals:   Visit Vitals  /74 (BP Location: Right arm, Patient Position: Sitting)   Pulse 71   Temp 36.7 °C (98 °F) (Oral)   Resp 18   Ht 5' 8" (1.727 m)   Wt 74.8 kg (165 lb)   SpO2 99%   BMI 25.09 kg/m²     "

## 2022-10-03 NOTE — PLAN OF CARE
VSS. Pt tolerating oral liquids. pt denies complaints of pain. Polar care intact with pt personal belongings. dsg clean, dry, & intact. Pt and mother already received home medications. Discharge instructions reviewed with pt and mother. Verbalized understanding. Pt states he is ready for discharge. All questions answered.

## 2022-10-03 NOTE — PLAN OF CARE
Dr. Galeano notified downstream occlusion occurred again. V/O to modify nimbus pump settings. Infusion adjusted to continuous 6cc/hr  with 5cc demand bolus.

## 2022-10-03 NOTE — BRIEF OP NOTE
[facility] - Surgery (Hospital)  Brief Operative Note    Surgery Date: 10/3/2022     Surgeon(s) and Role:     * Jef Lawrence MD - Primary    Assisting Surgeon: None    Pre-op Diagnosis:  Complete rotator cuff tear or rupture of left shoulder, not specified as traumatic [M75.122]  Biceps tendonosis of left shoulder [M67.814]    Post-op Diagnosis:  Post-Op Diagnosis Codes:     * Complete rotator cuff tear or rupture of left shoulder, not specified as traumatic [M75.122]     * Biceps tendonosis of left shoulder [M67.814]    Procedure(s) (LRB):  REPAIR, ROTATOR CUFF, ARTHROSCOPIC (Left)  FIXATION, TENDON (Left)  ARTHROSCOPY, SHOULDER, WITH SUBACROMIAL SPACE DECOMPRESSION (Left)  DEBRIDEMENT, SHOULDER, ARTHROSCOPIC-LABRUM (Left)    Anesthesia: General    Operative Findings: See op note    Estimated Blood Loss: * No values recorded between 10/3/2022  2:05 PM and 10/3/2022  3:55 PM *         Specimens:   Specimen (24h ago, onward)      None              Discharge Note    OUTCOME: Patient tolerated treatment/procedure well without complication and is now ready for discharge.    DISPOSITION: Home or Self Care    FINAL DIAGNOSIS:    Problem List Items Addressed This Visit          Orthopedic    Rotator cuff tear    Relevant Orders    Vital signs    Cleanse with Chlorhexidine (CHG)    Diet NPO    Place KASEY hose    Place sequential compression device    Chlorohexidine Gluconate Bath    Full code    Place in Outpatient (Completed)    Insert peripheral IV    Clip and Prep Left Shoulder (do not shave axilla), Left Axilla, Left Radial, Left Brachial    Diet general    Call MD for:  temperature >100.4    Call MD for:  persistent nausea and vomiting    Call MD for:  severe uncontrolled pain    Call MD for:  difficulty breathing, headache or visual disturbances    Call MD for:  redness, tenderness, or signs of infection (pain, swelling, redness, odor or green/yellow discharge around incision site)    Call MD for:  hives    Call  MD for:  persistent dizziness or light-headedness    Call MD for:  extreme fatigue    Non weight bearing    Remove dressing in 72 hours         FOLLOWUP: In clinic    DISCHARGE INSTRUCTIONS:    Discharge Procedure Orders   Diet general     Call MD for:  temperature >100.4     Call MD for:  persistent nausea and vomiting     Call MD for:  severe uncontrolled pain     Call MD for:  difficulty breathing, headache or visual disturbances     Call MD for:  redness, tenderness, or signs of infection (pain, swelling, redness, odor or green/yellow discharge around incision site)     Call MD for:  hives     Call MD for:  persistent dizziness or light-headedness     Call MD for:  extreme fatigue     Remove dressing in 72 hours     Non weight bearing

## 2022-10-03 NOTE — ANESTHESIA PROCEDURE NOTES
Intubation    Date/Time: 10/3/2022 1:34 PM  Performed by: Christine Zavala CRNA  Authorized by: Nilesh Wells III, MD     Intubation:     Induction:  Intravenous    Intubated:  Postinduction    Mask Ventilation:  Easy mask    Attempts:  1    Attempted By:  CRNA    Method of Intubation:  Video laryngoscopy    Blade:  Baker 3    Laryngeal View Grade: Grade I - full view of cords      Difficult Airway Encountered?: No      Complications:  None    Airway Device:  Oral endotracheal tube    Airway Device Size:  7.5    Style/Cuff Inflation:  Cuffed    Inflation Amount (mL):  5    Tube secured:  23    Secured at:  The lips    Placement Verified By:  Capnometry    Complicating Factors:  None    Findings Post-Intubation:  BS equal bilateral

## 2022-10-03 NOTE — PLAN OF CARE
Dr. Galeano notified pt has downstream occlusion with nimbus pump. MD at bedside to assess. Will notified md if occurs again.

## 2022-10-03 NOTE — ANESTHESIA PREPROCEDURE EVALUATION
10/03/2022  Pre-operative evaluation for Procedure(s) (LRB):  REPAIR, ROTATOR CUFF, ARTHROSCOPIC (Left)  ARTHROSCOPY, SHOULDER, WITH DISTAL CLAVICLE EXCISION (Left)  REPAIR,TENDON,DISTAL PROXIMAL (Left)    Medardo Vizcaino III is a 51 y.o. male     Patient Active Problem List   Diagnosis    Hypothyroidism due to acquired atrophy of thyroid    Chronic pain of both shoulders    Rotator cuff tear    SLAP (superior labrum from anterior to posterior) tear    Biceps tendonitis    Acromioclavicular joint arthritis    S/P R shoulder surgery, arthroscopic RCR, SAD, DCE, open biceps tenodesis    Lateral epicondylitis of right elbow    Pain in both hands    Chronic pain of both knees    Marijuana user    Muscle tone increased    Primary osteoarthritis involving multiple joints    Other idiopathic scoliosis, thoracolumbar region    Left leg paresthesias    Decreased strength of trunk and back    Family history of arrhythmia    HTN (hypertension), benign       Review of patient's allergies indicates:   Allergen Reactions    Sulfa (sulfonamide antibiotics) Hives       No current facility-administered medications on file prior to encounter.     Current Outpatient Medications on File Prior to Encounter   Medication Sig Dispense Refill    levothyroxine (SYNTHROID) 75 MCG tablet Take 1 tablet (75 mcg total) by mouth once daily. 90 tablet 0    omega-3 fatty acids/fish oil (FISH OIL-OMEGA-3 FATTY ACIDS) 300-1,000 mg capsule Take by mouth once daily.      triamterene-hydrochlorothiazide 75-50 mg (MAXZIDE) 75-50 mg per tablet Take 1 tablet by mouth once daily. (Patient not taking: No sig reported) 90 tablet 1    vitamin B complex (B COMPLEX-VITAMIN B12 ORAL) Take by mouth.         Past Surgical History:   Procedure Laterality Date    EPIDURAL STEROID INJECTION N/A 2/9/2022    Procedure: Injection, Steroid,  Epidural C7/T1  DIRECT REFERRAL;  Surgeon: Deon Strauss MD;  Location: [facility];  Service: Pain Management;  Laterality: N/A;    NASAL SEPTUM SURGERY      SHOULDER ARTHROSCOPY      SHOULDER SURGERY      TONSILLECTOMY      TUMOR EXCISION      arm; lipoma       Social History     Socioeconomic History    Marital status: Unknown   Tobacco Use    Smoking status: Never    Smokeless tobacco: Never    Tobacco comments:     data analysis; ; no children   Substance and Sexual Activity    Alcohol use: Yes     Comment: one daily    Drug use: Yes     Types: Marijuana     Comment: daily since age 35; helps emotions and joint pain    Sexual activity: Yes     Partners: Female     Birth control/protection: Condom         CBC: No results for input(s): WBC, RBC, HGB, HCT, PLT, MCV, MCH, MCHC in the last 72 hours.    CMP: No results for input(s): NA, K, CL, CO2, BUN, CREATININE, GLU, MG, PHOS, CALCIUM, ALBUMIN, PROT, ALKPHOS, ALT, AST, BILITOT in the last 72 hours.    INR  No results for input(s): PT, INR, PROTIME, APTT in the last 72 hours.        Diagnostic Studies:      EKG:  Marked sinus bradycardia   Early repolarization   Otherwise normal ECG   When compared with ECG of 13-OCT-2021 08:43,   No significant change was found   Confirmed by JOSE BARRETO MD ([id number]) on 9/22/2022 11:31:07 AM     2D Echo:  No results found for this or any previous visit.        Pre-op Assessment    I have reviewed the Patient Summary Reports.     I have reviewed the Nursing Notes. I have reviewed the NPO Status.      Review of Systems  Anesthesia Hx:  Personal Hx of Anesthesia complications Pulmonary/Ventilatory Issues, laryngospasm, Pulmonary Edema, negative pressure pulmonary edema   Hematology/Oncology:  Hematology Normal   Oncology Normal     EENT/Dental:EENT/Dental Normal   Cardiovascular:   Hypertension    Pulmonary:  Pulmonary Normal    Renal/:  Renal/ Normal     Hepatic/GI:  Hepatic/GI Normal     Musculoskeletal:   Arthritis     Neurological:  Neurology Normal    Endocrine:   Hypothyroidism    Dermatological:  Skin Normal        Physical Exam  General: Well nourished, Cooperative and Alert    Airway:  Mallampati: II   Tongue: Normal    Dental:  Intact        Anesthesia Plan  Type of Anesthesia, risks & benefits discussed:    Anesthesia Type: Gen ETT, Regional  Intra-op Monitoring Plan: Standard ASA Monitors  Post Op Pain Control Plan: multimodal analgesia and IV/PO Opioids PRN  Induction:  IV  Informed Consent: Informed consent signed with the Patient and all parties understand the risks and agree with anesthesia plan.  All questions answered.   ASA Score: 2  Day of Surgery Review of History & Physical: H&P Update referred to the surgeon/provider.    Ready For Surgery From Anesthesia Perspective.     .

## 2022-10-03 NOTE — ANESTHESIA PROCEDURE NOTES
Left Interscalene Catheter    Patient location during procedure: pre-op   Block not for primary anesthetic.  Reason for block: at surgeon's request and post-op pain management   Post-op Pain Location: left shoulder   Start time: 10/3/2022 10:36 AM  Timeout: 10/3/2022 10:35 AM   End time: 10/3/2022 10:46 AM    Staffing  Authorizing Provider: Nilesh Wells III, MD  Performing Provider: Brijesh Quarles MD    Preanesthetic Checklist  Completed: patient identified, IV checked, site marked, risks and benefits discussed, surgical consent, monitors and equipment checked, pre-op evaluation and timeout performed  Peripheral Block  Patient position: sitting  Prep: ChloraPrep and site prepped and draped  Patient monitoring: heart rate, cardiac monitor, continuous pulse ox, continuous capnometry and frequent blood pressure checks  Block type: interscalene  Laterality: left  Injection technique: continuous  Needle  Needle type: Tuohy   Needle gauge: 18 G  Needle length: 2 in  Needle localization: anatomical landmarks and ultrasound guidance  Catheter type: non-stimulating  Catheter size: 20 G  Test dose: lidocaine 1.5% with Epi 1-to-200,000 and negative   -ultrasound image captured on disc.  Assessment  Injection assessment: negative aspiration, negative parasthesia and local visualized surrounding nerve  Paresthesia pain: none  Heart rate change: no  Slow fractionated injection: yes  Pain Tolerance: comfortable throughout block  Medications:    Medications: ropivacaine (NAROPIN) injection 0.5% - Perineural   7.5 mL - 10/3/2022 10:40:00 AM    Additional Notes  VSS.  [facility] RN monitoring vitals throughout procedure.  Patient tolerated procedure well.  15 cc of 0.25% ropivacaine with epi 1:300k administered for the block.

## 2022-10-03 NOTE — ANESTHESIA PROCEDURE NOTES
Left PEC II block    Patient location during procedure: pre-op   Block not for primary anesthetic.  Reason for block: at surgeon's request and post-op pain management   Post-op Pain Location: left shoulder   Start time: 10/3/2022 10:46 AM  Timeout: 10/3/2022 10:35 AM   End time: 10/3/2022 10:50 AM    Staffing  Authorizing Provider: Nilesh Wells III, MD  Performing Provider: Brijesh Quarles MD    Preanesthetic Checklist  Completed: patient identified, IV checked, site marked, risks and benefits discussed, surgical consent, monitors and equipment checked, pre-op evaluation and timeout performed  Peripheral Block  Patient position: supine  Prep: ChloraPrep  Patient monitoring: heart rate, cardiac monitor, continuous pulse ox, continuous capnometry and frequent blood pressure checks  Block type: pectoral  Laterality: left  Injection technique: single shot  Needle  Needle type: Stimuplex   Needle gauge: 21 G  Needle length: 4 in  Needle localization: anatomical landmarks and ultrasound guidance   -ultrasound image captured on disc.  Assessment  Injection assessment: negative aspiration, negative parasthesia and local visualized surrounding nerve  Paresthesia pain: none  Heart rate change: no  Slow fractionated injection: yes  Pain Tolerance: comfortable throughout block  Medications:    Medications: bupivacaine (pf) (MARCAINE) injection 0.25% - Perineural   20 mL - 10/3/2022 10:50:00 AM    Additional Notes  Performed PEC II  A time out was conducted. Site kelli confirmed with team and patient. Allergies reviewed.   Vital signs stable throughout block. RN monitoring vitals throughout.   Needle advanced under continuous ultrasound guidance.  Local injected incrementally after confirming negative aspiration. No signs or symptoms of intravascular or intraneural injection noted.   No persistent paresthesias elicited or expressed. Patient tolerated procedure well.  20 cc of 0.25% bupivacaine with epinephrine 1:300K, PF  dexamethasone 1 mg, and clonidine 50 mcg used for the block.

## 2022-10-03 NOTE — PLAN OF CARE
Pt ready for block, pre procedure complete.  Pt resting in bed with call light in reach.  No distress noted.  Anest. Notified pt ready for block.    Pt has piercing in below the waist, MD team stated okay to leave in.  All piercing above the waist have been removed.

## 2022-10-04 VITALS
BODY MASS INDEX: 25.01 KG/M2 | HEART RATE: 55 BPM | OXYGEN SATURATION: 96 % | TEMPERATURE: 99 F | HEIGHT: 68 IN | SYSTOLIC BLOOD PRESSURE: 123 MMHG | WEIGHT: 165 LBS | RESPIRATION RATE: 20 BRPM | DIASTOLIC BLOOD PRESSURE: 69 MMHG

## 2022-10-04 NOTE — ANESTHESIA POST-OP PAIN MANAGEMENT
"Acute Pain Service Progress Note    10/4/2022 1407    Patient contacted regarding [facility] infusion follow up. Reports that pain has been well controlled with the infusion pump. Denies signs of local anesthetic toxicity. PNC dressing remains clean, dry, and intact. All questions answered. Reminded patient that the infusion should be discontinued and PNC removed whenever the "infusion complete" alert is seen on the display screen or by POD 5 (10/8/22) at 12pm, whichever comes first. Encouraged patient to call the [facility] 24/7 support line at [phone] or the [facility] line at [phone] for any [facility] related questions/issues. Verbalizes understanding. Will continue to follow up.            "

## 2022-10-05 NOTE — ANESTHESIA POSTPROCEDURE EVALUATION
Anesthesia Post Evaluation    Patient: Medardo Gillane III    Procedure(s) Performed: Procedure(s) (LRB):  REPAIR, ROTATOR CUFF, ARTHROSCOPIC (Left)  FIXATION, TENDON (Left)  ARTHROSCOPY, SHOULDER, WITH SUBACROMIAL SPACE DECOMPRESSION (Left)  DEBRIDEMENT, SHOULDER, ARTHROSCOPIC-LABRUM (Left)    Final Anesthesia Type: general      Patient location during evaluation: PACU  Patient participation: Yes- Able to Participate  Level of consciousness: awake and alert  Post-procedure vital signs: reviewed and stable  Pain management: adequate  Airway patency: patent    PONV status at discharge: No PONV  Anesthetic complications: no      Cardiovascular status: blood pressure returned to baseline  Respiratory status: unassisted  Hydration status: euvolemic  Follow-up not needed.          Vitals Value Taken Time   /69 10/03/22 1717   Temp 37.2 °C (98.9 °F) 10/03/22 1700   Pulse 54 10/03/22 1724   Resp 30 10/03/22 1724   SpO2 96 % 10/03/22 1724   Vitals shown include unvalidated device data.      Event Time   Out of Recovery 16:59:00         Pain/Sherri Score: No data recorded

## 2022-10-06 ENCOUNTER — CLINICAL SUPPORT (OUTPATIENT)
Dept: REHABILITATION | Facility: HOSPITAL | Age: 52
End: 2022-10-06
Payer: COMMERCIAL

## 2022-10-06 DIAGNOSIS — M75.122 NONTRAUMATIC COMPLETE TEAR OF LEFT ROTATOR CUFF: ICD-10-CM

## 2022-10-06 DIAGNOSIS — M25.612 DECREASED RANGE OF MOTION OF SHOULDER, LEFT: ICD-10-CM

## 2022-10-06 PROCEDURE — 97161 PT EVAL LOW COMPLEX 20 MIN: CPT | Mod: PO

## 2022-10-08 PROBLEM — M25.612 DECREASED RANGE OF MOTION OF SHOULDER, LEFT: Status: ACTIVE | Noted: 2022-10-08

## 2022-10-08 NOTE — PLAN OF CARE
[facility]   Physical Therapy Initial Evaluation     Date: 10/6/2022   [facility]  [facility] [id number]    Therapy Diagnosis:   Encounter Diagnoses   Name Primary?    Nontraumatic complete tear of left rotator cuff     Decreased range of motion of shoulder, left      Physician: Erik Alarcon PA-C    Physician Orders: PT Eval and Treat   Medical Diagnosis from Referral: M75.122 (ICD-10-CM) - Nontraumatic complete tear of left rotator cuff  Evaluation Date: 10/6/2022  Authorization Period Expiration: 9/30/2022  Plan of Care Expiration: 4/6/2022  Progress Note Due: 11/6/2022  Visit # / Visits authorized: 1/ 1   FOTO: Redo at first after    Precautions: Standard     Time In: 8:59  Time Out: 9:26  Total Appointment Time (timed & untimed codes): 27 minutes      SUBJECTIVE      Post-op Diagnosis:  Post-Op Diagnosis Codes:     * Complete rotator cuff tear or rupture of left shoulder, not specified as traumatic [M75.122]     * Biceps tendonosis of left shoulder [M67.814]     Procedure(s) (LRB):  REPAIR, ROTATOR CUFF, ARTHROSCOPIC (Left)  FIXATION, TENDON (Left)  ARTHROSCOPY, SHOULDER, WITH SUBACROMIAL SPACE DECOMPRESSION (Left)  DEBRIDEMENT, SHOULDER, ARTHROSCOPIC-LABRUM (Left)      Date of onset: 10/3/2022     History of current condition - Medardo reports: Pt had Rotator cuff repair on right shoulder by same physician in 2014. Pt states he was told to wear sling for 6 weeks and to take out the pain pump and dressings on Saturday. Pt had no PARAMJIT that lead to his shoulder injury.  Pt denies any numbness or tingling in his left upper extremity. Pt is not in pain as he states he still has pain pump and nerve block. Pt reports he understands his precautions and his exercises. Pt was given shoulder shrugs, scap squeezes and elbow extensions.     Falls: No    Imaging, MRI studies: Impression:     Small full-thickness supraspinatus tendon insertional tear, background of mild  tendinosis.     Nonspecific edema of the fat superficial to the rotator interval which is otherwise morphologically normal.  Correlate for symptoms of adhesive capsulitis.    Prior Therapy: Yes  Social History:  lives alone  Occupation: Yes, analyst for [employer]  Prior Level of Function: Pt was biking and performing household chores  Current Level of Function: Pt is limited due to rotator cuff repair    Pain:  Current 0/10, worst 0/10, best 0/10   Location: left shoulder    Description: No Pain at this time  Aggravating Factors: N/A at this time  Easing Factors: Nerve block and pain pump    Patients goals: Pt would like to return to bicycling      Medical History:   Past Medical History:   Diagnosis Date    Acromioclavicular joint arthritis 3/20/2014    Hypothyroidism        Surgical History:   Medardo Vizcaino III  has a past surgical history that includes Tumor excision; Nasal septum surgery; Tonsillectomy; Shoulder surgery; Shoulder arthroscopy; Epidural steroid injection (N/A, 2022); Arthroscopic repair of rotator cuff of shoulder (Left, 10/3/2022); Fixation of tendon (Left, 10/3/2022); Arthroscopy of shoulder with decompression of subacromial space (Left, 10/3/2022); and Arthroscopic debridement of shoulder (Left, 10/3/2022).    Medications:   Medardo has a current medication list which includes the following prescription(s): diclofenac sodium, levothyroxine, fish oil-omega-3 fatty acids, ondansetron, tramadol, triamterene-hydrochlorothiazide 75-50 mg, and vitamin b complex.    Allergies:   Review of patient's allergies indicates:   Allergen Reactions    Sulfa (sulfonamide antibiotics) Hives          OBJECTIVE       N/A = Not Applicable     Cervical AROM: Pain/Dysfunction with Movement:   Flexion: 60 degrees    Extension: 50 degrees    Right side bendin degrees    Left side bendin degrees    Right rotation: 60 degrees    Left rotation: 60 degrees        Shoulder Right Right Right Left Left Left  Pain/Dysfunction with Movement    AROM PROM MMT AROM PROM MMT    Flexion 180 180 5/5 N/A 60 N/A    Abduction 180 180 5/5 N/A N/A N/A    External Rotation @0 Abduction 45 45 5/5 N/A 20 N/A      Elbow Right Right Right Left Left Left Pain/Dysfunction with Movement    AROM PROM MMT AROM PROM MMT    Flexion 150 150 5/5 N/A 150 N/A    Extension 0 0 5/5 0 0 N/A    Pronation 85 85 5/5 85 85 N/A    Supination 90 90 5/5 90 90 N/A        Wrist Right Right Right Left Left Left Pain/Dysfunction with Movement    AROM PROM MMT AROM PROM MMT    Flexion 70 70 5/5 70 70 5/5    Extension 80 80 5/5 80 80 5/5           Limitation/Restriction for FOTO Shoulder Survey    Therapist reviewed FOTO scores for Medardo Vizcaino III on 10/6/2022.   FOTO documents entered into [employer] - see Media section.    Limitation Score: TBD%         TREATMENT       PATIENT EDUCATION AND HOME EXERCISES     Education provided:   - Importance of protecting the repaired shoulder and following precautions (ex. Lifting, using shoulder, improper use of sling)    Written Home Exercises Provided: Patient instructed to cont prior HEP. Exercises were reviewed and Medardo was able to demonstrate them prior to the end of the session.  Medardo demonstrated good  understanding of the education provided. See EMR under Patient Instructions for exercises provided during therapy sessions.    ASSESSMENT     Medardo is a 51 y.o. male referred to outpatient Physical Therapy with a medical diagnosis of M75.122 (ICD-10-CM) - Nontraumatic complete tear of left rotator cuff. Patient presents with decreased left shoulder range of motion and strength consistent with diagnosis of status post left rotator cuff repair. Pt's deficits are decreasing his participation on biking and work while decreasing his quality of life. Pt will be in maximum protection phase with emphasis on maintaining the integrity of the repair. Pt will also maintain and progress shoulder passive range of motion  until it is appropriate to perform strengthening of shoulder musculature.     Patient prognosis is Excellent.   Patient will benefit from skilled outpatient Physical Therapy to address the deficits stated above and in the chart below, provide patient /family education, and to maximize patientt's level of independence.     Plan of care discussed with patient: Yes  Patient's spiritual, cultural and educational needs considered and patient is agreeable to the plan of care and goals as stated below:     Anticipated Barriers for therapy: None    Medical Necessity is demonstrated by the following  History  Co-morbidities and personal factors that may impact the plan of care Co-morbidities:   See Medical History     Personal Factors:   no deficits     low   Examination  Body Structures and Functions, activity limitations and participation restrictions that may impact the plan of care Body Regions:   neck  upper extremities    Body Systems:    ROM  strength  skin integrity    Participation Restrictions:   Biking and exercise    Activity limitations:   Learning and applying knowledge  no deficits    General Tasks and Commands  no deficits    Communication  no deficits    Mobility  lifting and carrying objects  fine hand use (grasping/picking up)  driving (bike, car, motorcycle)    Self care  washing oneself (bathing, drying, washing hands)  caring for body parts (brushing teeth, shaving, grooming)  dressing    Domestic Life  doing house work (cleaning house, washing dishes, laundry)  assisting others    Interactions/Relationships  no deficits    Life Areas  no deficits    Community and Social Life  no deficits         low   Clinical Presentation stable and uncomplicated low   Decision Making/ Complexity Score: low     Goals: Short Term Goals (12 Weeks):   1. Pt will be independent with HEP to supplement PT in improving functional use of R UE.  2. Pt will increase pain free L shoulder elevation PROM to >/= 160 deg to improve  functional mobility of UE  3. Pt will increase L shoulder ER PROM in 90 deg abduction to >/=20 deg to improve functional mobility of UE  Long Term Goals (24 Weeks):   1. Pt will improve FOTO score to </=TBD% limited to decrease perceived limitation with carrying, moving, and handling objects.  2. Pt will increase L shoulder PROM to WNL in all planes to improve functional use of R RUE.  3. Pt will increase L shoulder AROM to WFL in all planes to improve functional use of R RUE.  4. Pt will improve L shoulder MMTs to = 5/5 to promote equal use of B UEs in performing functional tasks.  5. Pt will lift 10 lb objects without pain to promote functional QOL.  6. Pt to report pain </= 0/10 with ADLs and IADLs using L UE to improve functional QOL.       PLAN   Plan of care Certification: 10/6/2022 to 4/06/2022.    Outpatient Physical Therapy 1-2 times weekly for 10 weeks to include the following interventions: Electrical Stimulation IFC, Tens , Manual Therapy, Neuromuscular Re-ed, Patient Education, Therapeutic Activities, and Therapeutic Exercise.     Julius Varner, PT      I CERTIFY THE NEED FOR THESE SERVICES FURNISHED UNDER THIS PLAN OF TREATMENT AND WHILE UNDER MY CARE   Physician's comments:     Physician's Signature: ___________________________________________________

## 2022-10-10 NOTE — OP NOTE
[facility])  Surgery Department  Operative Note    SUMMARY     Date of Procedure: 10/3/2022     Procedure: Procedure(s) (LRB):  REPAIR, ROTATOR CUFF, ARTHROSCOPIC (Left)  FIXATION, TENDON (Left)  ARTHROSCOPY, SHOULDER, WITH SUBACROMIAL SPACE DECOMPRESSION (Left)  DEBRIDEMENT, SHOULDER, ARTHROSCOPIC-LABRUM (Left)     Surgeon(s) and Role:     * Jef Lawrence MD - Primary    Assisting Surgeon: MD Sachin Frye     Pre-Operative Diagnosis: Complete rotator cuff tear or rupture of left shoulder, not specified as traumatic [M75.122]  Biceps tendonosis of left shoulder [M67.814]    Post-Operative Diagnosis: Post-Op Diagnosis Codes:     * Complete rotator cuff tear or rupture of left shoulder, not specified as traumatic [M75.122]     * Biceps tendonosis of left shoulder [M67.814]    Anesthesia: General    Technical Procedures Used:     DATE OF SURGERY:  10/3/2022     PREOPERATIVE DIAGNOSES:   1. Left shoulder rotator cuff tear.   2. Left shoulder AC joint arthritis  3. Left shoulder labral tear / biceps tendinopathy    POSTOPERATIVE DIAGNOSES:   1. Left shoulder rotator cuff tear.   2. Left shoulder AC joint arthritis  3. Left shoulder labral tear / biceps tendinopathy    PROCEDURE:   1. Left shoulder arthroscopic rotator cuff repair.   2. Left shoulder arthroscopic distal clavicle excision  3. Left shoulder arthroscopic subacromial decompression.   4. Left shoulder open subpectoral biceps tenodesis  5. Left shoulder arthroscopic debridement labrum     SURGEON: Jef Lawrence M.D.     ASSISTANT: MD Sachin Frye      COMPLICATIONS: None.     POSITION: Beach chair.     ANESTHESIA: General endotracheal plus left upper extremity interscalene   block.     EXAMINATION UNDER ANESTHESIA: Left shoulder forward flexion 180 degrees,   abduction 120 degrees, full internal and external rotation   1+ anterior drawer, 1+ sulcus sign, 1+ posterior drawer.     ARTHROSCOPIC  FINDINGS:   1. Full thickness, crescent-shaped medium size supraspinatus / upper infraspinatus rotator cuff tear.   2. Small anterior acromial spur.   3. Arthritic distal clavicle  4. Intact glenohumeral cartilage surface  5. Biceps: high grade tendinopathy  6. Subscapularis: frayed  7. Labrum:  degenerative SLAP1      INDICATIONS FOR PROCEDURE: The patient is a 51 y.o.  year-old male  who has pain in his left shoulder. MRI confirms a tear of his rotator cuff.  After risks and benefits of surgery were discussed, he elects to proceed with operative  intervention. All risks and benefits have been discussed including the risks of stiffness for recurrent instability, irreparability of the tear, damage to neurovascular structures, damage to cartilage and the risk of anesthesia including stroke and heart attack. The patient seemed to understand and wished to proceed.     DESCRIPTION OF PROCEDURE: The patient was brought in the room. After undergoing general endotracheal anesthesia and left upper extremity interscalene block, he was placed in a well-padded modified beachchair position. Perioperative antibiotics were given.  his left upper extremity was prepped and draped in usual sterile fashion including the use of a sterile Spider arm juarez.     After time-out was performed, the standard posterior portal and anterior superior portal were created. Diagnostic arthroscopy performed. The glenohumeral joint was entered. There was no chondromalacia noted in the glenoid or humeral head. There was minimal mild fraying at the superior labrum. The anterior inferior labrum and posterior labrum were intact without evidence of tearing. The subscapularis had mild fraying.  Damaged subscap tissue was debrided down.  The remainder of the insertion was intact.    There was a medium size full-thickness crescent shaped tear of the supraspinatus tendon.     DEBRIDEMENT: Oscillating shaver, straight and curved biters were used to debride a  small flap of tissue off the superior labrum. The biceps had high grade tendiopathy and was released for planned tenodesis.    SUBACROMIAL DECOMPRESSION: The scope was taken out and redirected in the subacromial space. After excellent visualization was achieved, a lateral portal was created. The bursal tissue was cleaned off. The full-thickness crescent-shaped medium size rotator cuff tear was identified. The area was cleaned off for later repair. The undersurface of the acromion was cleaned off of soft tissues including releasing the CA ligament. A 5-mm sharona was introduced through the posterior portal and decompression was completed using cutting block technique down to a type 1 configuration.     DISTAL CLAVICLE EXCISION:  The soft tissues were cleaned off of the undersurface of the AC joint with Mitek VAPR device. The arthritic aspect of the distal clavicle was visualized and excellent hemostasis was achieved.  A 5-mm sharona was used to resect between 8-9 mm of bone from the distal aspect of the clavicle.  Careful attention was paid to resect adequate bone from the posterior and superior aspect of the AC joint and not to disrupt the superior aspect of the AC joint capsule.    ROTATOR CUFF REPAIR: The footprint of rotator cuff was cleaned off with Mitek VAPR device and oscillating shaver / sharona. This was judged to be amenable for repair with one suture anchors.  One  5.5 mm anchor were placed at the medial aspect of the footprint of the supraspinatus. All 4 suture limbs from each anchor were brought through in a large horizontal mattress convergence type sutures configuration using a suture penetrator and Esspressew.  These were tied arthroscopically down from lateral to medial. Excellent footprint coverage was achieved after all arthroscopic knots were tied down. Internal and external rotation confirmed excellent footprint compression with no evidence of gap formation.     OPEN SUBPECTORAL BICEPS TENODESIS: The  scope was taken out of the joint.  A 3 cm incision was made in the axillary fold for our open subpec biceps tenodesis.  Excellent hemostasis was achieved.  Blunt dissection was taken down to the fascia.  The fascia was released.  A pointed Roxanna was placed laterally under the pec.  A blunt Candido was placed medially to protect the neurovascular structures.  An Army-Navy was placed superiorly to complete exposure. Excellent visualization of the biceps groove and biceps tendon was achieved.  The tendon was brought into the wound with a right angle hemostat.  Frayed and tendinopathic aspect were debrided down.  An Arthrex Fiberloop suture started at the musculotendinous junction and was sutured one cm distally.  The excess tendon was removed and excellent control of our biceps was achieved.  A guide wire and 3.5 mm  hole was drilled into the humerus in the sub-pec position approximately one centimeter proximal to the inferior border of the pec major tendon.  Excess bone debris was removed.  An Arthrex proximal biceps 7mm button was loaded with the sutures from the biceps and was inserted into the  hole in the humerus.  Once it was deployed, it was judged to be stable in a unicortical position within the humerus.  The biceps was easily able to be tensioned down to give us an onlay position with proper tension of the biceps muscle.  A curved olmstead needle was used to shuttle one of the sutures through the tendon and the knot was tied to secure the biceps in excellent position on the humerus.  The correct length-tension relationship was restored for the biceps as the muscle tendon junction rested directly deep to the pectoralis major.  All areas were irrigated.  The wound was closed with 3-0 vicryl and 4-0 subcuticular monocryl.      Portal sites were closed with 3-0 Monocryl, covered with Mastisol, Steri-Strips, Xeroform, 4 x 4 fluffs, ABD pads and tape. The patient was placed in a sling and pillow for  protection, was extubated in the room, transferred to recovery room in stable condition accompanied by his physician.    There were no complications in the case. I was present, scrubbed and did perform all critical portions of the case.     Postop plan for the patient is to follow the medium size rotator cuff repair protocol.         Jef Lawrence M.D.     Description of the Findings of the Procedure: above    Significant Surgical Tasks Conducted by the Assistant(s), if Applicable: none    Complications: No    Estimated Blood Loss (EBL): * No values recorded between 10/3/2022  2:05 PM and 10/3/2022  3:57 PM *           Implants:   Implant Name Type Inv. Item Serial No.  Lot No. LRB No. Used Action   ANCHOR HEALIX 5.5 ADV BR3 - [id number]  ANCHOR HEALIX 5.5 ADV BR3  [employer] [id number] Left 1 Implanted   SYS IMPL PROXIMAL TENODESIS - [id number]  SYS IMPL PROXIMAL TENODESIS  [employer] [id number] Left 1 Implanted       Specimens:   Specimen (24h ago, onward)      None                    Condition: Good    Disposition: PACU - hemodynamically stable.    Attestation: I was present and scrubbed for the entire procedure.    Discharge Note    SUMMARY     Admit Date: 10/3/2022    Discharge Date and Time: 10/3/2022  5:35 PM    Hospital Course (synopsis of major diagnoses, care, treatment, and services provided during the course of the hospital stay): outpatient surgery     Final Diagnosis: Post-Op Diagnosis Codes:     * Complete rotator cuff tear or rupture of left shoulder, not specified as traumatic [M75.122]     * Biceps tendonosis of left shoulder [M67.814]    Disposition: Home or Self Care    Follow Up/Patient Instructions:     Medications:  Reconciled Home Medications:      Medication List        CONTINUE taking these medications      B COMPLEX-VITAMIN B12 ORAL  Take by mouth.     diclofenac sodium 1 % Gel  Commonly known as: VOLTAREN  Apply 2 g topically 3 (three) times daily. Apply to the area  of ankle pain 2-3x per day or night as needed     fish oil-omega-3 fatty acids 300-1,000 mg capsule  Take by mouth once daily.     levothyroxine 75 MCG tablet  Commonly known as: SYNTHROID  Take 1 tablet (75 mcg total) by mouth once daily.     ondansetron 4 MG tablet  Commonly known as: ZOFRAN  Take 1 tablet (4 mg total) by mouth every 8 (eight) hours as needed for Nausea.     traMADoL 50 mg tablet  Commonly known as: ULTRAM  Take 1 tablet (50 mg total) by mouth every 6 (six) hours as needed for Pain.     triamterene-hydrochlorothiazide 75-50 mg 75-50 mg per tablet  Commonly known as: MAXZIDE  Take 1 tablet by mouth once daily.            Discharge Procedure Orders   Diet general     Call MD for:  temperature >100.4     Call MD for:  persistent nausea and vomiting     Call MD for:  severe uncontrolled pain     Call MD for:  difficulty breathing, headache or visual disturbances     Call MD for:  redness, tenderness, or signs of infection (pain, swelling, redness, odor or green/yellow discharge around incision site)     Call MD for:  hives     Call MD for:  persistent dizziness or light-headedness     Call MD for:  extreme fatigue     Remove dressing in 72 hours     Non weight bearing

## 2022-10-18 ENCOUNTER — OFFICE VISIT (OUTPATIENT)
Dept: SPORTS MEDICINE | Facility: CLINIC | Age: 52
End: 2022-10-18
Payer: COMMERCIAL

## 2022-10-18 VITALS
BODY MASS INDEX: 25.01 KG/M2 | WEIGHT: 165 LBS | HEIGHT: 68 IN | SYSTOLIC BLOOD PRESSURE: 131 MMHG | DIASTOLIC BLOOD PRESSURE: 86 MMHG | HEART RATE: 52 BPM

## 2022-10-18 DIAGNOSIS — Z09 SURGERY FOLLOW-UP EXAMINATION: Primary | ICD-10-CM

## 2022-10-18 DIAGNOSIS — G89.18 ACUTE POSTOPERATIVE PAIN OF LEFT SHOULDER: ICD-10-CM

## 2022-10-18 DIAGNOSIS — M25.512 ACUTE POSTOPERATIVE PAIN OF LEFT SHOULDER: ICD-10-CM

## 2022-10-18 DIAGNOSIS — Z98.890 S/P ROTATOR CUFF REPAIR: ICD-10-CM

## 2022-10-18 PROCEDURE — 3079F PR MOST RECENT DIASTOLIC BLOOD PRESSURE 80-89 MM HG: ICD-10-PCS | Mod: CPTII,S$GLB,, | Performed by: PHYSICIAN ASSISTANT

## 2022-10-18 PROCEDURE — 99999 PR PBB SHADOW E&M-EST. PATIENT-LVL III: ICD-10-PCS | Mod: PBBFAC,,, | Performed by: PHYSICIAN ASSISTANT

## 2022-10-18 PROCEDURE — 1159F PR MEDICATION LIST DOCUMENTED IN MEDICAL RECORD: ICD-10-PCS | Mod: CPTII,S$GLB,, | Performed by: PHYSICIAN ASSISTANT

## 2022-10-18 PROCEDURE — 99024 PR POST-OP FOLLOW-UP VISIT: ICD-10-PCS | Mod: S$GLB,,, | Performed by: PHYSICIAN ASSISTANT

## 2022-10-18 PROCEDURE — 99999 PR PBB SHADOW E&M-EST. PATIENT-LVL III: CPT | Mod: PBBFAC,,, | Performed by: PHYSICIAN ASSISTANT

## 2022-10-18 PROCEDURE — 3075F PR MOST RECENT SYSTOLIC BLOOD PRESS GE 130-139MM HG: ICD-10-PCS | Mod: CPTII,S$GLB,, | Performed by: PHYSICIAN ASSISTANT

## 2022-10-18 PROCEDURE — 3079F DIAST BP 80-89 MM HG: CPT | Mod: CPTII,S$GLB,, | Performed by: PHYSICIAN ASSISTANT

## 2022-10-18 PROCEDURE — 1159F MED LIST DOCD IN RCRD: CPT | Mod: CPTII,S$GLB,, | Performed by: PHYSICIAN ASSISTANT

## 2022-10-18 PROCEDURE — 99024 POSTOP FOLLOW-UP VISIT: CPT | Mod: S$GLB,,, | Performed by: PHYSICIAN ASSISTANT

## 2022-10-18 PROCEDURE — 3075F SYST BP GE 130 - 139MM HG: CPT | Mod: CPTII,S$GLB,, | Performed by: PHYSICIAN ASSISTANT

## 2022-10-18 NOTE — PROGRESS NOTES
"CC: Left shoulder post op 2 weeks    Patient is here for his 2 week post op appointment s/p below and is doing well. Patient is doing PT at [facility] location and is progressing as expected. Patient is no longer taking pain medication. he denies any chest pain, SOB, fevers, chills, nausea, vomiting, or drainage from incision sites.     DATE OF SURGERY:  10/3/2022      PREOPERATIVE DIAGNOSES:   1. Left shoulder rotator cuff tear.   2. Left shoulder AC joint arthritis  3. Left shoulder labral tear / biceps tendinopathy     POSTOPERATIVE DIAGNOSES:   1. Left shoulder rotator cuff tear.   2. Left shoulder AC joint arthritis  3. Left shoulder labral tear / biceps tendinopathy     PROCEDURE:   1. Left shoulder arthroscopic rotator cuff repair.   2. Left shoulder arthroscopic distal clavicle excision  3. Left shoulder arthroscopic subacromial decompression.   4. Left shoulder open subpectoral biceps tenodesis  5. Left shoulder arthroscopic debridement labrum      SURGEON: Jef Lawrence M.D.     Pain well tolerated on pain medication  Sling in place  No issues reported    Review of Systems   Constitution: Negative. Negative for chills, fever and night sweats.    Cardiovascular: Negative for chest pain and syncope.   Respiratory: Negative for cough and shortness of breath.    Gastrointestinal: Negative for abdominal pain and bowel incontinence.      PE:    /86   Pulse (!) 52   Ht 5' 8" (1.727 m)   Wt 74.8 kg (165 lb)   BMI 25.09 kg/m²      Left shoulder    Incisions healed  No sign of infection  Swelling resolved  Compartments soft  Neurovascular status intact in extremity    PROM  Forward elevation 75 degrees  External rotation 30 degrees    Assessment:  2 weeks s/p left shoulder arthroscopic rotator cuff repair, distal clavicle excision, subacromial decompression, open subpectoral biceps tenodesis, labral debridement    Plan:  1. Continue PT   2. Pain medication as needed for PT; try to wean off for " next visit  3. Return to clinic in 4 weeks for 6 week post op follow up    All questions were answered. Instructed patient to call with questions or concerns in the interim.       Medical Dictation software was used during the dictation of portions or the entirety of this medical record.  Phonetic or grammatic errors may exist due to the use of this software. For clarification, refer to the author of the document.

## 2022-10-21 ENCOUNTER — CLINICAL SUPPORT (OUTPATIENT)
Dept: REHABILITATION | Facility: HOSPITAL | Age: 52
End: 2022-10-21
Attending: INTERNAL MEDICINE
Payer: COMMERCIAL

## 2022-10-21 DIAGNOSIS — M25.612 DECREASED RANGE OF MOTION OF SHOULDER, LEFT: Primary | ICD-10-CM

## 2022-10-21 PROCEDURE — 97140 MANUAL THERAPY 1/> REGIONS: CPT | Mod: PO

## 2022-10-21 PROCEDURE — 97112 NEUROMUSCULAR REEDUCATION: CPT | Mod: PO

## 2022-10-21 PROCEDURE — 97110 THERAPEUTIC EXERCISES: CPT | Mod: PO

## 2022-10-21 NOTE — PROGRESS NOTES
Physical Therapy Daily Treatment Note     [facility]  [id number]    Therapy Diagnosis:   Encounter Diagnosis   Name Primary?    Decreased range of motion of shoulder, left Yes     Physician: Erik Alarcon PA-C    Visit Date: 10/21/2022    Physician Orders: PT Eval and Treat   Medical Diagnosis from Referral: M75.122 (ICD-10-CM) - Nontraumatic complete tear of left rotator cuff  Evaluation Date: 10/6/2022  Authorization Period Expiration: 12/31/2022  Plan of Care Expiration: 4/6/2022  Progress Note Due: 11/6/2022  Visit # / Visits authorized: 1/ 29  FOTO: Get initial FOTO    1st FOTO Follow up:   2nd FOTO Follow up:     Time In: 1045am  Time Out: 1125am  Total Billable Time: 40 minutes    Precautions: Standard and and s/p L medium RTC repair on 10/3/2022    Procedure(s) (LRB):  REPAIR, ROTATOR CUFF, ARTHROSCOPIC (Left)  FIXATION, TENDON (Left)  ARTHROSCOPY, SHOULDER, WITH SUBACROMIAL SPACE DECOMPRESSION (Left)  DEBRIDEMENT, SHOULDER, ARTHROSCOPIC-LABRUM (Left)    Subjective     Pt reports: that his pain has been very well controlled since his surgery. The exercises he were given at his first appointment are going well. No complains at this time.   He was compliant with home exercise program.  Response to previous treatment: decreased pain  Functional change: ongoing    Pain: 0/10  Location: left shoulder      Objective     Medardo received therapeutic exercises to develop strength, endurance, and ROM for 15 minutes including:    Seated scapular retraction, 2 x 10  Seated shoulder shrugs, 2 x 10  Seated wrist extension with 3#, 2 x 10  Seated wrist flexion with 3#, 2 x 10  Pronation/supination with no resistance, 2 x 10  AROM elbow flexion and extension     Medardo received the following manual therapy techniques: Joint mobilizations were applied to the: left shoulder for 15 minutes, including:    Gentle posterior GH joints, grade I and   PROM into ER and flexion  AC and SC joint  mobs, grade III  Side lying scapular mobs in all directions, grade III    Medardo participated in neuromuscular re-education activities to improve: Coordination, Kinesthetic, and Proprioception for 10 minutes. The following activities were included:    Prone scapular retraction, 3 x 10  Scapular retractions with manual resistance from PT, 2 x 10  Scapular posterior tilting with manual resistance from PT, 2 x 10    Medardo participated in dynamic functional therapeutic activities to improve functional performance for 0  minutes, including:        Home Exercises Provided and Patient Education Provided     Education provided:   - Importance of protecting the repaired shoulder and following precautions (ex. Lifting, using shoulder, improper use of sling)    Written Home Exercises Provided: Patient instructed to cont prior HEP.  Exercises were reviewed and Medardo was able to demonstrate them prior to the end of the session.  Medardo demonstrated good  understanding of the education provided.     See EMR under Patient Instructions for exercises provided 10/21/2022.    Assessment     Medardo presents to PT today with his sling donned and in the appropriate position. He is having no pain at this time and is longer taking his pain medication. Gentle GH joint mobilizations were applied today and were tolerated well. Elbow, wrist and scapular exercises were the focus of the treatment session today as he remains in the maximum protection phase post operatively. He achieved about 50 degrees of ER, 70 degrees of flexion and 70 degrees of abduction passively today.     Medardo Is progressing well towards his goals.   Pt prognosis is Good.     Pt will continue to benefit from skilled outpatient physical therapy to address the deficits listed in the problem list box on initial evaluation, provide pt/family education and to maximize pt's level of independence in the home and community environment.     Pt's spiritual,  cultural and educational needs considered and pt agreeable to plan of care and goals.     Anticipated barriers to physical therapy: none    Goals:   Short Term Goals (12 Weeks):   1. Pt will be independent with HEP to supplement PT in improving functional use of R UE.  2. Pt will increase pain free L shoulder elevation PROM to >/= 160 deg to improve functional mobility of UE  3. Pt will increase L shoulder ER PROM in 90 deg abduction to >/=20 deg to improve functional mobility of UE  Long Term Goals (24 Weeks):   1. Pt will improve FOTO score to </=TBD% limited to decrease perceived limitation with carrying, moving, and handling objects.  2. Pt will increase L shoulder PROM to WNL in all planes to improve functional use of R RUE.  3. Pt will increase L shoulder AROM to WFL in all planes to improve functional use of R RUE.  4. Pt will improve L shoulder MMTs to = 5/5 to promote equal use of B UEs in performing functional tasks.  5. Pt will lift 10 lb objects without pain to promote functional QOL.  6. Pt to report pain </= 0/10 with ADLs and IADLs using L UE to improve functional QOL.     Plan     Continue to progress GH and ST joint mobility, gradually increase shoulder ROM, scapular muscle control     IZAIAH DAVIDSON, PT

## 2022-10-26 ENCOUNTER — CLINICAL SUPPORT (OUTPATIENT)
Dept: REHABILITATION | Facility: HOSPITAL | Age: 52
End: 2022-10-26
Attending: INTERNAL MEDICINE
Payer: COMMERCIAL

## 2022-10-26 DIAGNOSIS — M25.612 DECREASED RANGE OF MOTION OF SHOULDER, LEFT: Primary | ICD-10-CM

## 2022-10-26 PROCEDURE — 97140 MANUAL THERAPY 1/> REGIONS: CPT | Mod: PO

## 2022-10-26 PROCEDURE — 97110 THERAPEUTIC EXERCISES: CPT | Mod: PO

## 2022-10-26 PROCEDURE — 97112 NEUROMUSCULAR REEDUCATION: CPT | Mod: PO

## 2022-10-26 NOTE — PROGRESS NOTES
Physical Therapy Daily Treatment Note     Name: Medardo [facility]  Clinic Number: [id number]    Therapy Diagnosis:   Encounter Diagnosis   Name Primary?    Decreased range of motion of shoulder, left Yes       Physician: Erik Alarcon PA-C    Visit Date: 10/26/2022    Physician Orders: PT Eval and Treat   Medical Diagnosis from Referral: M75.122 (ICD-10-CM) - Nontraumatic complete tear of left rotator cuff  Evaluation Date: 10/6/2022  Authorization Period Expiration: 12/31/2022  Plan of Care Expiration: 4/6/2022  Progress Note Due: 11/6/2022  Visit # / Visits authorized: 2/ 29  FOTO: Get initial FOTO    1st FOTO Follow up:   2nd FOTO Follow up:     Time In: 11:01 am  Time Out: 11:40 am  Total Billable Time: 39 minutes    Precautions: Standard and and s/p L medium RTC repair on 10/3/2022    Procedure(s) (LRB):  REPAIR, ROTATOR CUFF, ARTHROSCOPIC (Left)  FIXATION, TENDON (Left)  ARTHROSCOPY, SHOULDER, WITH SUBACROMIAL SPACE DECOMPRESSION (Left)  DEBRIDEMENT, SHOULDER, ARTHROSCOPIC-LABRUM (Left)    Subjective     Pt reports: that he has been doing well with minimal pain  He was compliant with home exercise program.  Response to previous treatment: decreased pain  Functional change: ongoing    Pain: 0/10  Location: left shoulder      Objective     Medardo received therapeutic exercises to develop strength, endurance, and ROM for 16 minutes including:    Seated scapular retraction, 2 x 10  Seated shoulder shrugs, 2 x 10  Seated wrist extension with 3#, 2 x 10  Seated wrist flexion with 3#, 2 x 10  Radial deviation 2x10   Pronation/supination with no resistance, 2 x 10  AROM elbow flexion and extension     Medardo received the following manual therapy techniques: Joint mobilizations were applied to the: left shoulder for 14 minutes, including:    Gentle posterior GH joints, grade I and   PROM into ER and flexion  AC and SC joint mobs, grade III  Side lying scapular mobs in all directions, grade III    Medardo  participated in neuromuscular re-education activities to improve: Coordination, Kinesthetic, and Proprioception for 12 minutes. The following activities were included:    Prone scapular retraction, 3 x 10  Scapular retractions with manual resistance from PT, 2 x 10  Scapular posterior tilting with manual resistance from PT, 2 x 10    Medardo participated in dynamic functional therapeutic activities to improve functional performance for 0  minutes, including:        Home Exercises Provided and Patient Education Provided     Education provided:   - Importance of protecting the repaired shoulder and following precautions (ex. Lifting, using shoulder, improper use of sling)  - Adhesive Capsulitis     Written Home Exercises Provided: Patient instructed to cont prior HEP.  Exercises were reviewed and Medardo was able to demonstrate them prior to the end of the session.  Medardo demonstrated good  understanding of the education provided.     See EMR under Patient Instructions for exercises provided 10/21/2022.    Assessment     Medardo presents to PT today is sling donned appropriately and no pain. Pt was reeducated on early goals of physical therapy after rotator cuff repair as well as general concerns of adhesive capsulitis. Pt demonstrated good scapular control with exercises as as well as understanding of how to protect shoulder repair. Pt had no increase in symptoms after treatment session. Plan to continue to progress patient per protocol call, per tissue healing time, and as tolerated by patient.     Medardo Is progressing well towards his goals.   Pt prognosis is Good.     Pt will continue to benefit from skilled outpatient physical therapy to address the deficits listed in the problem list box on initial evaluation, provide pt/family education and to maximize pt's level of independence in the home and community environment.     Pt's spiritual, cultural and educational needs considered and pt agreeable to  plan of care and goals.     Anticipated barriers to physical therapy: none    Goals:   Short Term Goals (12 Weeks):   1. Pt will be independent with HEP to supplement PT in improving functional use of R UE.  2. Pt will increase pain free L shoulder elevation PROM to >/= 160 deg to improve functional mobility of UE  3. Pt will increase L shoulder ER PROM in 90 deg abduction to >/=20 deg to improve functional mobility of UE  Long Term Goals (24 Weeks):   1. Pt will improve FOTO score to </=TBD% limited to decrease perceived limitation with carrying, moving, and handling objects.  2. Pt will increase L shoulder PROM to WNL in all planes to improve functional use of R RUE.  3. Pt will increase L shoulder AROM to WFL in all planes to improve functional use of R RUE.  4. Pt will improve L shoulder MMTs to = 5/5 to promote equal use of B UEs in performing functional tasks.  5. Pt will lift 10 lb objects without pain to promote functional QOL.  6. Pt to report pain </= 0/10 with ADLs and IADLs using L UE to improve functional QOL.     Plan     Continue to progress GH and ST joint mobility, gradually increase shoulder ROM, scapular muscle control     Julius Varner, PT

## 2022-11-02 ENCOUNTER — CLINICAL SUPPORT (OUTPATIENT)
Dept: REHABILITATION | Facility: HOSPITAL | Age: 52
End: 2022-11-02
Attending: INTERNAL MEDICINE
Payer: COMMERCIAL

## 2022-11-02 ENCOUNTER — NURSE TRIAGE (OUTPATIENT)
Dept: ADMINISTRATIVE | Facility: CLINIC | Age: 52
End: 2022-11-02
Payer: COMMERCIAL

## 2022-11-02 DIAGNOSIS — M25.612 DECREASED RANGE OF MOTION OF SHOULDER, LEFT: Primary | ICD-10-CM

## 2022-11-02 PROCEDURE — 97112 NEUROMUSCULAR REEDUCATION: CPT | Mod: PO

## 2022-11-02 PROCEDURE — 97110 THERAPEUTIC EXERCISES: CPT | Mod: PO

## 2022-11-02 PROCEDURE — 97140 MANUAL THERAPY 1/> REGIONS: CPT | Mod: PO

## 2022-11-02 NOTE — TELEPHONE ENCOUNTER
Patient called to schedule his Covid-19 Moderna Booster. Appointment scheduled for 11/04/22 @ 1:00 PM at the [facility]. Patient states understanding of the date/time/location of appointment and cites no additional concerns at this time.    Reason for Disposition   Information only question and nurse able to answer    Additional Information   Negative: Nursing judgment   Negative: Nursing judgment   Negative: Nursing judgment   Negative: Nursing judgment    Protocols used: Information Only Call - No Triage-A-[place]

## 2022-11-02 NOTE — PROGRESS NOTES
Physical Therapy Daily Treatment Note     Name: Medardo [facility]  Clinic Number: [id number]    Therapy Diagnosis:   Encounter Diagnosis   Name Primary?    Decreased range of motion of shoulder, left Yes         Physician: Erik Alarcon PA-C    Visit Date: 11/2/2022    Physician Orders: PT Eval and Treat   Medical Diagnosis from Referral: M75.122 (ICD-10-CM) - Nontraumatic complete tear of left rotator cuff  Evaluation Date: 10/6/2022  Authorization Period Expiration: 12/31/2022  Plan of Care Expiration: 4/6/2022  Progress Note Due: 11/6/2022  Visit # / Visits authorized: 3/ 29  FOTO: Get initial FOTO    1st FOTO Follow up:   2nd FOTO Follow up:     Time In: 11:00 am  Time Out: 11:46 am  Total Billable Time: 46 minutes    Precautions: Standard and and s/p L medium RTC repair on 10/3/2022    Procedure(s) (LRB):  REPAIR, ROTATOR CUFF, ARTHROSCOPIC (Left)  FIXATION, TENDON (Left)  ARTHROSCOPY, SHOULDER, WITH SUBACROMIAL SPACE DECOMPRESSION (Left)  DEBRIDEMENT, SHOULDER, ARTHROSCOPIC-LABRUM (Left)    Subjective     Pt reports: that he gets stiffness in his left elbow due to being in the sling  He was compliant with home exercise program.  Response to previous treatment: decreased pain  Functional change: ongoing    Pain: 0/10  Location: left shoulder      Objective       Shoulder Left     PROM   Flexion 90   Abduction 45   External Rotation @45 Abduction 40       Medardo received therapeutic exercises to develop strength, endurance, and ROM for 20 minutes including:    Seated scapular retraction, 2 x 10  Seated shoulder shrugs, 2 x 10  Seated wrist extension with 3#, 2 x 15  Seated wrist flexion with 3#, 2 x 15  Radial deviation 2x20   Pronation/supination with no resistance, 2 x 10  Ball Squeezes 2x20   Finger Extensions 2x20  AAROM elbow flexion and extension     Medardo received the following manual therapy techniques: Joint mobilizations were applied to the: left shoulder for 14 minutes,  including:    PROM into ER and flexion  Side lying scapular mobs in all directions, grade III    Medardo participated in neuromuscular re-education activities to improve: Coordination, Kinesthetic, and Proprioception for 12 minutes. The following activities were included:    Side Lying scapular retraction, 3 x 10  Scapular retractions with manual resistance from PT, 2 x 10  Scapular posterior tilting with manual resistance from PT, 2 x 10    Medardo participated in dynamic functional therapeutic activities to improve functional performance for 0  minutes, including:        Home Exercises Provided and Patient Education Provided     Education provided:   - Importance of protecting the repaired shoulder and following precautions (ex. Lifting, using shoulder, improper use of sling)  - Adhesive Capsulitis     Written Home Exercises Provided: Patient instructed to cont prior HEP.  Exercises were reviewed and Medardo was able to demonstrate them prior to the end of the session.  Medardo demonstrated good  understanding of the education provided.     See EMR under Patient Instructions for exercises provided 10/21/2022.    Assessment     Medardo is able to maintain shoulder range of motion. Pt did report slight pain with passive range of motion when attempting to flex shoulder passed 90 degrees. Pt demonstrated good control of scapular stabilizers. Plan to progress patient as tolerated and as per medium rotator cuff repair protocol.     Medardo Is progressing well towards his goals.   Pt prognosis is Good.     Pt will continue to benefit from skilled outpatient physical therapy to address the deficits listed in the problem list box on initial evaluation, provide pt/family education and to maximize pt's level of independence in the home and community environment.     Pt's spiritual, cultural and educational needs considered and pt agreeable to plan of care and goals.     Anticipated barriers to physical therapy:  none    Goals:   Short Term Goals (12 Weeks):   1. Pt will be independent with HEP to supplement PT in improving functional use of R UE.  2. Pt will increase pain free L shoulder elevation PROM to >/= 160 deg to improve functional mobility of UE  3. Pt will increase L shoulder ER PROM in 90 deg abduction to >/=20 deg to improve functional mobility of UE  Long Term Goals (24 Weeks):   1. Pt will improve FOTO score to </=TBD% limited to decrease perceived limitation with carrying, moving, and handling objects.  2. Pt will increase L shoulder PROM to WNL in all planes to improve functional use of R RUE.  3. Pt will increase L shoulder AROM to WFL in all planes to improve functional use of R RUE.  4. Pt will improve L shoulder MMTs to = 5/5 to promote equal use of B UEs in performing functional tasks.  5. Pt will lift 10 lb objects without pain to promote functional QOL.  6. Pt to report pain </= 0/10 with ADLs and IADLs using L UE to improve functional QOL.     Plan     Continue to progress GH and ST joint mobility, gradually increase shoulder ROM, scapular muscle control     Julius Varner, PT

## 2022-11-04 ENCOUNTER — IMMUNIZATION (OUTPATIENT)
Dept: PRIMARY CARE CLINIC | Facility: CLINIC | Age: 52
End: 2022-11-04
Payer: COMMERCIAL

## 2022-11-04 DIAGNOSIS — Z23 NEED FOR VACCINATION: Primary | ICD-10-CM

## 2022-11-04 PROCEDURE — 91313 COVID-19, MRNA, LNP-S, BIVALENT BOOSTER, PF, 50 MCG/0.5 ML: CPT | Mod: PBBFAC | Performed by: INTERNAL MEDICINE

## 2022-11-04 PROCEDURE — 0134A COVID-19, MRNA, LNP-S, BIVALENT BOOSTER, PF, 50 MCG/0.5 ML: CPT | Mod: CV19,PBBFAC | Performed by: INTERNAL MEDICINE

## 2022-11-09 ENCOUNTER — CLINICAL SUPPORT (OUTPATIENT)
Dept: REHABILITATION | Facility: HOSPITAL | Age: 52
End: 2022-11-09
Attending: INTERNAL MEDICINE
Payer: COMMERCIAL

## 2022-11-09 DIAGNOSIS — M25.612 DECREASED RANGE OF MOTION OF SHOULDER, LEFT: Primary | ICD-10-CM

## 2022-11-09 PROCEDURE — 97140 MANUAL THERAPY 1/> REGIONS: CPT | Mod: PO

## 2022-11-09 PROCEDURE — 97112 NEUROMUSCULAR REEDUCATION: CPT | Mod: PO

## 2022-11-09 PROCEDURE — 97110 THERAPEUTIC EXERCISES: CPT | Mod: PO

## 2022-11-09 NOTE — PROGRESS NOTES
Physical Therapy Daily Treatment Note and Progress Note     Name: Medardo Vizcaino [facility]  Clinic Number: [id number]    Therapy Diagnosis:   Encounter Diagnosis   Name Primary?    Decreased range of motion of shoulder, left Yes       Physician: Erik Alarcon PA-C    Visit Date: 11/9/2022    Physician Orders: PT Eval and Treat   Medical Diagnosis from Referral: M75.122 (ICD-10-CM) - Nontraumatic complete tear of left rotator cuff  Evaluation Date: 10/6/2022  Authorization Period Expiration: 12/31/2022  Plan of Care Expiration: 4/6/2022  Progress Note Due: 11/6/2022  Visit # / Visits authorized: 4/ 29  FOTO: 0/5    1st FOTO Follow up:   2nd FOTO Follow up:     Time In: 10:53 am  Time Out: 11:54 am  Total Billable Time: 61 minutes    Precautions: Standard and and s/p L medium RTC repair on 10/3/2022    Procedure(s) (LRB):  REPAIR, ROTATOR CUFF, ARTHROSCOPIC (Left)  FIXATION, TENDON (Left)  ARTHROSCOPY, SHOULDER, WITH SUBACROMIAL SPACE DECOMPRESSION (Left)  DEBRIDEMENT, SHOULDER, ARTHROSCOPIC-LABRUM (Left)    Subjective     Pt reports: that his is feeling well with no complaints. Pt is ready to see surgeon to have sling restriction removed.  He was compliant with home exercise program.  Response to previous treatment: decreased pain  Functional change: ongoing    Pain: 0/10  Location: left shoulder      Objective       Shoulder Left     PROM   Flexion 110   Abduction 45   External Rotation @45 Abduction 40       Medardo received therapeutic exercises to develop strength, endurance, and ROM for 37 minutes including:      Pendulums 2 min forward   Dowel ER 30x  Dowel Shoulder Flexion 2x15  Forward bow 2 min  Seated scapular retraction, 2 x 10  Seated shoulder shrugs, 2 x 10  Seated wrist extension with 3#, 2 x 15  Seated wrist flexion with 3#, 2 x 15  Radial deviation 2x20   Pronation/supination with no resistance, 2 x 10  Ball Squeezes 2x20   Finger Extensions 2x20  AAROM elbow flexion and extension   Thoracic  extension 10x 3s holds     Medardo received the following manual therapy techniques: Joint mobilizations were applied to the: left shoulder for 8 minutes, including:    PROM into ER and flexion  Side lying scapular mobs in all directions, grade III    Medardo participated in neuromuscular re-education activities to improve: Coordination, Kinesthetic, and Proprioception for 16 minutes. The following activities were included:    Prone Scapular retraction  with shoulder at 90 2x10  Side Lying scapular retraction, 3 x 10  Scapular retractions with manual resistance from PT, 2 x 10  Scapular posterior tilting with manual resistance from PT, 2 x 10    Medardo participated in dynamic functional therapeutic activities to improve functional performance for 0  minutes, including:        Home Exercises Provided and Patient Education Provided     Education provided:   - Importance of protecting the repaired shoulder and following precautions (ex. Lifting, using shoulder, improper use of sling)  - Adhesive Capsulitis     Written Home Exercises Provided: Patient instructed to cont prior HEP.  Exercises were reviewed and Medardo was able to demonstrate them prior to the end of the session.  Medardo demonstrated good  understanding of the education provided.     See EMR under Patient Instructions for exercises provided 10/21/2022.    Assessment     Medardo is progressing well and still a great candidate for physical therapy. He demonstrated good scapular control with prone scapular retraction with upper extremity in a gravity dependent position. Pt did not report pain with passive shoulder flexion today but did report tension with forward bow exercise. Pt reported non painful crepitus with passive shoulder flexion. Pt's next visit will be at 6 weeks post operation and pt will progress to more active assisted exercises.     Medardo Is progressing well towards his goals.   Pt prognosis is Good.     Pt will continue to  benefit from skilled outpatient physical therapy to address the deficits listed in the problem list box on initial evaluation, provide pt/family education and to maximize pt's level of independence in the home and community environment.     Pt's spiritual, cultural and educational needs considered and pt agreeable to plan of care and goals.     Anticipated barriers to physical therapy: none    Goals:   Short Term Goals (12 Weeks):   1. Pt will be independent with HEP to supplement PT in improving functional use of R UE. (MET)   2. Pt will increase pain free L shoulder elevation PROM to >/= 160 deg to improve functional mobility of UE (Progressing)  3. Pt will increase L shoulder ER PROM in 90 deg abduction to >/=20 deg to improve functional mobility of UE (Progressing)  Long Term Goals (24 Weeks):   1. Pt will improve FOTO score to </=TBD% limited to decrease perceived limitation with carrying, moving, and handling objects.  2. Pt will increase L shoulder PROM to WNL in all planes to improve functional use of R RUE.  3. Pt will increase L shoulder AROM to WFL in all planes to improve functional use of R RUE.  4. Pt will improve L shoulder MMTs to = 5/5 to promote equal use of B UEs in performing functional tasks.  5. Pt will lift 10 lb objects without pain to promote functional QOL.  6. Pt to report pain </= 0/10 with ADLs and IADLs using L UE to improve functional QOL.     Plan     Continue to progress GH and ST joint mobility, gradually increase shoulder ROM, scapular muscle control     Julius Varner, PT

## 2022-11-15 ENCOUNTER — OFFICE VISIT (OUTPATIENT)
Dept: SPORTS MEDICINE | Facility: CLINIC | Age: 52
End: 2022-11-15
Payer: COMMERCIAL

## 2022-11-15 VITALS
HEIGHT: 68 IN | DIASTOLIC BLOOD PRESSURE: 74 MMHG | SYSTOLIC BLOOD PRESSURE: 115 MMHG | BODY MASS INDEX: 25.01 KG/M2 | WEIGHT: 165 LBS

## 2022-11-15 DIAGNOSIS — Z98.890 S/P ROTATOR CUFF REPAIR: Primary | ICD-10-CM

## 2022-11-15 PROCEDURE — 99024 PR POST-OP FOLLOW-UP VISIT: ICD-10-PCS | Mod: S$GLB,,, | Performed by: ORTHOPAEDIC SURGERY

## 2022-11-15 PROCEDURE — 3074F SYST BP LT 130 MM HG: CPT | Mod: CPTII,S$GLB,, | Performed by: ORTHOPAEDIC SURGERY

## 2022-11-15 PROCEDURE — 3008F PR BODY MASS INDEX (BMI) DOCUMENTED: ICD-10-PCS | Mod: CPTII,S$GLB,, | Performed by: ORTHOPAEDIC SURGERY

## 2022-11-15 PROCEDURE — 99999 PR PBB SHADOW E&M-EST. PATIENT-LVL III: ICD-10-PCS | Mod: PBBFAC,,, | Performed by: ORTHOPAEDIC SURGERY

## 2022-11-15 PROCEDURE — 1159F PR MEDICATION LIST DOCUMENTED IN MEDICAL RECORD: ICD-10-PCS | Mod: CPTII,S$GLB,, | Performed by: ORTHOPAEDIC SURGERY

## 2022-11-15 PROCEDURE — 3008F BODY MASS INDEX DOCD: CPT | Mod: CPTII,S$GLB,, | Performed by: ORTHOPAEDIC SURGERY

## 2022-11-15 PROCEDURE — 3078F PR MOST RECENT DIASTOLIC BLOOD PRESSURE < 80 MM HG: ICD-10-PCS | Mod: CPTII,S$GLB,, | Performed by: ORTHOPAEDIC SURGERY

## 2022-11-15 PROCEDURE — 3074F PR MOST RECENT SYSTOLIC BLOOD PRESSURE < 130 MM HG: ICD-10-PCS | Mod: CPTII,S$GLB,, | Performed by: ORTHOPAEDIC SURGERY

## 2022-11-15 PROCEDURE — 99999 PR PBB SHADOW E&M-EST. PATIENT-LVL III: CPT | Mod: PBBFAC,,, | Performed by: ORTHOPAEDIC SURGERY

## 2022-11-15 PROCEDURE — 3078F DIAST BP <80 MM HG: CPT | Mod: CPTII,S$GLB,, | Performed by: ORTHOPAEDIC SURGERY

## 2022-11-15 PROCEDURE — 99024 POSTOP FOLLOW-UP VISIT: CPT | Mod: S$GLB,,, | Performed by: ORTHOPAEDIC SURGERY

## 2022-11-15 PROCEDURE — 1159F MED LIST DOCD IN RCRD: CPT | Mod: CPTII,S$GLB,, | Performed by: ORTHOPAEDIC SURGERY

## 2022-11-15 NOTE — PROGRESS NOTES
"CC: Left shoulder post op 2 weeks    Patient is here for his 2 week post op appointment s/p below and is doing well. Patient is doing PT at [facility] location and feels that he is progressing slowly. No pain.     DATE OF SURGERY:  10/3/2022      PREOPERATIVE DIAGNOSES:   1. Left shoulder rotator cuff tear.   2. Left shoulder AC joint arthritis  3. Left shoulder labral tear / biceps tendinopathy     POSTOPERATIVE DIAGNOSES:   1. Left shoulder rotator cuff tear.   2. Left shoulder AC joint arthritis  3. Left shoulder labral tear / biceps tendinopathy     PROCEDURE:   1. Left shoulder arthroscopic rotator cuff repair.   2. Left shoulder arthroscopic distal clavicle excision  3. Left shoulder arthroscopic subacromial decompression.   4. Left shoulder open subpectoral biceps tenodesis  5. Left shoulder arthroscopic debridement labrum      SURGEON: Jef Lawrence M.D.     Pain well tolerated on pain medication  Sling in place  No issues reported    Review of Systems   Constitution: Negative. Negative for chills, fever and night sweats.    Cardiovascular: Negative for chest pain and syncope.   Respiratory: Negative for cough and shortness of breath.    Gastrointestinal: Negative for abdominal pain and bowel incontinence.      PE:    /74   Ht 5' 8" (1.727 m)   Wt 74.8 kg (165 lb)   BMI 25.09 kg/m²      Left shoulder    Incisions healed  No sign of infection  Swelling resolved  Compartments soft  Neurovascular status intact in extremity    PROM  Forward elevation 80 degrees  External rotation 30 degrees    Assessment:  6 weeks s/p left shoulder arthroscopic rotator cuff repair, distal clavicle excision, subacromial decompression, open subpectoral biceps tenodesis, labral debridement    Plan:  1. Continue PT   2. Discontinue sling  3. RTC in 6 weeks.     All questions were answered. Instructed patient to call with questions or concerns in the interim.       Medical Dictation software was used during the " dictation of portions or the entirety of this medical record.  Phonetic or grammatic errors may exist due to the use of this software. For clarification, refer to the author of the document.

## 2022-11-16 ENCOUNTER — CLINICAL SUPPORT (OUTPATIENT)
Dept: REHABILITATION | Facility: HOSPITAL | Age: 52
End: 2022-11-16
Payer: COMMERCIAL

## 2022-11-16 DIAGNOSIS — M25.612 DECREASED RANGE OF MOTION OF SHOULDER, LEFT: Primary | ICD-10-CM

## 2022-11-16 PROCEDURE — 97110 THERAPEUTIC EXERCISES: CPT | Mod: PO

## 2022-11-16 PROCEDURE — 97112 NEUROMUSCULAR REEDUCATION: CPT | Mod: PO

## 2022-11-16 NOTE — PROGRESS NOTES
Physical Therapy Daily Treatment Note     Name: Medardo Vizcaino [facility]  Clinic Number: [id number]    Therapy Diagnosis:   Encounter Diagnosis   Name Primary?    Decreased range of motion of shoulder, left Yes       Physician: Erik Alarcon PA-C    Visit Date: 11/16/2022    Physician Orders: PT Eval and Treat   Medical Diagnosis from Referral: M75.122 (ICD-10-CM) - Nontraumatic complete tear of left rotator cuff  Evaluation Date: 10/6/2022  Authorization Period Expiration: 12/31/2022  Plan of Care Expiration: 4/6/2022  Progress Note Due: 11/6/2022  Visit # / Visits authorized: 5/ 29  FOTO: 0/5    1st FOTO Follow up:   2nd FOTO Follow up:     Time In: 12:56 am  Time Out: 1:55 am  Total Billable Time: 59 minutes    Precautions: Standard and and s/p L medium RTC repair on 10/3/2022    Procedure(s) (LRB):  REPAIR, ROTATOR CUFF, ARTHROSCOPIC (Left)  FIXATION, TENDON (Left)  ARTHROSCOPY, SHOULDER, WITH SUBACROMIAL SPACE DECOMPRESSION (Left)  DEBRIDEMENT, SHOULDER, ARTHROSCOPIC-LABRUM (Left)    Subjective     Pt reports: that he saw Dr. Lawrence for his 6 week follow up. He expresses his concerns that he feel he is behind on he rehab and not moving his shoulder through enough range of motion.     He was compliant with home exercise program.  Response to previous treatment: decreased pain  Functional change: ongoing    Pain: 0/10  Location: left shoulder      Objective       Shoulder Left     PROM   Flexion 150   Abduction 90   External Rotation @90 Abduction 50       Medardo received therapeutic exercises to develop strength, endurance, and ROM for 39 minutes including:    Dowel ER 2x15  Dowel Shoulder Flexion 2x15  Forward bow 2 min  AAROM Seated towel slides 2x10  Standing Isometric 10s x 6  - Flexion   - ER  - Abduction  - IR   - Hitchhikers  Pulleys for flexion 2x 20  PROM into ER and flexion    Medardo received the following manual therapy techniques: Joint mobilizations were applied to the: left shoulder for 0  minutes, including:      Side lying scapular mobs in all directions, grade III    Medardo participated in neuromuscular re-education activities to improve: Coordination, Kinesthetic, and Proprioception for 16 minutes. The following activities were included:    Prone Scapular retraction with shoulder at 90 2x10  Side Lying Shoulder Flexion with Dowel 2x15  Light perturbations at 45 degrees shoulder abduction with UE resting on towel 2x30s      Medardo participated in dynamic functional therapeutic activities to improve functional performance for 0  minutes, including:        Home Exercises Provided and Patient Education Provided     Education provided:   - Importance of protecting the repaired shoulder and following precautions (ex. Lifting, using shoulder, improper use of sling)  - Adhesive Capsulitis     Written Home Exercises Provided: Patient instructed to cont prior HEP.  Exercises were reviewed and Medardo was able to demonstrate them prior to the end of the session.  Medardo demonstrated good  understanding of the education provided.     See EMR under Patient Instructions for exercises provided 10/21/2022.    Assessment     Medardo  tolerated increase in exercise intensity and increase in AAROM exercises. Pt reported no pain in his shoulder during exercises as his only report was increase in tension at end range. Pt was educated on the tissue healing timelines as reason for progressing slowly during the first 6 weeks. Pt will not progress to full shoulder flexion and external range of motion as tolerated by the patient. Pt will be progressed according to small - medium rotator cuff protocol.     Medardo Is progressing well towards his goals.   Pt prognosis is Good.     Pt will continue to benefit from skilled outpatient physical therapy to address the deficits listed in the problem list box on initial evaluation, provide pt/family education and to maximize pt's level of independence in the home and  community environment.     Pt's spiritual, cultural and educational needs considered and pt agreeable to plan of care and goals.     Anticipated barriers to physical therapy: none    Goals:   Short Term Goals (12 Weeks):   1. Pt will be independent with HEP to supplement PT in improving functional use of R UE. (MET)   2. Pt will increase pain free L shoulder elevation PROM to >/= 160 deg to improve functional mobility of UE (Progressing)  3. Pt will increase L shoulder ER PROM in 90 deg abduction to >/=20 deg to improve functional mobility of UE (Progressing)  Long Term Goals (24 Weeks):   1. Pt will improve FOTO score to </=TBD% limited to decrease perceived limitation with carrying, moving, and handling objects.  2. Pt will increase L shoulder PROM to WNL in all planes to improve functional use of R RUE.  3. Pt will increase L shoulder AROM to WFL in all planes to improve functional use of R RUE.  4. Pt will improve L shoulder MMTs to = 5/5 to promote equal use of B UEs in performing functional tasks.  5. Pt will lift 10 lb objects without pain to promote functional QOL.  6. Pt to report pain </= 0/10 with ADLs and IADLs using L UE to improve functional QOL.     Plan     Continue to progress GH and ST joint mobility, gradually increase shoulder ROM, scapular muscle control     Julius Varner, PT

## 2022-11-21 ENCOUNTER — CLINICAL SUPPORT (OUTPATIENT)
Dept: REHABILITATION | Facility: HOSPITAL | Age: 52
End: 2022-11-21
Attending: INTERNAL MEDICINE
Payer: COMMERCIAL

## 2022-11-21 DIAGNOSIS — M25.612 DECREASED RANGE OF MOTION OF SHOULDER, LEFT: Primary | ICD-10-CM

## 2022-11-21 PROCEDURE — 97140 MANUAL THERAPY 1/> REGIONS: CPT | Mod: PO

## 2022-11-21 PROCEDURE — 97110 THERAPEUTIC EXERCISES: CPT | Mod: PO

## 2022-11-21 PROCEDURE — 97112 NEUROMUSCULAR REEDUCATION: CPT | Mod: PO

## 2022-11-21 NOTE — PROGRESS NOTES
Physical Therapy Daily Treatment Note     Name: Medardo Vizcaino [facility]  Clinic Number: [id number]    Therapy Diagnosis:   Encounter Diagnosis   Name Primary?    Decreased range of motion of shoulder, left Yes         Physician: Erik Alarcon PA-C    Visit Date: 11/21/2022    Physician Orders: PT Eval and Treat   Medical Diagnosis from Referral: M75.122 (ICD-10-CM) - Nontraumatic complete tear of left rotator cuff  Evaluation Date: 10/6/2022  Authorization Period Expiration: 12/31/2022  Plan of Care Expiration: 4/6/2022  Progress Note Due: 11/6/2022  Visit # / Visits authorized: 7/ 29  FOTO: 1/5    1st FOTO Follow up:   2nd FOTO Follow up:     Time In: 12:47  Time Out: 1:48  Total Billable Time: 61 minutes    Precautions: Standard and and s/p L medium RTC repair on 10/3/2022    Procedure(s) (LRB):  REPAIR, ROTATOR CUFF, ARTHROSCOPIC (Left)  FIXATION, TENDON (Left)  ARTHROSCOPY, SHOULDER, WITH SUBACROMIAL SPACE DECOMPRESSION (Left)  DEBRIDEMENT, SHOULDER, ARTHROSCOPIC-LABRUM (Left)    Subjective     Pt reports: that he is getting some soreness in his left arm from roughtly his elbow down.     He was compliant with home exercise program.  Response to previous treatment: decreased pain  Functional change: ongoing    Pain: 0/10  Location: left shoulder      Objective       Shoulder Left     PROM   Flexion 155   Abduction 90   External Rotation @90 Abduction 50       Medardo received therapeutic exercises to develop strength, endurance, and ROM for 33 minutes including:    Dowel ER 2x15  Dowel Shoulder Flexion 2x15  AAROM Seated towel slides 2x10  Standing Isometric 10s x 6  - Flexion   - ER  - Abduction  - IR   - Hitchhikers  Pulleys for flexion 2x 20  PROM into ER and flexion    Medardo received the following manual therapy techniques: Joint mobilizations were applied to the: left shoulder for 9 minutes, including:    Posterior glides Grade III  Inferior Glides Grade III      Medardo participated in  neuromuscular re-education activities to improve: Coordination, Kinesthetic, and Proprioception for 19 minutes. The following activities were included:    Prone Scapular retraction with shoulder at 90 2x10  Side Lying Shoulder Flexion with Dowel 2x15  Light perturbations at 45 degrees shoulder abduction with UE resting on towel 2x30s  Prone row to neutral  2x15  Prone extension (begin in neutral rotation)  Side lying serratus punches    Medardo participated in dynamic functional therapeutic activities to improve functional performance for 0  minutes, including:        Home Exercises Provided and Patient Education Provided     Education provided:   - Importance of protecting the repaired shoulder and following precautions (ex. Lifting, using shoulder, improper use of sling)  - Adhesive Capsulitis     Written Home Exercises Provided: Patient instructed to cont prior HEP.  Exercises were reviewed and Medardo was able to demonstrate them prior to the end of the session.  Medardo demonstrated good  understanding of the education provided.     See EMR under Patient Instructions for exercises provided 10/21/2022.    Assessment     Medardo reports slight discomfort at end range shoulder flexion and patient continues to be educated to avoid shoulder pain with exercises. Pt required verbal and tactile cues for scapular stabilization during isometrics. Pt can flex shoulder in scaption to 90 degrees against gravity but then performed upper trap compensation strategies in order to attempt higher elevation. Pt needs to continue to work on scapular stabilizers.     Medardo Is progressing well towards his goals.   Pt prognosis is Good.     Pt will continue to benefit from skilled outpatient physical therapy to address the deficits listed in the problem list box on initial evaluation, provide pt/family education and to maximize pt's level of independence in the home and community environment.     Pt's spiritual, cultural  and educational needs considered and pt agreeable to plan of care and goals.     Anticipated barriers to physical therapy: none    Goals:   Short Term Goals (12 Weeks):   1. Pt will be independent with HEP to supplement PT in improving functional use of R UE. (MET)   2. Pt will increase pain free L shoulder elevation PROM to >/= 160 deg to improve functional mobility of UE (Progressing)  3. Pt will increase L shoulder ER PROM in 90 deg abduction to >/=20 deg to improve functional mobility of UE (Progressing)  Long Term Goals (24 Weeks):   1. Pt will improve FOTO score to </=TBD% limited to decrease perceived limitation with carrying, moving, and handling objects.  2. Pt will increase L shoulder PROM to WNL in all planes to improve functional use of R RUE.  3. Pt will increase L shoulder AROM to WFL in all planes to improve functional use of R RUE.  4. Pt will improve L shoulder MMTs to = 5/5 to promote equal use of B UEs in performing functional tasks.  5. Pt will lift 10 lb objects without pain to promote functional QOL.  6. Pt to report pain </= 0/10 with ADLs and IADLs using L UE to improve functional QOL.     Plan     Continue to progress GH and ST joint mobility, gradually increase shoulder ROM, scapular muscle control     Julius Varner, PT

## 2022-11-23 ENCOUNTER — CLINICAL SUPPORT (OUTPATIENT)
Dept: REHABILITATION | Facility: HOSPITAL | Age: 52
End: 2022-11-23
Payer: COMMERCIAL

## 2022-11-23 DIAGNOSIS — M25.612 DECREASED RANGE OF MOTION OF SHOULDER, LEFT: Primary | ICD-10-CM

## 2022-11-23 PROCEDURE — 97112 NEUROMUSCULAR REEDUCATION: CPT | Mod: PO

## 2022-11-23 PROCEDURE — 97110 THERAPEUTIC EXERCISES: CPT | Mod: PO

## 2022-11-23 NOTE — PROGRESS NOTES
Physical Therapy Daily Treatment Note     Name: Medardo Vizcaino III  Clinic Number: [id number]    Therapy Diagnosis:   Encounter Diagnosis   Name Primary?    Decreased range of motion of shoulder, left Yes       Physician: Erik Alarcon PA-C    Visit Date: 11/23/2022    Physician Orders: PT Eval and Treat   Medical Diagnosis from Referral: M75.122 (ICD-10-CM) - Nontraumatic complete tear of left rotator cuff  Evaluation Date: 10/6/2022  Authorization Period Expiration: 12/31/2022  Plan of Care Expiration: 4/6/2022  Progress Note Due: 11/6/2022  Visit # / Visits authorized: 8/ 29  FOTO: 2/5    1st FOTO Follow up:   2nd FOTO Follow up:     Time In: 12:50  Time Out: 1:49  Total Billable Time: 59 minutes    Precautions: Standard and and s/p L medium RTC repair on 10/3/2022    Procedure(s) (LRB):  REPAIR, ROTATOR CUFF, ARTHROSCOPIC (Left)  FIXATION, TENDON (Left)  ARTHROSCOPY, SHOULDER, WITH SUBACROMIAL SPACE DECOMPRESSION (Left)  DEBRIDEMENT, SHOULDER, ARTHROSCOPIC-LABRUM (Left)    Subjective     Pt reports: that he is doing well and has been performing his exercises    He was compliant with home exercise program.  Response to previous treatment: decreased pain  Functional change: ongoing    Pain: 0/10  Location: left shoulder      Objective       Shoulder Left     PROM   Flexion 155   Abduction 90   External Rotation @90 Abduction 85       Medardo received therapeutic exercises to develop strength, endurance, and ROM for 23 minutes including:    Dowel ER 2x15  Dowel Shoulder Flexion 2x15  AAROM Seated dowel flexion 2x10  Standing Isometric 10s x 6  - Flexion   - ER  - Abduction  - IR   - Hitchhikers  Pulleys for flexion 2x 20  PROM into ER and flexion    Medardo received the following manual therapy techniques: Joint mobilizations were applied to the: left shoulder for 9 minutes, including:    Posterior glides Grade III  Inferior Glides Grade III      Medardo participated in neuromuscular re-education  activities to improve: Coordination, Kinesthetic, and Proprioception for 27 minutes. The following activities were included:    Prone Scapular retraction with shoulder at 90 2x10  Side Lying Shoulder Flexion with Dowel 2x15  Light perturbations at 45 degrees shoulder abduction with UE resting on towel 2x30s  Prone row to neutral  2x15  Prone extension (begin in neutral rotation) 2x12  Side lying serratus punches 2x12  IR walkouts 2x12 [place] TB  ER walkouts 2x12 [place] TB    Medardo participated in dynamic functional therapeutic activities to improve functional performance for 0 minutes, including:        Home Exercises Provided and Patient Education Provided     Education provided:   - Importance of protecting the repaired shoulder and following precautions (ex. Lifting, using shoulder, improper use of sling)  - Adhesive Capsulitis     Written Home Exercises Provided: Patient instructed to cont prior HEP.  Exercises were reviewed and Medardo was able to demonstrate them prior to the end of the session.  Medardo demonstrated good  understanding of the education provided.     See EMR under Patient Instructions for exercises provided 10/21/2022.    Assessment     Medardo continues to reports slight discomfort at end range shoulder flexion. Pt is demonstrating increased ability perform active shoulder flexion but cannot perform shoulder flexion with compensations. Pt continues to be fatigued after treatment session but does not have pain with the exercises. Pt does report occasionally crepitus with certain shoulder motions that is slightly discomforting.  Pt continues to required verbal and tactile cues for scapular stabilization during isometrics and with theraband walk outs.     Medardo Is progressing well towards his goals.   Pt prognosis is Good.     Pt will continue to benefit from skilled outpatient physical therapy to address the deficits listed in the problem list box on initial evaluation, provide  pt/family education and to maximize pt's level of independence in the home and community environment.     Pt's spiritual, cultural and educational needs considered and pt agreeable to plan of care and goals.     Anticipated barriers to physical therapy: none    Goals:   Short Term Goals (12 Weeks):   1. Pt will be independent with HEP to supplement PT in improving functional use of R UE. (MET)   2. Pt will increase pain free L shoulder elevation PROM to >/= 160 deg to improve functional mobility of UE (Progressing)  3. Pt will increase L shoulder ER PROM in 90 deg abduction to >/=20 deg to improve functional mobility of UE (Progressing)  Long Term Goals (24 Weeks):   1. Pt will improve FOTO score to </=TBD% limited to decrease perceived limitation with carrying, moving, and handling objects.  2. Pt will increase L shoulder PROM to WNL in all planes to improve functional use of R RUE.  3. Pt will increase L shoulder AROM to WFL in all planes to improve functional use of R RUE.  4. Pt will improve L shoulder MMTs to = 5/5 to promote equal use of B UEs in performing functional tasks.  5. Pt will lift 10 lb objects without pain to promote functional QOL.  6. Pt to report pain </= 0/10 with ADLs and IADLs using L UE to improve functional QOL.     Plan     Continue to progress GH and ST joint mobility, gradually increase shoulder ROM, scapular muscle control     Julius Varner, PT

## 2022-11-28 ENCOUNTER — CLINICAL SUPPORT (OUTPATIENT)
Dept: REHABILITATION | Facility: HOSPITAL | Age: 52
End: 2022-11-28
Payer: COMMERCIAL

## 2022-11-28 DIAGNOSIS — M25.612 DECREASED RANGE OF MOTION OF SHOULDER, LEFT: Primary | ICD-10-CM

## 2022-11-28 PROCEDURE — 97110 THERAPEUTIC EXERCISES: CPT | Mod: PO

## 2022-11-28 PROCEDURE — 97140 MANUAL THERAPY 1/> REGIONS: CPT | Mod: PO

## 2022-11-28 PROCEDURE — 97112 NEUROMUSCULAR REEDUCATION: CPT | Mod: PO

## 2022-11-28 NOTE — PROGRESS NOTES
Physical Therapy Daily Treatment Note     Name: Medardo Vizcaino III  Clinic Number: [id number]    Therapy Diagnosis:   Encounter Diagnosis   Name Primary?    Decreased range of motion of shoulder, left Yes       Physician: Erik Alarcon PA-C    Visit Date: 11/28/2022    Physician Orders: PT Eval and Treat   Medical Diagnosis from Referral: M75.122 (ICD-10-CM) - Nontraumatic complete tear of left rotator cuff  Evaluation Date: 10/6/2022  Authorization Period Expiration: 12/31/2022  Plan of Care Expiration: 4/6/2022  Progress Note Due: 11/6/2022  Visit # / Visits authorized: 8/ 29  FOTO: 2/5    1st FOTO Follow up:   2nd FOTO Follow up:     Time In: 1235pm  Time Out: 135pm  Total Billable Time: 60 minutes    Precautions: Standard and and s/p L medium RTC repair on 10/3/2022    Procedure(s) (LRB):  REPAIR, ROTATOR CUFF, ARTHROSCOPIC (Left)  FIXATION, TENDON (Left)  ARTHROSCOPY, SHOULDER, WITH SUBACROMIAL SPACE DECOMPRESSION (Left)  DEBRIDEMENT, SHOULDER, ARTHROSCOPIC-LABRUM (Left)    Subjective     Pt reports: that his exercises are going well at home. He has some discomfort at the front of his shoulder but it is low level. He does feel a good amount of popping in his shoulder that is uncomfortable.     He was compliant with home exercise program.  Response to previous treatment: decreased pain  Functional change: ongoing    Pain: 0/10  Location: left shoulder      Objective       Shoulder Left     PROM   Flexion 155   Abduction 90   External Rotation @90 Abduction 85       Medardo received therapeutic exercises to develop strength, endurance, and ROM for 15 minutes including:    Dowel ER 2x15  Seated thoracic spine extension over a chair, 2 x 10  Pulleys for flexion 2x 20  Side Lying Shoulder Flexion with Dowel 2x15    Medardo received the following manual therapy techniques: Joint mobilizations were applied to the: left shoulder for 15 minutes, including:    GH posterior glides Grade III  GH inferior Glides  "Grade III  GH inferior mobs with shoulder at 90, grade III  AC and SC joint mobs in all directions, grade III  Prone mid thoracic spine PA mobs, grade IV and V    Medardo participated in neuromuscular re-education activities to improve: Coordination, Kinesthetic, and Proprioception for 30 minutes. The following activities were included:    Rhythmic stabilizations with PT resistance, 2 x 30" in scapular plane and 2 x 30" with shoulder at 90  IR walkouts 2x12 [place] TB  ER walkouts 2x12 [place] TB  Side lying serratus punches with YTB for resistance, 3 x 8  Serratus wall slides, 3 x 6  Prone Scapular retraction with shoulder at 90, 3 x 8  Prone row to neutral, 2 x 10  Hitchhikers with HOB elevated, 2 x 10 with 3" holds    Medardo participated in dynamic functional therapeutic activities to improve functional performance for 0 minutes, including:        Home Exercises Provided and Patient Education Provided     Education provided:   - Importance of protecting the repaired shoulder and following precautions (ex. Lifting, using shoulder, improper use of sling)  - Adhesive Capsulitis     Written Home Exercises Provided: Patient instructed to cont prior HEP.  Exercises were reviewed and Medardo was able to demonstrate them prior to the end of the session.  Medardo demonstrated good  understanding of the education provided.     See EMR under Patient Instructions for exercises provided 10/21/2022.    Assessment     Meadrdo displays good GH joint mobility in the posterior and inferior direction. His ER ROM is progressing very well. He has some trouble tolerating PROM into flexion so he was allowed to do this with the pulleys and he is nearing full flexion ROM at this time. He was further challenged with serratus anterior control with wall slides today and reported no discomfort, only muscle fatigue at the end of the exercise. Rotator cuff activation activities continue to be performed at this time.     Medardo Is " progressing well towards his goals.   Pt prognosis is Good.     Pt will continue to benefit from skilled outpatient physical therapy to address the deficits listed in the problem list box on initial evaluation, provide pt/family education and to maximize pt's level of independence in the home and community environment.     Pt's spiritual, cultural and educational needs considered and pt agreeable to plan of care and goals.     Anticipated barriers to physical therapy: none    Goals:   Short Term Goals (12 Weeks):   1. Pt will be independent with HEP to supplement PT in improving functional use of R UE. (MET)   2. Pt will increase pain free L shoulder elevation PROM to >/= 160 deg to improve functional mobility of UE (Progressing)  3. Pt will increase L shoulder ER PROM in 90 deg abduction to >/=20 deg to improve functional mobility of UE (Progressing)  Long Term Goals (24 Weeks):   1. Pt will improve FOTO score to </=TBD% limited to decrease perceived limitation with carrying, moving, and handling objects.  2. Pt will increase L shoulder PROM to WNL in all planes to improve functional use of R RUE.  3. Pt will increase L shoulder AROM to WFL in all planes to improve functional use of R RUE.  4. Pt will improve L shoulder MMTs to = 5/5 to promote equal use of B UEs in performing functional tasks.  5. Pt will lift 10 lb objects without pain to promote functional QOL.  6. Pt to report pain </= 0/10 with ADLs and IADLs using L UE to improve functional QOL.     Plan     Continue to progress GH and ST joint mobility, gradually increase shoulder ROM, scapular muscle control     IZAIAH DAVIDSON, PT

## 2022-11-30 NOTE — PROGRESS NOTES
Physical Therapy Daily Treatment Note     Name: Medardo Vizcaino III  Clinic Number: [id number]    Therapy Diagnosis:   Encounter Diagnosis   Name Primary?    Decreased range of motion of shoulder, left Yes       Physician: Erik Alarcon PA-C    Visit Date: 12/1/2022    Physician Orders: PT Eval and Treat   Medical Diagnosis from Referral: M75.122 (ICD-10-CM) - Nontraumatic complete tear of left rotator cuff  Evaluation Date: 10/6/2022  Authorization Period Expiration: 12/31/2022  Plan of Care Expiration: 4/6/2022  Progress Note Due: 11/6/2022  Visit # / Visits authorized: 9/ 29  FOTO: 2/5    1st FOTO Follow up:   2nd FOTO Follow up:     Time In: 1048am  Time Out: 1038am  Total Billable Time: 50 minutes    Precautions: Standard and and s/p L medium RTC repair on 10/3/2022    Procedure(s) (LRB):  REPAIR, ROTATOR CUFF, ARTHROSCOPIC (Left)  FIXATION, TENDON (Left)  ARTHROSCOPY, SHOULDER, WITH SUBACROMIAL SPACE DECOMPRESSION (Left)  DEBRIDEMENT, SHOULDER, ARTHROSCOPIC-LABRUM (Left)    Subjective     Pt reports: that he has no new complaints of pain at this time. The progressions at his treatment session earlier this week went well and caused no increase in his pain.     He was compliant with home exercise program.  Response to previous treatment: decreased pain  Functional change: ongoing    Pain: 0/10  Location: left shoulder      Objective       Shoulder Left     PROM   Flexion 155   Abduction 90   External Rotation @90 Abduction 85       Medardo received therapeutic exercises to develop strength, endurance, and ROM for 10 minutes including:    Seated thoracic spine extension over a chair, 2 x 10  Pulleys for flexion 2x 20  Side Lying Shoulder Flexion with Dowel 3 x 10    Medardo received the following manual therapy techniques: Joint mobilizations were applied to the: left shoulder for 15 minutes, including:    GH posterior glides Grade III  GH inferior Glides Grade III  GH inferior mobs with shoulder at 90,  "grade III  AC and SC joint mobs in all directions, grade III  Supine thoracic spine manipulation (L arm down by his side), grade V    Medardo participated in neuromuscular re-education activities to improve: Coordination, Kinesthetic, and Proprioception for 25 minutes. The following activities were included:    Rhythmic stabilizations with PT resistance, 2 x 30" in scapular plane and 2 x 30" with shoulder at 90  IR walkouts 2x12 [place] TB  ER walkouts 2x12 [place] TB  Side lying serratus punches with YTB for resistance, 3 x 8  Serratus wall slides, 3 x 6  Supine serratus punches, 15x  Prone Scapular retraction with shoulder at 90, 3 x 8 (not today)  Hitchhikers with HOB elevated, 2 x 10 with 3" holds    Medardo participated in dynamic functional therapeutic activities to improve functional performance for 0 minutes, including:      Home Exercises Provided and Patient Education Provided     Education provided:   - Importance of protecting the repaired shoulder and following precautions (ex. Lifting, using shoulder, improper use of sling)  - Adhesive Capsulitis     Written Home Exercises Provided: Patient instructed to cont prior HEP.  Exercises were reviewed and Medardo was able to demonstrate them prior to the end of the session.  Medardo demonstrated good  understanding of the education provided.     See EMR under Patient Instructions for exercises provided 10/21/2022.    Assessment     Medardo was progressed with higher graded GH joint mobilizations today to ensure his mobility is maintained. He continues to be challenged with rotator cuff activation exercise. Scapular upward rotation retraining was progressed today with good tolerance. His HEP was updated today to include more rotator cuff and serratus anterior exercises.     Medardo Is progressing well towards his goals.   Pt prognosis is Good.     Pt will continue to benefit from skilled outpatient physical therapy to address the deficits listed in the " problem list box on initial evaluation, provide pt/family education and to maximize pt's level of independence in the home and community environment.     Pt's spiritual, cultural and educational needs considered and pt agreeable to plan of care and goals.     Anticipated barriers to physical therapy: none    Goals:   Short Term Goals (12 Weeks):   1. Pt will be independent with HEP to supplement PT in improving functional use of R UE. (MET)   2. Pt will increase pain free L shoulder elevation PROM to >/= 160 deg to improve functional mobility of UE (Progressing)  3. Pt will increase L shoulder ER PROM in 90 deg abduction to >/=20 deg to improve functional mobility of UE (Progressing)  Long Term Goals (24 Weeks):   1. Pt will improve FOTO score to </=TBD% limited to decrease perceived limitation with carrying, moving, and handling objects.  2. Pt will increase L shoulder PROM to WNL in all planes to improve functional use of R RUE.  3. Pt will increase L shoulder AROM to WFL in all planes to improve functional use of R RUE.  4. Pt will improve L shoulder MMTs to = 5/5 to promote equal use of B UEs in performing functional tasks.  5. Pt will lift 10 lb objects without pain to promote functional QOL.  6. Pt to report pain </= 0/10 with ADLs and IADLs using L UE to improve functional QOL.     Plan     Continue to progress GH and ST joint mobility, gradually increase shoulder ROM, scapular muscle control     IZAIAH DAVIDSON, PT

## 2022-12-01 ENCOUNTER — CLINICAL SUPPORT (OUTPATIENT)
Dept: REHABILITATION | Facility: HOSPITAL | Age: 52
End: 2022-12-01
Payer: COMMERCIAL

## 2022-12-01 DIAGNOSIS — M25.612 DECREASED RANGE OF MOTION OF SHOULDER, LEFT: Primary | ICD-10-CM

## 2022-12-01 PROCEDURE — 97110 THERAPEUTIC EXERCISES: CPT | Mod: PO

## 2022-12-01 PROCEDURE — 97112 NEUROMUSCULAR REEDUCATION: CPT | Mod: PO

## 2022-12-01 PROCEDURE — 97140 MANUAL THERAPY 1/> REGIONS: CPT | Mod: PO

## 2022-12-05 ENCOUNTER — CLINICAL SUPPORT (OUTPATIENT)
Dept: REHABILITATION | Facility: HOSPITAL | Age: 52
End: 2022-12-05
Payer: COMMERCIAL

## 2022-12-05 DIAGNOSIS — M25.612 DECREASED RANGE OF MOTION OF SHOULDER, LEFT: Primary | ICD-10-CM

## 2022-12-05 PROCEDURE — 97140 MANUAL THERAPY 1/> REGIONS: CPT | Mod: PO

## 2022-12-05 PROCEDURE — 97110 THERAPEUTIC EXERCISES: CPT | Mod: PO

## 2022-12-05 PROCEDURE — 97112 NEUROMUSCULAR REEDUCATION: CPT | Mod: PO

## 2022-12-05 NOTE — PROGRESS NOTES
Physical Therapy Daily Treatment Note     Name: Medardo Vizcaino [facility]  Clinic Number: [id number]    Therapy Diagnosis:   Encounter Diagnosis   Name Primary?    Decreased range of motion of shoulder, left Yes     Physician: Erik Alarcon PA-C    Visit Date: 12/5/2022    Physician Orders: PT Eval and Treat   Medical Diagnosis from Referral: M75.122 (ICD-10-CM) - Nontraumatic complete tear of left rotator cuff  Evaluation Date: 10/6/2022  Authorization Period Expiration: 12/31/2022  Plan of Care Expiration: 4/6/2022  Progress Note Due: 11/6/2022  Visit # / Visits authorized: 10/ 29  FOTO: 2/5    1st FOTO Follow up:   2nd FOTO Follow up:     Time In: 1045am  Time Out: 1142am  Total Billable Time: 52 minutes    Precautions: Standard and and s/p L medium RTC repair on 10/3/2022    Procedure(s) (LRB):  REPAIR, ROTATOR CUFF, ARTHROSCOPIC (Left)  FIXATION, TENDON (Left)  ARTHROSCOPY, SHOULDER, WITH SUBACROMIAL SPACE DECOMPRESSION (Left)  DEBRIDEMENT, SHOULDER, ARTHROSCOPIC-LABRUM (Left)    Subjective     Pt reports: that is still having a lot of trouble sleeping when he lies on either side. He can not stay comfortable for too long. He is noticing continued weakness and is curious is treatment is too conservative at this time.     He was compliant with home exercise program.  Response to previous treatment: decreased pain  Functional change: ongoing    Pain: 0/10  Location: left shoulder      Objective       Shoulder Left     PROM   Flexion 155   Abduction 90   External Rotation @90 Abduction 85       Medardo received therapeutic exercises to develop strength, endurance, and ROM for 15 minutes including:    Supine shoulder flexion AAROM with dowel in both hands, 3 x 8  Seated thoracic spine extension over a chair, 2 x 10  Pulleys for flexion and scaption, 2 x 10 each  Side Lying Shoulder Flexion with Dowel + YTB, 3 x 10    Medardo received the following manual therapy techniques: Joint mobilizations were applied to  "the: left shoulder for 12 minutes, including:    GH posterior glides Grade III  GH inferior Glides Grade III  GH inferior mobs with shoulder at 90, grade III  AC and SC joint mobs in all directions, grade III  Supine thoracic spine manipulation (L arm down by his side), grade V    Medardo participated in neuromuscular re-education activities to improve: Coordination, Kinesthetic, and Proprioception for 25 minutes. The following activities were included:    Rhythmic stabilizations with PT resistance, 3 x 30" with shoulder at 90  IR walkouts 2x12 [place]  ER walkouts 2x12 OTB  Serratus wall slides, 3 x 8  Prone Scapular retraction with shoulder at 90, 3 x 8  Hitchhikers with HOB elevated, 2 x 10 with 3" holds    Medardo participated in dynamic functional therapeutic activities to improve functional performance for 0 minutes, including:      Home Exercises Provided and Patient Education Provided     Education provided:   - Importance of protecting the repaired shoulder and following precautions (ex. Lifting, using shoulder, improper use of sling)  - Adhesive Capsulitis     Written Home Exercises Provided: Patient instructed to cont prior HEP.  Exercises were reviewed and Medardo was able to demonstrate them prior to the end of the session.  Medardo demonstrated good  understanding of the education provided.     See EMR under Patient Instructions for exercises provided 10/21/2022.    Assessment     Medardo was educated today that at this time he will be gradually progressed with his rotator cuff loading as he demonstrates strength improvements. His passive motion at this time is approaching full into flexion and ER so AAROM against gravity was added today. He had no pain with this progression today. Walkouts were progressed to orange resistance band with good tolerance. Scapular control exercises continue to be performed.     Medardo Is progressing well towards his goals.   Pt prognosis is Good.     Pt will " continue to benefit from skilled outpatient physical therapy to address the deficits listed in the problem list box on initial evaluation, provide pt/family education and to maximize pt's level of independence in the home and community environment.     Pt's spiritual, cultural and educational needs considered and pt agreeable to plan of care and goals.     Anticipated barriers to physical therapy: none    Goals:   Short Term Goals (12 Weeks):   1. Pt will be independent with HEP to supplement PT in improving functional use of R UE. (MET)   2. Pt will increase pain free L shoulder elevation PROM to >/= 160 deg to improve functional mobility of UE (Progressing)  3. Pt will increase L shoulder ER PROM in 90 deg abduction to >/=20 deg to improve functional mobility of UE (Progressing)  Long Term Goals (24 Weeks):   1. Pt will improve FOTO score to </=TBD% limited to decrease perceived limitation with carrying, moving, and handling objects.  2. Pt will increase L shoulder PROM to WNL in all planes to improve functional use of R RUE.  3. Pt will increase L shoulder AROM to WFL in all planes to improve functional use of R RUE.  4. Pt will improve L shoulder MMTs to = 5/5 to promote equal use of B UEs in performing functional tasks.  5. Pt will lift 10 lb objects without pain to promote functional QOL.  6. Pt to report pain </= 0/10 with ADLs and IADLs using L UE to improve functional QOL.     Plan     Continue to progress GH and ST joint mobility, gradually increase shoulder ROM, scapular muscle control     IZAIAH DAVIDSON, PT

## 2022-12-08 ENCOUNTER — CLINICAL SUPPORT (OUTPATIENT)
Dept: REHABILITATION | Facility: HOSPITAL | Age: 52
End: 2022-12-08
Attending: PHYSICIAN ASSISTANT
Payer: COMMERCIAL

## 2022-12-08 DIAGNOSIS — M25.612 DECREASED RANGE OF MOTION OF SHOULDER, LEFT: Primary | ICD-10-CM

## 2022-12-08 PROCEDURE — 97140 MANUAL THERAPY 1/> REGIONS: CPT | Mod: PO

## 2022-12-08 PROCEDURE — 97112 NEUROMUSCULAR REEDUCATION: CPT | Mod: PO

## 2022-12-08 PROCEDURE — 97110 THERAPEUTIC EXERCISES: CPT | Mod: PO

## 2022-12-08 NOTE — PROGRESS NOTES
Physical Therapy Daily Treatment Note     Name: Medardo Vizcaino [facility]  Clinic Number: [id number]    Therapy Diagnosis:   Encounter Diagnosis   Name Primary?    Decreased range of motion of shoulder, left Yes       Physician: Erik Alarcon PA-C    Visit Date: 12/8/2022    Physician Orders: PT Eval and Treat   Medical Diagnosis from Referral: M75.122 (ICD-10-CM) - Nontraumatic complete tear of left rotator cuff  Evaluation Date: 10/6/2022  Authorization Period Expiration: 12/31/2022  Plan of Care Expiration: 4/6/2022  Progress Note Due: 12/15/2023  Visit # / Visits authorized: 11/ 29  FOTO: 2/5    1st FOTO Follow up:   2nd FOTO Follow up:     Time In: 1058am  Time Out: 1155am  Total Billable Time: 52 minutes    Precautions: Standard and and s/p L medium RTC repair on 10/3/2022    Procedure(s) (LRB):  REPAIR, ROTATOR CUFF, ARTHROSCOPIC (Left)  FIXATION, TENDON (Left)  ARTHROSCOPY, SHOULDER, WITH SUBACROMIAL SPACE DECOMPRESSION (Left)  DEBRIDEMENT, SHOULDER, ARTHROSCOPIC-LABRUM (Left)    Subjective     Pt reports: that is exercises really doing cause any pain, its just the sleeping that is bothersome. Most of his pain is also at the front of his shoulder.     He was compliant with home exercise program.  Response to previous treatment: decreased pain  Functional change: ongoing    Pain: 0/10  Location: left shoulder      Objective       Shoulder Left     PROM   Flexion 155   Abduction 90   External Rotation @90 Abduction 85       Medardo received therapeutic exercises to develop strength, endurance, and ROM for 14 minutes including:    LLLD inferior capsular stretch with arm resting on bolster + 3#, 5 minutes  Seated thoracic spine extension over a chair, 2 x 10  Side Lying Shoulder Flexion (no dowel today), 3 x 8  Prone scapular retractions with hands resting on the back of the head, 2 x 8    Medardo received the following manual therapy techniques: Joint mobilizations were applied to the: left shoulder for 12  "minutes, including:    GH posterior glides Grade III  GH inferior Glides Grade III  GH inferior mobs with shoulder at 90, grade III  AC and SC joint mobs in all directions, grade III  Supine thoracic spine manipulation (L arm down by his side), grade V    Medardo participated in neuromuscular re-education activities to improve: Coordination, Kinesthetic, and Proprioception for 26 minutes. The following activities were included:    Rhythmic stabilizations with PT resistance, 3 x 30" with shoulder at 90  Supine active shoulder flexion with PT rhythmic stabilization, 2 x 4  Side lying scapular protraction with hand on dowel chas, 3 x 8  IR walkouts 15x OTB  ER walkouts 15x OTB  Serratus wall slides, 3 x 8  Prone Scapular retraction with shoulder at 90, 3 x 8  Hitchhikers with HOB elevated, 2 x 10 with 3" holds    Medardo participated in dynamic functional therapeutic activities to improve functional performance for 0 minutes, including:      Home Exercises Provided and Patient Education Provided     Education provided:   - Importance of protecting the repaired shoulder and following precautions (ex. Lifting, using shoulder, improper use of sling)  - Adhesive Capsulitis     Written Home Exercises Provided: Patient instructed to cont prior HEP.  Exercises were reviewed and Medardo was able to demonstrate them prior to the end of the session.  Medardo demonstrated good  understanding of the education provided.     See EMR under Patient Instructions for exercises provided 10/21/2022.    Assessment     Medardo continues with good GH joint mobility in the posterior directions but is slightly limited with with inferior joint mobility so a low load long duration stretch was performed today. He continues to be challenged with rotator cuff activation exercises. He was progressed to some AROM exercises against gravity today with good tolerance. He struggles more with eccentric control compared to concentric. He lacks " some scapular upward rotation actively so her serratus anterior exercises were progressed.     Medardo Is progressing well towards his goals.   Pt prognosis is Good.     Pt will continue to benefit from skilled outpatient physical therapy to address the deficits listed in the problem list box on initial evaluation, provide pt/family education and to maximize pt's level of independence in the home and community environment.     Pt's spiritual, cultural and educational needs considered and pt agreeable to plan of care and goals.     Anticipated barriers to physical therapy: none    Goals:   Short Term Goals (12 Weeks):   1. Pt will be independent with HEP to supplement PT in improving functional use of R UE. (MET)   2. Pt will increase pain free L shoulder elevation PROM to >/= 160 deg to improve functional mobility of UE (Progressing)  3. Pt will increase L shoulder ER PROM in 90 deg abduction to >/=20 deg to improve functional mobility of UE (Progressing)  Long Term Goals (24 Weeks):   1. Pt will improve FOTO score to </=TBD% limited to decrease perceived limitation with carrying, moving, and handling objects.  2. Pt will increase L shoulder PROM to WNL in all planes to improve functional use of R RUE.  3. Pt will increase L shoulder AROM to WFL in all planes to improve functional use of R RUE.  4. Pt will improve L shoulder MMTs to = 5/5 to promote equal use of B UEs in performing functional tasks.  5. Pt will lift 10 lb objects without pain to promote functional QOL.  6. Pt to report pain </= 0/10 with ADLs and IADLs using L UE to improve functional QOL.     Plan     Continue to progress GH and ST joint mobility, gradually increase shoulder ROM, scapular muscle control     IZAIAH DAVIDSON, PT

## 2022-12-12 ENCOUNTER — CLINICAL SUPPORT (OUTPATIENT)
Dept: REHABILITATION | Facility: HOSPITAL | Age: 52
End: 2022-12-12
Payer: COMMERCIAL

## 2022-12-12 DIAGNOSIS — M25.612 DECREASED RANGE OF MOTION OF SHOULDER, LEFT: Primary | ICD-10-CM

## 2022-12-12 PROCEDURE — 97140 MANUAL THERAPY 1/> REGIONS: CPT | Mod: PO

## 2022-12-12 PROCEDURE — 97110 THERAPEUTIC EXERCISES: CPT | Mod: PO

## 2022-12-12 PROCEDURE — 97112 NEUROMUSCULAR REEDUCATION: CPT | Mod: PO

## 2022-12-12 NOTE — PROGRESS NOTES
Physical Therapy Daily Treatment and Progress Note     Name: Medardo Vizcaino [facility]  Clinic Number: [id number]    Therapy Diagnosis:   Encounter Diagnosis   Name Primary?    Decreased range of motion of shoulder, left Yes       Physician: Erik Alarcon PA-C    Visit Date: 12/12/2022    Physician Orders: PT Eval and Treat   Medical Diagnosis from Referral: M75.122 (ICD-10-CM) - Nontraumatic complete tear of left rotator cuff  Evaluation Date: 10/6/2022  Authorization Period Expiration: 12/31/2022  Plan of Care Expiration: 4/6/2022  Progress Note Due: 12/15/2023  Visit # / Visits authorized: 13/ 29  FOTO: 3/5    1st FOTO Follow up:   2nd FOTO Follow up:     Time In: 10:40  Time Out: 11:41  Total Billable Time: 61 minutes    Precautions: Standard and and s/p L medium RTC repair on 10/3/2022    Procedure(s) (LRB):  REPAIR, ROTATOR CUFF, ARTHROSCOPIC (Left)  FIXATION, TENDON (Left)  ARTHROSCOPY, SHOULDER, WITH SUBACROMIAL SPACE DECOMPRESSION (Left)  DEBRIDEMENT, SHOULDER, ARTHROSCOPIC-LABRUM (Left)    Subjective     Pt reports: that he is still having trouble sleeping due to shoulder and arm pain. He still feels as if his shoulder is weak.     He was compliant with home exercise program.  Response to previous treatment: decreased pain  Functional change: ongoing    Pain: 0/10  Location: left shoulder      Objective        Shoulder Left Left Pain/Dysfunction with Movement     AROM PROM     Flexion 140 160     Abduction 85 90     External Rotation @90 Abduction 80 80     Internal Rotation @90  Abduction  20 20      Neural Tension Test: Negative for Ulnar, radial, median    Medardo received therapeutic exercises to develop strength, endurance, and ROM for 19 minutes including:    Assessment as above  LLLD inferior capsular stretch with arm resting on bolster + 3#, 5 minutes  Pulleys flexion 20x    Not today  Seated thoracic spine extension over a chair, 2 x 10  Side Lying Shoulder Flexion (no dowel today), 3 x 8  Prone  "scapular retractions with hands resting on the back of the head, 2 x 8    Medardo received the following manual therapy techniques: Joint mobilizations were applied to the: left shoulder for 17 minutes, including:    GH posterior glides Grade III  GH inferior Glides Grade III  GH inferior mobs with shoulder at 90, grade III  AC and SC joint mobs in all directions, grade III  Posterior Shoulder Capsule MET 5x5    Medardo participated in neuromuscular re-education activities to improve: Coordination, Kinesthetic, and Proprioception for 25 minutes. The following activities were included:    Upper Trap Level 3 2x12  Prone Mid Trap Level 2 2x12  Prone Lower trap Level 1 2x12  Hand Heel rocks on wall 2x12  Rhythmic stabilizations with PT resistance, 3 x 30" with shoulder at 90    Not today  Supine active shoulder flexion with PT rhythmic stabilization, 2 x 4  Side lying scapular protraction with hand on dowel chas, 3 x 8  IR walkouts 15x OTB  ER walkouts 15x OTB  Serratus wall slides, 3 x 8  Hitchhikers with HOB elevated, 2 x 10 with 3" holds    Medardo participated in dynamic functional therapeutic activities to improve functional performance for 0 minutes, including:      Home Exercises Provided and Patient Education Provided     Education provided:   - Importance of protecting the repaired shoulder and following precautions (ex. Lifting, using shoulder, improper use of sling)  - Adhesive Capsulitis     Written Home Exercises Provided: Patient instructed to cont prior HEP.  Exercises were reviewed and Medardo was able to demonstrate them prior to the end of the session.  Medardo demonstrated good  understanding of the education provided.     See EMR under Patient Instructions for exercises provided 10/21/2022.    Assessment     Medardo presents to physical therapy for progress note. He is still lacking shoulder range of motion measured in prone, but does exhibit greater range of motion when scapula is free to " move. He has decreased upward rotation determined by inferior angle of scapula not reaching mid axillary line. Pt was very fatigued after treatment session and required verbal and tactile cues to perform scapular stabilizer exercises correctly. He continues to be challenged with exercises and has visible upper extremity fatigue after session. Plan to continue to progress patient as tolerated      Medardo Is progressing well towards his goals.   Pt prognosis is Good.     Pt will continue to benefit from skilled outpatient physical therapy to address the deficits listed in the problem list box on initial evaluation, provide pt/family education and to maximize pt's level of independence in the home and community environment.     Pt's spiritual, cultural and educational needs considered and pt agreeable to plan of care and goals.     Anticipated barriers to physical therapy: none    Goals:   Short Term Goals (12 Weeks):   1. Pt will be independent with HEP to supplement PT in improving functional use of R UE. (MET)   2. Pt will increase pain free L shoulder elevation PROM to >/= 160 deg to improve functional mobility of UE (Progressing)  3. Pt will increase L shoulder ER PROM in 90 deg abduction to >/=20 deg to improve functional mobility of UE (Progressing)  Long Term Goals (24 Weeks):   1. Pt will improve FOTO score to </=TBD% limited to decrease perceived limitation with carrying, moving, and handling objects.  2. Pt will increase L shoulder PROM to WNL in all planes to improve functional use of R RUE.  3. Pt will increase L shoulder AROM to WFL in all planes to improve functional use of R RUE.  4. Pt will improve L shoulder MMTs to = 5/5 to promote equal use of B UEs in performing functional tasks.  5. Pt will lift 10 lb objects without pain to promote functional QOL.  6. Pt to report pain </= 0/10 with ADLs and IADLs using L UE to improve functional QOL.     Plan     Continue to progress GH and ST joint mobility,  gradually increase shoulder ROM, scapular muscle control     Julius Varner, ABBE

## 2022-12-14 NOTE — PROGRESS NOTES
Physical Therapy Daily Treatment Note     Name: Medardo Vizcaino [facility]  Clinic Number: [id number]    Therapy Diagnosis:   Encounter Diagnosis   Name Primary?    Decreased range of motion of shoulder, left Yes       Physician: Erik Alarcon PA-C    Visit Date: 12/15/2022    Physician Orders: PT Eval and Treat   Medical Diagnosis from Referral: M75.122 (ICD-10-CM) - Nontraumatic complete tear of left rotator cuff  Evaluation Date: 10/6/2022  Authorization Period Expiration: 12/31/2022  Plan of Care Expiration: 4/6/2022  Progress Note Due: 1/12/2023  Visit # / Visits authorized: 13/ 29  FOTO: 3/5    1st FOTO Follow up:   2nd FOTO Follow up:     Time In: 1100am  Time Out: 1155am  Total Billable Time: 50 minutes    Precautions: Standard and and s/p L medium RTC repair on 10/3/2022    Procedure(s) (LRB):  REPAIR, ROTATOR CUFF, ARTHROSCOPIC (Left)  FIXATION, TENDON (Left)  ARTHROSCOPY, SHOULDER, WITH SUBACROMIAL SPACE DECOMPRESSION (Left)  DEBRIDEMENT, SHOULDER, ARTHROSCOPIC-LABRUM (Left)    Subjective     Pt reports: pain is mostly well controlled until he tries to go to sleep. He just notices that the strength is not full back yet.     He was compliant with home exercise program.  Response to previous treatment: decreased pain  Functional change: ongoing    Pain: 0/10  Location: left shoulder      Objective        Shoulder Left Left Pain/Dysfunction with Movement     AROM PROM     Flexion 140 160     Abduction 85 90     External Rotation @90 Abduction 80 80     Internal Rotation @90  Abduction  20 20        Medardo received therapeutic exercises to develop strength, endurance, and ROM for 12 minutes including:    Assessment as above  LLLD inferior capsular stretch with arm resting on bolster + 4#, 6 minutes  Seated thoracic spine extension over a chair, 2 x 10    Not today  Side Lying Shoulder Flexion (no dowel today), 3 x 8  Prone scapular retractions with hands resting on the back of the head, 2 x 8    Medardo  "received the following manual therapy techniques: Joint mobilizations were applied to the: left shoulder for 3 minutes, including:    GH posterior glides Grade III  GH inferior Glides Grade III  GH inferior mobs with shoulder at 90, grade III  AC and SC joint mobs in all directions, grade III  Posterior Shoulder Capsule MET 5x5    Medardo participated in neuromuscular re-education activities to improve: Coordination, Kinesthetic, and Proprioception for 35 minutes. The following activities were included:    IR walkouts 2 x 10 OTB (arm at 45 deg flexion)  ER walkouts 2 x 10 OTB (arm at 45 deg flexion)  Prone Mid Trap Level 2, 2 x 8  Prone Lower trap Level 2, 2 x 8  Serratus wall slides, 3 x 8  Hitchhikers + controlled arm lift, 2 x 10 with 3" holds  Supine active shoulder flexion with PT rhythmic stabilization, 2 x 4  Rhythmic stabilizations with PT resistance, 3 x 30" with shoulder at 90    Not today:  Upper Trap Level 3 2x12  Side lying scapular protraction with hand on dowel chas, 3 x 8      Medardo participated in dynamic functional therapeutic activities to improve functional performance for 0 minutes, including:      Home Exercises Provided and Patient Education Provided     Education provided:   - Importance of protecting the repaired shoulder and following precautions (ex. Lifting, using shoulder, improper use of sling)  - Adhesive Capsulitis     Written Home Exercises Provided: Patient instructed to cont prior HEP.  Exercises were reviewed and Medardo was able to demonstrate them prior to the end of the session.  Medardo demonstrated good  understanding of the education provided.     See EMR under Patient Instructions for exercises provided 10/21/2022.    Assessment     Medardo continues with good PROM in all direction throughout his L shoulder. Actively his shoulder flexion is about 150 degrees. He also displays no shrug sign with his active motion. He does continue with scapular  deficits " into upward rotation but the appropriate periscapular strengthening is being performed at this time. He was further progressed with his walkouts today as they were performed at 45 degrees of shoulder flexion to further load his rotator cuff. He was educated that at this time he should still refrain from riding his bike, but his 12 week follow up appointment with his surgeon is soon so he was informed to talk with his surgeon about this.     Medardo Is progressing well towards his goals.   Pt prognosis is Good.     Pt will continue to benefit from skilled outpatient physical therapy to address the deficits listed in the problem list box on initial evaluation, provide pt/family education and to maximize pt's level of independence in the home and community environment.     Pt's spiritual, cultural and educational needs considered and pt agreeable to plan of care and goals.     Anticipated barriers to physical therapy: none    Goals:   Short Term Goals (12 Weeks):   1. Pt will be independent with HEP to supplement PT in improving functional use of R UE. (MET)   2. Pt will increase pain free L shoulder elevation PROM to >/= 160 deg to improve functional mobility of UE (Progressing)  3. Pt will increase L shoulder ER PROM in 90 deg abduction to >/=20 deg to improve functional mobility of UE (Progressing)  Long Term Goals (24 Weeks):   1. Pt will improve FOTO score to </=TBD% limited to decrease perceived limitation with carrying, moving, and handling objects.  2. Pt will increase L shoulder PROM to WNL in all planes to improve functional use of R RUE.  3. Pt will increase L shoulder AROM to WFL in all planes to improve functional use of R RUE.  4. Pt will improve L shoulder MMTs to = 5/5 to promote equal use of B UEs in performing functional tasks.  5. Pt will lift 10 lb objects without pain to promote functional QOL.  6. Pt to report pain </= 0/10 with ADLs and IADLs using L UE to improve functional QOL.     Plan      Continue to progress GH and ST joint mobility, gradually increase shoulder ROM, scapular muscle control     IZAIAH DAVIDSON, PT

## 2022-12-15 ENCOUNTER — CLINICAL SUPPORT (OUTPATIENT)
Dept: REHABILITATION | Facility: HOSPITAL | Age: 52
End: 2022-12-15
Payer: COMMERCIAL

## 2022-12-15 DIAGNOSIS — M25.612 DECREASED RANGE OF MOTION OF SHOULDER, LEFT: Primary | ICD-10-CM

## 2022-12-15 PROCEDURE — 97110 THERAPEUTIC EXERCISES: CPT | Mod: PO

## 2022-12-15 PROCEDURE — 97112 NEUROMUSCULAR REEDUCATION: CPT | Mod: PO

## 2022-12-19 ENCOUNTER — CLINICAL SUPPORT (OUTPATIENT)
Dept: REHABILITATION | Facility: HOSPITAL | Age: 52
End: 2022-12-19
Payer: COMMERCIAL

## 2022-12-19 DIAGNOSIS — M25.612 DECREASED RANGE OF MOTION OF SHOULDER, LEFT: Primary | ICD-10-CM

## 2022-12-19 PROCEDURE — 97112 NEUROMUSCULAR REEDUCATION: CPT | Mod: PO

## 2022-12-19 PROCEDURE — 97140 MANUAL THERAPY 1/> REGIONS: CPT | Mod: PO

## 2022-12-19 NOTE — PROGRESS NOTES
Physical Therapy Daily Treatment Note     Name: Medardo [facility]  Clinic Number: [id number]    Therapy Diagnosis:   Encounter Diagnosis   Name Primary?    Decreased range of motion of shoulder, left Yes         Physician: Erik Alarcon PA-C    Visit Date: 12/19/2022    Physician Orders: PT Eval and Treat   Medical Diagnosis from Referral: M75.122 (ICD-10-CM) - Nontraumatic complete tear of left rotator cuff  Evaluation Date: 10/6/2022  Authorization Period Expiration: 12/31/2022  Plan of Care Expiration: 4/6/2022  Progress Note Due: 1/12/2023  Visit # / Visits authorized: 14/ 29  FOTO: 4/5    1st FOTO Follow up:   2nd FOTO Follow up:     Time In: 11:01  Time Out: 12:00  Total Billable Time: 59 minutes    Precautions: Standard and and s/p L medium RTC repair on 10/3/2022    Procedure(s) (LRB):  REPAIR, ROTATOR CUFF, ARTHROSCOPIC (Left)  FIXATION, TENDON (Left)  ARTHROSCOPY, SHOULDER, WITH SUBACROMIAL SPACE DECOMPRESSION (Left)  DEBRIDEMENT, SHOULDER, ARTHROSCOPIC-LABRUM (Left)    Subjective     Pt reports: his shoulder feel about the same and he feel like he is slowly progressing    He was compliant with home exercise program.  Response to previous treatment: decreased pain  Functional change: ongoing    Pain: 0/10  Location: left shoulder      Objective        Shoulder Left Left Pain/Dysfunction with Movement     AROM PROM     Flexion 160 170     Abduction 85 90     External Rotation @90 Abduction 80 80     Internal Rotation @90  Abduction  20 20        Medardo received therapeutic exercises to develop strength, endurance, and ROM for 0 minutes including:    Assessment as above  LLLD inferior capsular stretch with arm resting on bolster + 4#, 6 minutes  Seated thoracic spine extension over a chair, 2 x 10    Not today  Side Lying Shoulder Flexion (no dowel today), 3 x 8  Prone scapular retractions with hands resting on the back of the head, 2 x 8    Medardo received the following manual therapy  "techniques: Joint mobilizations were applied to the: left shoulder for 21 minutes, including:    GH posterior glides Grade III  GH inferior Glides Grade III  GH inferior mobs with shoulder at 90, grade III  AC and SC joint mobs in all directions, grade III  AC joint inferior glide with shoulder flexion, grade III  Posterior Shoulder Capsule MET 5x5    Medardo participated in neuromuscular re-education activities to improve: Coordination, Kinesthetic, and Proprioception for 38 minutes. The following activities were included:      Upper Trap Level 3 2x12  Serratus wall slides, 3 x 8 with foam roller   Standing Hand Heel rocks 2x12   Serratus push up 12x  Prone Mid Trap Level 2, 2 x 8  Prone Lower trap Level 2, 2 x 8  IR walkouts 2 x 10 OTB (arm at 90 deg flexion)  ER walkouts 2 x 10 OTB (arm at 90 deg flexion)  Rhythmic stabilizations with PT resistance, 3 x 30" with shoulder at 90  Side lying shoulder flexion with tactile cues 3x5     Not today:  Hitchhikers + controlled arm lift, 2 x 10 with 3" holds  Supine active shoulder flexion with PT rhythmic stabilization, 2 x 4        Medardo participated in dynamic functional therapeutic activities to improve functional performance for 0 minutes, including:      Home Exercises Provided and Patient Education Provided     Education provided:   - Importance of protecting the repaired shoulder and following precautions (ex. Lifting, using shoulder, improper use of sling)  - Adhesive Capsulitis     Written Home Exercises Provided: Patient instructed to cont prior HEP.  Exercises were reviewed and Medardo was able to demonstrate them prior to the end of the session.  Medardo demonstrated good  understanding of the education provided.     See EMR under Patient Instructions for exercises provided 10/21/2022.    Assessment     Medardo  presents to physical therapy with deficits in active shoulder flexion range of motion. Motion was increased with verbal and tactile cues to " activate upper trap with movement. Pt lacks upward rotation due to weakness in his upper trap and serratus anterior. He responded well to inferior glide of AC joint with increased in range of motion. Plan to perform updated measures next session as pt will have follow up with surgeon. Plan to contiue to progress patient as tolerated with emphasis on improving scapular thoracic rhythm.       Medardo Is progressing well towards his goals.   Pt prognosis is Good.     Pt will continue to benefit from skilled outpatient physical therapy to address the deficits listed in the problem list box on initial evaluation, provide pt/family education and to maximize pt's level of independence in the home and community environment.     Pt's spiritual, cultural and educational needs considered and pt agreeable to plan of care and goals.     Anticipated barriers to physical therapy: none    Goals:   Short Term Goals (12 Weeks):   1. Pt will be independent with HEP to supplement PT in improving functional use of R UE. (MET)   2. Pt will increase pain free L shoulder elevation PROM to >/= 160 deg to improve functional mobility of UE (Progressing)  3. Pt will increase L shoulder ER PROM in 90 deg abduction to >/=20 deg to improve functional mobility of UE (Progressing)  Long Term Goals (24 Weeks):   1. Pt will improve FOTO score to </=TBD% limited to decrease perceived limitation with carrying, moving, and handling objects.  2. Pt will increase L shoulder PROM to WNL in all planes to improve functional use of R RUE.  3. Pt will increase L shoulder AROM to WFL in all planes to improve functional use of R RUE.  4. Pt will improve L shoulder MMTs to = 5/5 to promote equal use of B UEs in performing functional tasks.  5. Pt will lift 10 lb objects without pain to promote functional QOL.  6. Pt to report pain </= 0/10 with ADLs and IADLs using L UE to improve functional QOL.     Plan     Continue to progress GH and ST joint mobility,  gradually increase shoulder ROM, scapular muscle control     Julius Varner, ABBE, DPT

## 2022-12-21 NOTE — PROGRESS NOTES
Physical Therapy Daily Treatment Note     Name: Medardo Daconomichael [facility]  Clinic Number: [id number]    Therapy Diagnosis:   No diagnosis found.        Physician: Erik Alarcon PA-C    Visit Date: 12/22/2022    Physician Orders: PT Eval and Treat   Medical Diagnosis from Referral: M75.122 (ICD-10-CM) - Nontraumatic complete tear of left rotator cuff  Evaluation Date: 10/6/2022  Authorization Period Expiration: 12/31/2022  Plan of Care Expiration: 4/6/2022  Progress Note Due: 1/12/2023  Visit # / Visits authorized: 15/ 29  FOTO: 4/5    1st FOTO Follow up:   2nd FOTO Follow up:     Time In: ***  Time Out: ***  Total Billable Time: *** minutes    Precautions: Standard and and s/p L medium RTC repair on 10/3/2022    Procedure(s) (LRB):  REPAIR, ROTATOR CUFF, ARTHROSCOPIC (Left)  FIXATION, TENDON (Left)  ARTHROSCOPY, SHOULDER, WITH SUBACROMIAL SPACE DECOMPRESSION (Left)  DEBRIDEMENT, SHOULDER, ARTHROSCOPIC-LABRUM (Left)    Subjective     Pt reports: his shoulder feel about the same and he feel like he is slowly progressing    He was compliant with home exercise program.  Response to previous treatment: decreased pain  Functional change: ongoing    Pain: 0/10  Location: left shoulder      Objective        Shoulder Left Left Pain/Dysfunction with Movement     AROM PROM     Flexion 160 170     Abduction 85 90     External Rotation @90 Abduction 80 80     Internal Rotation @90  Abduction  20 20        Medardo received therapeutic exercises to develop strength, endurance, and ROM for 0 minutes including:    Assessment as above  LLLD inferior capsular stretch with arm resting on bolster + 4#, 6 minutes  Seated thoracic spine extension over a chair, 2 x 10    Not today  Side Lying Shoulder Flexion (no dowel today), 3 x 8  Prone scapular retractions with hands resting on the back of the head, 2 x 8    Medardo received the following manual therapy techniques: Joint mobilizations were applied to the: left shoulder for 21  "minutes, including:    GH posterior glides Grade III  GH inferior Glides Grade III  GH inferior mobs with shoulder at 90, grade III  AC and SC joint mobs in all directions, grade III  AC joint inferior glide with shoulder flexion, grade III  Posterior Shoulder Capsule MET 5x5    Medardo participated in neuromuscular re-education activities to improve: Coordination, Kinesthetic, and Proprioception for 38 minutes. The following activities were included:      Upper Trap Level 3 2x12  Serratus wall slides, 3 x 8 with foam roller   Standing Hand Heel rocks 2x12   Serratus push up 12x  Prone Mid Trap Level 2, 2 x 8  Prone Lower trap Level 2, 2 x 8  IR walkouts 2 x 10 OTB (arm at 90 deg flexion)  ER walkouts 2 x 10 OTB (arm at 90 deg flexion)  Rhythmic stabilizations with PT resistance, 3 x 30" with shoulder at 90  Side lying shoulder flexion with tactile cues 3x5     Not today:  Hitchhikers + controlled arm lift, 2 x 10 with 3" holds  Supine active shoulder flexion with PT rhythmic stabilization, 2 x 4        Medardo participated in dynamic functional therapeutic activities to improve functional performance for 0 minutes, including:      Home Exercises Provided and Patient Education Provided     Education provided:   - Importance of protecting the repaired shoulder and following precautions (ex. Lifting, using shoulder, improper use of sling)  - Adhesive Capsulitis     Written Home Exercises Provided: Patient instructed to cont prior HEP.  Exercises were reviewed and Medardo was able to demonstrate them prior to the end of the session.  Medardo demonstrated good  understanding of the education provided.     See EMR under Patient Instructions for exercises provided 10/21/2022.    Assessment     Medardo  presents to physical therapy with deficits in active shoulder flexion range of motion. Motion was increased with verbal and tactile cues to activate upper trap with movement. Pt lacks upward rotation due to " weakness in his upper trap and serratus anterior. He responded well to inferior glide of AC joint with increased in range of motion. Plan to perform updated measures next session as pt will have follow up with surgeon. Plan to contiue to progress patient as tolerated with emphasis on improving scapular thoracic rhythm.       Medardo Is progressing well towards his goals.   Pt prognosis is Good.     Pt will continue to benefit from skilled outpatient physical therapy to address the deficits listed in the problem list box on initial evaluation, provide pt/family education and to maximize pt's level of independence in the home and community environment.     Pt's spiritual, cultural and educational needs considered and pt agreeable to plan of care and goals.     Anticipated barriers to physical therapy: none    Goals:   Short Term Goals (12 Weeks):   1. Pt will be independent with HEP to supplement PT in improving functional use of R UE. (MET)   2. Pt will increase pain free L shoulder elevation PROM to >/= 160 deg to improve functional mobility of UE (Progressing)  3. Pt will increase L shoulder ER PROM in 90 deg abduction to >/=20 deg to improve functional mobility of UE (Progressing)  Long Term Goals (24 Weeks):   1. Pt will improve FOTO score to </=TBD% limited to decrease perceived limitation with carrying, moving, and handling objects.  2. Pt will increase L shoulder PROM to WNL in all planes to improve functional use of R RUE.  3. Pt will increase L shoulder AROM to WFL in all planes to improve functional use of R RUE.  4. Pt will improve L shoulder MMTs to = 5/5 to promote equal use of B UEs in performing functional tasks.  5. Pt will lift 10 lb objects without pain to promote functional QOL.  6. Pt to report pain </= 0/10 with ADLs and IADLs using L UE to improve functional QOL.     Plan     Continue to progress GH and ST joint mobility, gradually increase shoulder ROM, scapular muscle control     IZAIAH  LETY, PT, DPT

## 2022-12-22 ENCOUNTER — CLINICAL SUPPORT (OUTPATIENT)
Dept: REHABILITATION | Facility: HOSPITAL | Age: 52
End: 2022-12-22
Payer: COMMERCIAL

## 2022-12-22 ENCOUNTER — OFFICE VISIT (OUTPATIENT)
Dept: SPORTS MEDICINE | Facility: CLINIC | Age: 52
End: 2022-12-22
Payer: COMMERCIAL

## 2022-12-22 VITALS
WEIGHT: 165 LBS | BODY MASS INDEX: 25.01 KG/M2 | HEART RATE: 61 BPM | HEIGHT: 68 IN | DIASTOLIC BLOOD PRESSURE: 80 MMHG | SYSTOLIC BLOOD PRESSURE: 120 MMHG

## 2022-12-22 DIAGNOSIS — Z98.890 S/P ROTATOR CUFF REPAIR: Primary | ICD-10-CM

## 2022-12-22 DIAGNOSIS — M25.612 DECREASED RANGE OF MOTION OF SHOULDER, LEFT: Primary | ICD-10-CM

## 2022-12-22 PROCEDURE — 1159F MED LIST DOCD IN RCRD: CPT | Mod: CPTII,S$GLB,, | Performed by: ORTHOPAEDIC SURGERY

## 2022-12-22 PROCEDURE — 3074F PR MOST RECENT SYSTOLIC BLOOD PRESSURE < 130 MM HG: ICD-10-PCS | Mod: CPTII,S$GLB,, | Performed by: ORTHOPAEDIC SURGERY

## 2022-12-22 PROCEDURE — 99999 PR PBB SHADOW E&M-EST. PATIENT-LVL III: CPT | Mod: PBBFAC,,, | Performed by: ORTHOPAEDIC SURGERY

## 2022-12-22 PROCEDURE — 3079F DIAST BP 80-89 MM HG: CPT | Mod: CPTII,S$GLB,, | Performed by: ORTHOPAEDIC SURGERY

## 2022-12-22 PROCEDURE — 3079F PR MOST RECENT DIASTOLIC BLOOD PRESSURE 80-89 MM HG: ICD-10-PCS | Mod: CPTII,S$GLB,, | Performed by: ORTHOPAEDIC SURGERY

## 2022-12-22 PROCEDURE — 3008F PR BODY MASS INDEX (BMI) DOCUMENTED: ICD-10-PCS | Mod: CPTII,S$GLB,, | Performed by: ORTHOPAEDIC SURGERY

## 2022-12-22 PROCEDURE — 97112 NEUROMUSCULAR REEDUCATION: CPT | Mod: PO

## 2022-12-22 PROCEDURE — 3074F SYST BP LT 130 MM HG: CPT | Mod: CPTII,S$GLB,, | Performed by: ORTHOPAEDIC SURGERY

## 2022-12-22 PROCEDURE — 97140 MANUAL THERAPY 1/> REGIONS: CPT | Mod: PO

## 2022-12-22 PROCEDURE — 97110 THERAPEUTIC EXERCISES: CPT | Mod: PO

## 2022-12-22 PROCEDURE — 1159F PR MEDICATION LIST DOCUMENTED IN MEDICAL RECORD: ICD-10-PCS | Mod: CPTII,S$GLB,, | Performed by: ORTHOPAEDIC SURGERY

## 2022-12-22 PROCEDURE — 99024 POSTOP FOLLOW-UP VISIT: CPT | Mod: S$GLB,,, | Performed by: ORTHOPAEDIC SURGERY

## 2022-12-22 PROCEDURE — 99999 PR PBB SHADOW E&M-EST. PATIENT-LVL III: ICD-10-PCS | Mod: PBBFAC,,, | Performed by: ORTHOPAEDIC SURGERY

## 2022-12-22 PROCEDURE — 3008F BODY MASS INDEX DOCD: CPT | Mod: CPTII,S$GLB,, | Performed by: ORTHOPAEDIC SURGERY

## 2022-12-22 PROCEDURE — 99024 PR POST-OP FOLLOW-UP VISIT: ICD-10-PCS | Mod: S$GLB,,, | Performed by: ORTHOPAEDIC SURGERY

## 2022-12-22 NOTE — PROGRESS NOTES
"CC: Left shoulder post op 12 weeks    Patient is here for his 12 week post op appointment s/p below and is doing well. Patient is doing PT at [facility] location and feels that he is progressing slowly. He reports "popcorn" feeling and states shoulder is crunchy.     DATE OF SURGERY:  10/3/2022      PREOPERATIVE DIAGNOSES:   1. Left shoulder rotator cuff tear.   2. Left shoulder AC joint arthritis  3. Left shoulder labral tear / biceps tendinopathy     POSTOPERATIVE DIAGNOSES:   1. Left shoulder rotator cuff tear.   2. Left shoulder AC joint arthritis  3. Left shoulder labral tear / biceps tendinopathy     PROCEDURE:   1. Left shoulder arthroscopic rotator cuff repair.   2. Left shoulder arthroscopic distal clavicle excision  3. Left shoulder arthroscopic subacromial decompression.   4. Left shoulder open subpectoral biceps tenodesis  5. Left shoulder arthroscopic debridement labrum      SURGEON: Jef Lawrence M.D.     Pain well tolerated on pain medication  Sling in place  No issues reported    Review of Systems   Constitution: Negative. Negative for chills, fever and night sweats.    Cardiovascular: Negative for chest pain and syncope.   Respiratory: Negative for cough and shortness of breath.    Gastrointestinal: Negative for abdominal pain and bowel incontinence.      PE:    /80   Pulse 61   Ht 5' 8" (1.727 m)   Wt 74.8 kg (165 lb)   BMI 25.09 kg/m²      Left shoulder    Incisions healed  No sign of infection  Swelling resolved  Compartments soft  Neurovascular status intact in extremity    AROM  Forward elevation 170 degrees  External rotation 40 degrees    Assessment:  6 weeks s/p left shoulder arthroscopic rotator cuff repair, distal clavicle excision, subacromial decompression, open subpectoral biceps tenodesis, labral debridement    Plan:  1. Continue PT   2.RTC in 3 months    All questions were answered. Instructed patient to call with questions or concerns in the interim.       Medical " Dictation software was used during the dictation of portions or the entirety of this medical record.  Phonetic or grammatic errors may exist due to the use of this software. For clarification, refer to the author of the document.

## 2022-12-22 NOTE — PROGRESS NOTES
Physical Therapy Daily Treatment Note     Name: Medardo [facility]  Clinic Number: [id number]    Therapy Diagnosis:   Encounter Diagnosis   Name Primary?    Decreased range of motion of shoulder, left Yes           Physician: Erik Alarcon PA-C    Visit Date: 12/22/2022    Physician Orders: PT Eval and Treat   Medical Diagnosis from Referral: M75.122 (ICD-10-CM) - Nontraumatic complete tear of left rotator cuff  Evaluation Date: 10/6/2022  Authorization Period Expiration: 12/31/2022  Plan of Care Expiration: 4/6/2022  Progress Note Due: 1/12/2023  Visit # / Visits authorized: 15/ 29  FOTO: 4/5    1st FOTO Follow up:   2nd FOTO Follow up:     Time In: 11:05  Time Out: 12:06  Total Billable Time: 61 minutes    Precautions: Standard and and s/p L medium RTC repair on 10/3/2022    Procedure(s) (LRB):  REPAIR, ROTATOR CUFF, ARTHROSCOPIC (Left)  FIXATION, TENDON (Left)  ARTHROSCOPY, SHOULDER, WITH SUBACROMIAL SPACE DECOMPRESSION (Left)  DEBRIDEMENT, SHOULDER, ARTHROSCOPIC-LABRUM (Left)    Subjective     Pt reports: his shoulder feel about the same and he feel like he is slowly progressing    He was compliant with home exercise program.  Response to previous treatment: decreased pain  Functional change: ongoing    Pain: 0/10  Location: left shoulder      Objective        Shoulder Left Left     AROM PROM   Flexion 160 170   Abduction 85 90   External Rotation @90 Abduction 85 85   Internal Rotation @90  Abduction  30 30       Medardo received therapeutic exercises to develop strength, endurance, and ROM for 13 minutes including:    Assessment as above  LLLD inferior capsular stretch with arm resting on bolster + 4#, 6 minutes  Seated thoracic spine extension over a chair, 2 x 10    Not today  Side Lying Shoulder Flexion (no dowel today), 3 x 8  Prone scapular retractions with hands resting on the back of the head, 2 x 8    Medardo received the following manual therapy techniques: Joint mobilizations were applied  "to the: left shoulder for 22 minutes, including:    GH posterior glides Grade III  GH inferior Glides Grade III  GH inferior mobs with shoulder at 90, grade III  AC and SC joint mobs in all directions, grade III  AC joint inferior glide with shoulder flexion, grade III  Posterior Shoulder Capsule MET 5x5    Medardo participated in neuromuscular re-education activities to improve: Coordination, Kinesthetic, and Proprioception for 26 minutes. The following activities were included:      Upper Trap Level 3 2x12  Serratus wall slides, 3 x 8 with foam roller YTB   Standing Hand Heel rocks 2x12   Serratus push up 12x  Prone Mid Trap Level 2, 3 x 8  Prone Lower trap Level 2, 3 x 8  IR walkouts 2 x 10 OTB (arm at 90 deg flexion)  ER walkouts 2 x 10 OTB (arm at 90 deg flexion)  Rhythmic stabilizations with PT resistance, 3 x 30" with shoulder at 90  Side lying shoulder flexion with tactile cues 3x5     Not today:  Hitchhikers + controlled arm lift, 2 x 10 with 3" holds  Supine active shoulder flexion with PT rhythmic stabilization, 2 x 4        Medardo participated in dynamic functional therapeutic activities to improve functional performance for 0 minutes, including:      Home Exercises Provided and Patient Education Provided     Education provided:   - Importance of protecting the repaired shoulder and following precautions (ex. Lifting, using shoulder, improper use of sling)  - Adhesive Capsulitis     Written Home Exercises Provided: Patient instructed to cont prior HEP.  Exercises were reviewed and Medardo was able to demonstrate them prior to the end of the session.  Medardo demonstrated good  understanding of the education provided.     See EMR under Patient Instructions for exercises provided 10/21/2022.    Assessment     Medardo presents to physical therapy with good, but slightly limited passive shoulder flexion range of motion and with limitations in active range of motion. Pt was able to increase " shoulder range of motion during treatment session with greatest increase of motion coming after manual therapy. Pt lacks upper extremity strength as he is able to achieve greater passive range of motion compared to active. Pt needs to continue to strengthen scapular stabilizer strength to bridge to gap between active and passive range of motion. Plan to contiue to progress patient as tolerated with emphasis on improving scapular thoracic rhythm.       Medardo Is progressing well towards his goals.   Pt prognosis is Good.     Pt will continue to benefit from skilled outpatient physical therapy to address the deficits listed in the problem list box on initial evaluation, provide pt/family education and to maximize pt's level of independence in the home and community environment.     Pt's spiritual, cultural and educational needs considered and pt agreeable to plan of care and goals.     Anticipated barriers to physical therapy: none    Goals:   Short Term Goals (12 Weeks):   1. Pt will be independent with HEP to supplement PT in improving functional use of R UE. (MET)   2. Pt will increase pain free L shoulder elevation PROM to >/= 160 deg to improve functional mobility of UE (Progressing)  3. Pt will increase L shoulder ER PROM in 90 deg abduction to >/=20 deg to improve functional mobility of UE (Progressing)  Long Term Goals (24 Weeks):   1. Pt will improve FOTO score to </=TBD% limited to decrease perceived limitation with carrying, moving, and handling objects.  2. Pt will increase L shoulder PROM to WNL in all planes to improve functional use of R RUE.  3. Pt will increase L shoulder AROM to WFL in all planes to improve functional use of R RUE.  4. Pt will improve L shoulder MMTs to = 5/5 to promote equal use of B UEs in performing functional tasks.  5. Pt will lift 10 lb objects without pain to promote functional QOL.  6. Pt to report pain </= 0/10 with ADLs and IADLs using L UE to improve functional QOL.      Plan     Continue to progress GH and ST joint mobility, gradually increase shoulder ROM, scapular muscle control     Julius Varner, PT, DPT

## 2022-12-27 ENCOUNTER — CLINICAL SUPPORT (OUTPATIENT)
Dept: REHABILITATION | Facility: HOSPITAL | Age: 52
End: 2022-12-27
Payer: COMMERCIAL

## 2022-12-27 DIAGNOSIS — M25.612 DECREASED RANGE OF MOTION OF SHOULDER, LEFT: Primary | ICD-10-CM

## 2022-12-27 PROCEDURE — 97110 THERAPEUTIC EXERCISES: CPT | Mod: PO

## 2022-12-27 PROCEDURE — 97112 NEUROMUSCULAR REEDUCATION: CPT | Mod: PO

## 2022-12-27 NOTE — PROGRESS NOTES
Physical Therapy Daily Treatment Note     Name: Medardo Vizcaino [facility]  Clinic Number: [id number]    Therapy Diagnosis:   Encounter Diagnosis   Name Primary?    Decreased range of motion of shoulder, left Yes       Physician: Erik Alarcon PA-C    Visit Date: 12/27/2022    Physician Orders: PT Eval and Treat   Medical Diagnosis from Referral: M75.122 (ICD-10-CM) - Nontraumatic complete tear of left rotator cuff  Evaluation Date: 10/6/2022  Authorization Period Expiration: 12/31/2022  Plan of Care Expiration: 4/6/2022  Progress Note Due: 1/12/2023  Visit # / Visits authorized: 16/ 29  FOTO: 4/5    1st FOTO Follow up:   2nd FOTO Follow up:     Time In: 1058am  Time Out: 1150am  Total Billable Time: 50 minutes    Precautions: Standard and and s/p L medium RTC repair on 10/3/2022    Procedure(s) (LRB):  REPAIR, ROTATOR CUFF, ARTHROSCOPIC (Left)  FIXATION, TENDON (Left)  ARTHROSCOPY, SHOULDER, WITH SUBACROMIAL SPACE DECOMPRESSION (Left)  DEBRIDEMENT, SHOULDER, ARTHROSCOPIC-LABRUM (Left)    Subjective     Pt reports: that he saw his surgeon last week for his 12 week follow up appointment. He stated that his surgeon was happy with his results so far. He was told that he can be more aggressive with his physical therapy. He was cleared to right his bike and scooter and has had no issues with either.     He was compliant with home exercise program.  Response to previous treatment: decreased pain  Functional change: ongoing    Pain: 0/10  Location: left shoulder      Objective        Shoulder Left Left     AROM PROM   Flexion 160 170   Abduction 85 90   External Rotation @90 Abduction 85 85   Internal Rotation @90  Abduction  30 30       Medardo received therapeutic exercises to develop strength, endurance, and ROM for 10 minutes including:    LLLD inferior capsular stretch with arm resting on bolster + 7#, 6 minutes  Seated thoracic spine extension over a chair, 3 x 10   carries with 2# dumbbell, 3 laps there and  "back on turf    Medardo received the following manual therapy techniques: Joint mobilizations were applied to the: left shoulder for 00 minutes, including:    Not performed today due to good joint mobility in all directions:  GH posterior glides Grade III  GH inferior Glides Grade III  GH inferior mobs with shoulder at 90, grade III  AC and SC joint mobs in all directions, grade III  AC joint inferior glide with shoulder flexion, grade III  Posterior Shoulder Capsule MET 5x5    Medardo participated in neuromuscular re-education activities to improve: Coordination, Kinesthetic, and Proprioception for 40 minutes. The following activities were included:    Rhythmic stabilizations with PT resistance, 2 x 30" each with shoulder at 90 and 120  ER and IR walkouts with shoulder flexed to 90 + overhead press with OTB, 15x each direction  Hand heel rocks, 2 x 10  Prone Mid Trap Level 3, 2 x 8  Prone Lower trap Level 3, 2 x 8  Upper Trap Level 3, 2 x 8   Serratus wall slides, 3 x 8 with RTB  Standing shoulder flexion with YTB around wrists, 2 x 10  Ball circles on wall with 2KB ball, clockwise and counter clockwise, 2 x 20" each    Medardo participated in dynamic functional therapeutic activities to improve functional performance for 0 minutes, including:      Home Exercises Provided and Patient Education Provided     Education provided:   - Importance of protecting the repaired shoulder and following precautions (ex. Lifting, using shoulder, improper use of sling)  - Adhesive Capsulitis     Written Home Exercises Provided: Patient instructed to cont prior HEP.  Exercises were reviewed and Medardo was able to demonstrate them prior to the end of the session.  Medardo demonstrated good  understanding of the education provided.     See EMR under Patient Instructions for exercises provided 10/21/2022.    Assessment     Medardo presents with acceptable GH joint mobility in all directions today so manual interventions " were applied today. Low load long duration stretching was performed at the onset to ensure that his inferior capsular mobility remains. He was progress with rotator cuff and scapular loading today to further progress his motor control and strength. Increased time was spent on rhythmic stability of his cuff musculature and cuff endurance activities were added as well.     Medardo Is progressing well towards his goals.   Pt prognosis is Good.     Pt will continue to benefit from skilled outpatient physical therapy to address the deficits listed in the problem list box on initial evaluation, provide pt/family education and to maximize pt's level of independence in the home and community environment.     Pt's spiritual, cultural and educational needs considered and pt agreeable to plan of care and goals.     Anticipated barriers to physical therapy: none    Goals:   Short Term Goals (12 Weeks):   1. Pt will be independent with HEP to supplement PT in improving functional use of R UE. (MET)   2. Pt will increase pain free L shoulder elevation PROM to >/= 160 deg to improve functional mobility of UE (MET)  3. Pt will increase L shoulder ER PROM in 90 deg abduction to >/=20 deg to improve functional mobility of UE (MET)  Long Term Goals (24 Weeks):   1. Pt will improve FOTO score to </=TBD% limited to decrease perceived limitation with carrying, moving, and handling objects.  2. Pt will increase L shoulder PROM to WNL in all planes to improve functional use of R RUE.  3. Pt will increase L shoulder AROM to WFL in all planes to improve functional use of R RUE.  4. Pt will improve L shoulder MMTs to = 5/5 to promote equal use of B UEs in performing functional tasks.  5. Pt will lift 10 lb objects without pain to promote functional QOL.  6. Pt to report pain </= 0/10 with ADLs and IADLs using L UE to improve functional QOL.     Plan     Continue to progress GH and ST joint mobility, gradually increase shoulder ROM, scapular  muscle control     IZAIAH DAVIDSON, PT, DPT

## 2022-12-29 ENCOUNTER — CLINICAL SUPPORT (OUTPATIENT)
Dept: REHABILITATION | Facility: HOSPITAL | Age: 52
End: 2022-12-29
Payer: COMMERCIAL

## 2022-12-29 DIAGNOSIS — M25.612 DECREASED RANGE OF MOTION OF SHOULDER, LEFT: Primary | ICD-10-CM

## 2022-12-29 PROCEDURE — 97140 MANUAL THERAPY 1/> REGIONS: CPT | Mod: PO

## 2022-12-29 PROCEDURE — 97112 NEUROMUSCULAR REEDUCATION: CPT | Mod: PO

## 2022-12-29 PROCEDURE — 97110 THERAPEUTIC EXERCISES: CPT | Mod: PO

## 2022-12-29 NOTE — PROGRESS NOTES
Physical Therapy Daily Treatment Note     Name: Medardo Vizcaino [facility]  Clinic Number: [id number]    Therapy Diagnosis:   Encounter Diagnosis   Name Primary?    Decreased range of motion of shoulder, left Yes         Physician: Erik Alarcon PA-C    Visit Date: 12/29/2022    Physician Orders: PT Eval and Treat   Medical Diagnosis from Referral: M75.122 (ICD-10-CM) - Nontraumatic complete tear of left rotator cuff  Evaluation Date: 10/6/2022  Authorization Period Expiration: 12/31/2022  Plan of Care Expiration: 4/6/2022  Progress Note Due: 1/12/2023  Visit # / Visits authorized: 17/ 29  FOTO: 4/5    1st FOTO Follow up:   2nd FOTO Follow up:     Time In: 11:03  Time Out: 12:00  Total Billable Time: 57 minutes    Precautions: Standard and and s/p L medium RTC repair on 10/3/2022    Procedure(s) (LRB):  REPAIR, ROTATOR CUFF, ARTHROSCOPIC (Left)  FIXATION, TENDON (Left)  ARTHROSCOPY, SHOULDER, WITH SUBACROMIAL SPACE DECOMPRESSION (Left)  DEBRIDEMENT, SHOULDER, ARTHROSCOPIC-LABRUM (Left)    Subjective     Pt reports: that he still continues to feel that his left shoulder is weaker then his right especially with lifting his arm over head. Pt wants to continue to be progressed through therapy.     He was compliant with home exercise program.  Response to previous treatment: decreased pain  Functional change: ongoing    Pain: 0/10  Location: left shoulder      Objective         Medardo received therapeutic exercises to develop strength, endurance, and ROM for 16 minutes including:    Side Lying ER 2# 2x15  Shoulder ER/IR RTB 2x12  Shoulder flexion eccentric with cable 2x10 7#  Standing Horizontal Abduction 2x12 YTB    Not Today  LLLD inferior capsular stretch with arm resting on bolster + 7#, 6 minutes  Seated thoracic spine extension over a chair, 3 x 10   carries with 2# dumbbell, 3 laps there and back on turf    Medardo received the following manual therapy techniques: Joint mobilizations were applied to the:  "left shoulder for 13 minutes, including:      GH posterior glides Grade III  GH inferior Glides Grade III  AC joint inferior glide with shoulder flexion, grade III  Posterior Shoulder Capsule MET 5x5    Medardo participated in neuromuscular re-education activities to improve: Coordination, Kinesthetic, and Proprioception for 28 minutes. The following activities were included:    Rhythmic stabilizations with PT resistance, 2 x 30" each with shoulder at 90 and 120  ER and IR walkouts with shoulder flexed to 90 + overhead press with OTB, 15x each direction  Hand heel rocks, 2 x 10  Prone Mid Trap Level 3, 2 x 8  Prone Lower trap Level 3, 2 x 8  Standing shoulder flexion with YTB around wrists, 2 x 10  Ball circles on wall with 2KB ball, clockwise and counter clockwise, 2 x 20" each      Medardo participated in dynamic functional therapeutic activities to improve functional performance for 0 minutes, including:      Home Exercises Provided and Patient Education Provided     Education provided:   - Importance of protecting the repaired shoulder and following precautions (ex. Lifting, using shoulder, improper use of sling)  - Adhesive Capsulitis     Written Home Exercises Provided: Patient instructed to cont prior HEP.  Exercises were reviewed and Medardo was able to demonstrate them prior to the end of the session.  Medardo demonstrated good  understanding of the education provided.     See EMR under Patient Instructions for exercises provided 10/21/2022.    Assessment     Medardo continues to progress well through therapy. Manual interventions were performed to unsure good joint mobility with ease of motion. Pt continues to be progress and challenged with exercises without pain. He does report clicking in shoulder after fatiguing his rotator cuff muscles especially with rhythmic stabilization. Pt needs to continue to increase his shoulder strength and endurance to utilize available range of motion. Pt still " requires verbal and tactile cues to perform scapular stability exercises correctly without compensation.       Medardo Is progressing well towards his goals.   Pt prognosis is Good.     Pt will continue to benefit from skilled outpatient physical therapy to address the deficits listed in the problem list box on initial evaluation, provide pt/family education and to maximize pt's level of independence in the home and community environment.     Pt's spiritual, cultural and educational needs considered and pt agreeable to plan of care and goals.     Anticipated barriers to physical therapy: none    Goals:   Short Term Goals (12 Weeks):   1. Pt will be independent with HEP to supplement PT in improving functional use of R UE. (MET)   2. Pt will increase pain free L shoulder elevation PROM to >/= 160 deg to improve functional mobility of UE (MET)  3. Pt will increase L shoulder ER PROM in 90 deg abduction to >/=20 deg to improve functional mobility of UE (MET)  Long Term Goals (24 Weeks):   1. Pt will improve FOTO score to </=TBD% limited to decrease perceived limitation with carrying, moving, and handling objects.  2. Pt will increase L shoulder PROM to WNL in all planes to improve functional use of R RUE.  3. Pt will increase L shoulder AROM to WFL in all planes to improve functional use of R RUE.  4. Pt will improve L shoulder MMTs to = 5/5 to promote equal use of B UEs in performing functional tasks.  5. Pt will lift 10 lb objects without pain to promote functional QOL.  6. Pt to report pain </= 0/10 with ADLs and IADLs using L UE to improve functional QOL.     Plan     Continue to progress GH and ST joint mobility, gradually increase shoulder ROM, scapular muscle control     Julius Varner, PT, DPT

## 2023-01-03 ENCOUNTER — LAB VISIT (OUTPATIENT)
Dept: LAB | Facility: HOSPITAL | Age: 53
End: 2023-01-03
Attending: INTERNAL MEDICINE
Payer: COMMERCIAL

## 2023-01-03 ENCOUNTER — CLINICAL SUPPORT (OUTPATIENT)
Dept: REHABILITATION | Facility: HOSPITAL | Age: 53
End: 2023-01-03
Payer: COMMERCIAL

## 2023-01-03 ENCOUNTER — OFFICE VISIT (OUTPATIENT)
Dept: INTERNAL MEDICINE | Facility: CLINIC | Age: 53
End: 2023-01-03
Payer: COMMERCIAL

## 2023-01-03 VITALS
SYSTOLIC BLOOD PRESSURE: 110 MMHG | DIASTOLIC BLOOD PRESSURE: 74 MMHG | HEART RATE: 54 BPM | WEIGHT: 168.88 LBS | OXYGEN SATURATION: 98 % | HEIGHT: 68 IN | BODY MASS INDEX: 25.59 KG/M2

## 2023-01-03 DIAGNOSIS — H57.13 PAIN OF BOTH EYES: ICD-10-CM

## 2023-01-03 DIAGNOSIS — R42 DIZZINESS: ICD-10-CM

## 2023-01-03 DIAGNOSIS — E03.4 HYPOTHYROIDISM DUE TO ACQUIRED ATROPHY OF THYROID: ICD-10-CM

## 2023-01-03 DIAGNOSIS — M25.612 DECREASED RANGE OF MOTION OF SHOULDER, LEFT: Primary | ICD-10-CM

## 2023-01-03 DIAGNOSIS — Z12.11 COLON CANCER SCREENING: ICD-10-CM

## 2023-01-03 DIAGNOSIS — M25.512 CHRONIC PAIN OF BOTH SHOULDERS: ICD-10-CM

## 2023-01-03 DIAGNOSIS — G89.29 CHRONIC PAIN OF BOTH SHOULDERS: ICD-10-CM

## 2023-01-03 DIAGNOSIS — R94.31 ABNORMAL EKG: ICD-10-CM

## 2023-01-03 DIAGNOSIS — Z98.890 S/P SHOULDER SURGERY: ICD-10-CM

## 2023-01-03 DIAGNOSIS — Z00.00 ROUTINE PHYSICAL EXAMINATION: ICD-10-CM

## 2023-01-03 DIAGNOSIS — Z00.00 ROUTINE PHYSICAL EXAMINATION: Primary | ICD-10-CM

## 2023-01-03 DIAGNOSIS — M25.511 CHRONIC PAIN OF BOTH SHOULDERS: ICD-10-CM

## 2023-01-03 DIAGNOSIS — Z12.5 SCREENING FOR PROSTATE CANCER: ICD-10-CM

## 2023-01-03 DIAGNOSIS — M47.22 OSTEOARTHRITIS OF SPINE WITH RADICULOPATHY, CERVICAL REGION: ICD-10-CM

## 2023-01-03 DIAGNOSIS — R25.1 TREMOR OF LEFT HAND: ICD-10-CM

## 2023-01-03 DIAGNOSIS — M75.122 NONTRAUMATIC COMPLETE TEAR OF LEFT ROTATOR CUFF: ICD-10-CM

## 2023-01-03 LAB
ALBUMIN SERPL BCP-MCNC: 4 G/DL (ref 3.5–5.2)
ALP SERPL-CCNC: 65 U/L (ref 55–135)
ALT SERPL W/O P-5'-P-CCNC: 15 U/L (ref 10–44)
ANION GAP SERPL CALC-SCNC: 11 MMOL/L (ref 8–16)
AST SERPL-CCNC: 21 U/L (ref 10–40)
BASOPHILS # BLD AUTO: 0.03 K/UL (ref 0–0.2)
BASOPHILS NFR BLD: 0.5 % (ref 0–1.9)
BILIRUB SERPL-MCNC: 1.1 MG/DL (ref 0.1–1)
BUN SERPL-MCNC: 12 MG/DL (ref 6–20)
CALCIUM SERPL-MCNC: 9.7 MG/DL (ref 8.7–10.5)
CHLORIDE SERPL-SCNC: 107 MMOL/L (ref 95–110)
CHOLEST SERPL-MCNC: 199 MG/DL (ref 120–199)
CHOLEST/HDLC SERPL: 3.4 {RATIO} (ref 2–5)
CO2 SERPL-SCNC: 21 MMOL/L (ref 23–29)
CREAT SERPL-MCNC: 0.9 MG/DL (ref 0.5–1.4)
DIFFERENTIAL METHOD: ABNORMAL
EOSINOPHIL # BLD AUTO: 0.1 K/UL (ref 0–0.5)
EOSINOPHIL NFR BLD: 1.8 % (ref 0–8)
ERYTHROCYTE [DISTWIDTH] IN BLOOD BY AUTOMATED COUNT: 12.9 % (ref 11.5–14.5)
EST. GFR  (NO RACE VARIABLE): >60 ML/MIN/1.73 M^2
ESTIMATED AVG GLUCOSE: 91 MG/DL (ref 68–131)
GLUCOSE SERPL-MCNC: 83 MG/DL (ref 70–110)
HBA1C MFR BLD: 4.8 % (ref 4–5.6)
HCT VFR BLD AUTO: 43.4 % (ref 40–54)
HDLC SERPL-MCNC: 58 MG/DL (ref 40–75)
HDLC SERPL: 29.1 % (ref 20–50)
HGB BLD-MCNC: 14.6 G/DL (ref 14–18)
IMM GRANULOCYTES # BLD AUTO: 0.01 K/UL (ref 0–0.04)
IMM GRANULOCYTES NFR BLD AUTO: 0.2 % (ref 0–0.5)
LDLC SERPL CALC-MCNC: 125.8 MG/DL (ref 63–159)
LYMPHOCYTES # BLD AUTO: 1.7 K/UL (ref 1–4.8)
LYMPHOCYTES NFR BLD: 29.1 % (ref 18–48)
MCH RBC QN AUTO: 31.4 PG (ref 27–31)
MCHC RBC AUTO-ENTMCNC: 33.6 G/DL (ref 32–36)
MCV RBC AUTO: 93 FL (ref 82–98)
MONOCYTES # BLD AUTO: 0.4 K/UL (ref 0.3–1)
MONOCYTES NFR BLD: 6.4 % (ref 4–15)
NEUTROPHILS # BLD AUTO: 3.7 K/UL (ref 1.8–7.7)
NEUTROPHILS NFR BLD: 62 % (ref 38–73)
NONHDLC SERPL-MCNC: 141 MG/DL
NRBC BLD-RTO: 0 /100 WBC
PLATELET # BLD AUTO: 216 K/UL (ref 150–450)
PMV BLD AUTO: 10.3 FL (ref 9.2–12.9)
POTASSIUM SERPL-SCNC: 3.8 MMOL/L (ref 3.5–5.1)
PROT SERPL-MCNC: 6.5 G/DL (ref 6–8.4)
RBC # BLD AUTO: 4.65 M/UL (ref 4.6–6.2)
SODIUM SERPL-SCNC: 139 MMOL/L (ref 136–145)
TRIGL SERPL-MCNC: 76 MG/DL (ref 30–150)
WBC # BLD AUTO: 5.97 K/UL (ref 3.9–12.7)

## 2023-01-03 PROCEDURE — 83036 HEMOGLOBIN GLYCOSYLATED A1C: CPT | Performed by: INTERNAL MEDICINE

## 2023-01-03 PROCEDURE — 97140 MANUAL THERAPY 1/> REGIONS: CPT | Mod: PO

## 2023-01-03 PROCEDURE — 3074F PR MOST RECENT SYSTOLIC BLOOD PRESSURE < 130 MM HG: ICD-10-PCS | Mod: CPTII,S$GLB,, | Performed by: INTERNAL MEDICINE

## 2023-01-03 PROCEDURE — 85025 COMPLETE CBC W/AUTO DIFF WBC: CPT | Performed by: INTERNAL MEDICINE

## 2023-01-03 PROCEDURE — 97110 THERAPEUTIC EXERCISES: CPT | Mod: PO

## 2023-01-03 PROCEDURE — 99396 PREV VISIT EST AGE 40-64: CPT | Mod: S$GLB,,, | Performed by: INTERNAL MEDICINE

## 2023-01-03 PROCEDURE — 3008F BODY MASS INDEX DOCD: CPT | Mod: CPTII,S$GLB,, | Performed by: INTERNAL MEDICINE

## 2023-01-03 PROCEDURE — 36415 COLL VENOUS BLD VENIPUNCTURE: CPT | Performed by: INTERNAL MEDICINE

## 2023-01-03 PROCEDURE — 1159F PR MEDICATION LIST DOCUMENTED IN MEDICAL RECORD: ICD-10-PCS | Mod: CPTII,S$GLB,, | Performed by: INTERNAL MEDICINE

## 2023-01-03 PROCEDURE — 3008F PR BODY MASS INDEX (BMI) DOCUMENTED: ICD-10-PCS | Mod: CPTII,S$GLB,, | Performed by: INTERNAL MEDICINE

## 2023-01-03 PROCEDURE — 84153 ASSAY OF PSA TOTAL: CPT | Performed by: INTERNAL MEDICINE

## 2023-01-03 PROCEDURE — 84443 ASSAY THYROID STIM HORMONE: CPT | Performed by: INTERNAL MEDICINE

## 2023-01-03 PROCEDURE — 1160F PR REVIEW ALL MEDS BY PRESCRIBER/CLIN PHARMACIST DOCUMENTED: ICD-10-PCS | Mod: CPTII,S$GLB,, | Performed by: INTERNAL MEDICINE

## 2023-01-03 PROCEDURE — 99396 PR PREVENTIVE VISIT,EST,40-64: ICD-10-PCS | Mod: S$GLB,,, | Performed by: INTERNAL MEDICINE

## 2023-01-03 PROCEDURE — 97112 NEUROMUSCULAR REEDUCATION: CPT | Mod: PO

## 2023-01-03 PROCEDURE — 1159F MED LIST DOCD IN RCRD: CPT | Mod: CPTII,S$GLB,, | Performed by: INTERNAL MEDICINE

## 2023-01-03 PROCEDURE — 3078F PR MOST RECENT DIASTOLIC BLOOD PRESSURE < 80 MM HG: ICD-10-PCS | Mod: CPTII,S$GLB,, | Performed by: INTERNAL MEDICINE

## 2023-01-03 PROCEDURE — 99999 PR PBB SHADOW E&M-EST. PATIENT-LVL V: CPT | Mod: PBBFAC,,, | Performed by: INTERNAL MEDICINE

## 2023-01-03 PROCEDURE — 80053 COMPREHEN METABOLIC PANEL: CPT | Performed by: INTERNAL MEDICINE

## 2023-01-03 PROCEDURE — 3078F DIAST BP <80 MM HG: CPT | Mod: CPTII,S$GLB,, | Performed by: INTERNAL MEDICINE

## 2023-01-03 PROCEDURE — 3074F SYST BP LT 130 MM HG: CPT | Mod: CPTII,S$GLB,, | Performed by: INTERNAL MEDICINE

## 2023-01-03 PROCEDURE — 1160F RVW MEDS BY RX/DR IN RCRD: CPT | Mod: CPTII,S$GLB,, | Performed by: INTERNAL MEDICINE

## 2023-01-03 PROCEDURE — 80061 LIPID PANEL: CPT | Performed by: INTERNAL MEDICINE

## 2023-01-03 PROCEDURE — 99999 PR PBB SHADOW E&M-EST. PATIENT-LVL V: ICD-10-PCS | Mod: PBBFAC,,, | Performed by: INTERNAL MEDICINE

## 2023-01-03 NOTE — PROGRESS NOTES
Subjective:       Patient ID: Medardo Vizcanio III is a 52 y.o. male.    Chief Complaint: Annual Exam    Patient here for annual exam.  Is slowly recovering from left shoulder surgery but not doing too just turned 52 and would like to get a colonoscopy, stress test we reviewed his personal and family history history heart disease and thyroid he exercises regularly but would like a stress test  He has intermittent eye pain and intermittent dizziness and would like to see Ophthalmology and ENT    Review of Systems   Constitutional:  Negative for chills, fatigue and fever.   HENT:  Negative for nosebleeds and trouble swallowing.    Eyes:  Negative for pain and visual disturbance.   Respiratory:  Negative for cough, shortness of breath and wheezing.    Cardiovascular:  Negative for chest pain and palpitations.   Gastrointestinal:  Negative for abdominal pain, constipation, diarrhea, nausea and vomiting.   Genitourinary:  Negative for difficulty urinating and hematuria.   Musculoskeletal:  Positive for arthralgias, neck pain and neck stiffness. Negative for back pain.   Integumentary:  Negative for rash.   Neurological:  Negative for dizziness and headaches.   Hematological:  Does not bruise/bleed easily.   Psychiatric/Behavioral:  Negative for dysphoric mood and sleep disturbance.          Past Medical History:   Diagnosis Date    Acromioclavicular joint arthritis 3/20/2014    Hypothyroidism      Past Surgical History:   Procedure Laterality Date    ARTHROSCOPIC DEBRIDEMENT OF SHOULDER Left 10/3/2022    Procedure: DEBRIDEMENT, SHOULDER, ARTHROSCOPIC-LABRUM;  Surgeon: Jef Lawrence MD;  Location: [facility] OR;  Service: Orthopedics;  Laterality: Left;    ARTHROSCOPIC REPAIR OF ROTATOR CUFF OF SHOULDER Left 10/3/2022    Procedure: REPAIR, ROTATOR CUFF, ARTHROSCOPIC;  Surgeon: Jef Lawrence MD;  Location: [facility] OR;  Service: Orthopedics;  Laterality: Left;  Regional with catheter (interscalene)    ARTHROSCOPY OF  SHOULDER WITH DECOMPRESSION OF SUBACROMIAL SPACE Left 10/3/2022    Procedure: ARTHROSCOPY, SHOULDER, WITH SUBACROMIAL SPACE DECOMPRESSION;  Surgeon: Jef Lawrence MD;  Location: [facility] OR;  Service: Orthopedics;  Laterality: Left;    EPIDURAL STEROID INJECTION N/A 2/9/2022    Procedure: Injection, Steroid, Epidural C7/T1  DIRECT REFERRAL;  Surgeon: Deon Strauss MD;  Location: [facility]IN [facility];  Service: Pain Management;  Laterality: N/A;    FIXATION OF TENDON Left 10/3/2022    Procedure: FIXATION, TENDON;  Surgeon: Jef Lawrence MD;  Location: [facility] OR;  Service: Orthopedics;  Laterality: Left;    NASAL SEPTUM SURGERY      SHOULDER ARTHROSCOPY      SHOULDER SURGERY      TONSILLECTOMY      TUMOR EXCISION      arm; lipoma      Patient Active Problem List   Diagnosis    Hypothyroidism due to acquired atrophy of thyroid    Chronic pain of both shoulders    Rotator cuff tear    SLAP (superior labrum from anterior to posterior) tear    Biceps tendonitis    Acromioclavicular joint arthritis    S/P R shoulder surgery, arthroscopic RCR, SAD, DCE, open biceps tenodesis    Lateral epicondylitis of right elbow    Pain in both hands    Chronic pain of both knees    Marijuana user    Muscle tone increased    Primary osteoarthritis involving multiple joints    Other idiopathic scoliosis, thoracolumbar region    Left leg paresthesias    Decreased strength of trunk and back    Family history of arrhythmia    HTN (hypertension), benign    Decreased range of motion of shoulder, left        Objective:      Physical Exam  Constitutional:       General: He is not in acute distress.     Appearance: He is well-developed.   HENT:      Head: Normocephalic and atraumatic.      Right Ear: Tympanic membrane, ear canal and external ear normal.      Left Ear: Tympanic membrane, ear canal and external ear normal.      Nose: Nose normal. No rhinorrhea.      Mouth/Throat:      Pharynx: No oropharyngeal exudate or posterior  oropharyngeal erythema.   Eyes:      Conjunctiva/sclera: Conjunctivae normal.      Pupils: Pupils are equal, round, and reactive to light.   Neck:      Thyroid: No thyromegaly.      Vascular: No JVD.      Comments: No supraclavicular nodes palpated bilaterally.   Cardiovascular:      Rate and Rhythm: Normal rate and regular rhythm.      Pulses: Normal pulses.      Heart sounds: Normal heart sounds. No murmur heard.  Pulmonary:      Effort: Pulmonary effort is normal.      Breath sounds: Normal breath sounds. No wheezing or rales.   Abdominal:      General: Bowel sounds are normal. There is no distension.      Palpations: Abdomen is soft. There is no mass.      Tenderness: There is no abdominal tenderness.   Genitourinary:     Prostate: Normal. Not enlarged and not tender.      Rectum: Normal. Guaiac result negative. No tenderness.   Musculoskeletal:         General: Tenderness (left shoulder/neck) present. Normal range of motion.      Cervical back: Normal range of motion and neck supple.      Right lower leg: No edema.      Left lower leg: No edema.   Lymphadenopathy:      Cervical: No cervical adenopathy.      Upper Body:      Right upper body: No supraclavicular adenopathy.      Left upper body: No supraclavicular adenopathy.   Skin:     General: Skin is warm and dry.      Coloration: Skin is not jaundiced.      Findings: No rash.   Neurological:      General: No focal deficit present.      Mental Status: He is alert and oriented to person, place, and time.      Cranial Nerves: No cranial nerve deficit.      Coordination: Coordination normal.      Deep Tendon Reflexes: Reflexes are normal and symmetric.      Reflex Scores:       Bicep reflexes are 2+ on the right side and 2+ on the left side.       Patellar reflexes are 2+ on the right side and 2+ on the left side.  Psychiatric:         Mood and Affect: Mood normal. Mood is not depressed.         Behavior: Behavior normal.         Thought Content: Thought content  normal.       Assessment:       Problem List Items Addressed This Visit          Endocrine    Hypothyroidism due to acquired atrophy of thyroid    Relevant Orders    Lipid Panel    CBC Auto Differential    TSH       Orthopedic    Chronic pain of both shoulders    S/P R shoulder surgery, arthroscopic RCR, SAD, DCE, open biceps tenodesis     Other Visit Diagnoses       Routine physical examination    -  Primary    Relevant Orders    X-Ray Chest PA And Lateral    Lipid Panel    PSA, Screening    CBC Auto Differential    Comprehensive Metabolic Panel    Hemoglobin A1C    TSH    Ambulatory referral/consult to Endo Procedure     Abnormal EKG        Relevant Orders    Exercise Stress - EKG    X-Ray Chest PA And Lateral    Screening for prostate cancer        Relevant Orders    PSA, Screening    Colon cancer screening        Relevant Orders    Ambulatory referral/consult to Endo Procedure     Tremor of left hand        Possibly postop from left shoulder surgery    Pain of both eyes        Relevant Orders    Ambulatory referral/consult to Ophthalmology    Dizziness        Relevant Orders    Ambulatory referral/consult to ENT    Osteoarthritis of spine with radiculopathy, cervical region                  Plan:         Medardo was seen today for annual exam.    Diagnoses and all orders for this visit:    Routine physical examination  -     X-Ray Chest PA And Lateral; Future  -     Lipid Panel; Future  -     PSA, Screening; Future  -     CBC Auto Differential; Future  -     Comprehensive Metabolic Panel; Future  -     Hemoglobin A1C; Future  -     TSH; Future  -     Ambulatory referral/consult to Endo Procedure ; Future    Hypothyroidism due to acquired atrophy of thyroid  -     Lipid Panel; Future  -     CBC Auto Differential; Future  -     TSH; Future    Chronic pain of both shoulders    S/P R shoulder surgery, arthroscopic RCR, SAD, DCE, open biceps tenodesis    Abnormal EKG  -     Exercise  "Stress - EKG; Future  -     X-Ray Chest PA And Lateral; Future    Screening for prostate cancer  -     PSA, Screening; Future    Colon cancer screening  -     Ambulatory referral/consult to Endo Procedure ; Future    Tremor of left hand  Comments:  Possibly postop from left shoulder surgery    Pain of both eyes  -     Ambulatory referral/consult to Ophthalmology; Future    Dizziness  -     Ambulatory referral/consult to ENT; Future    Osteoarthritis of spine with radiculopathy, cervical region             Follow up Annually.         Portions of this note may have been created with voice recognition software. Occasional "wrong-word" or "sound-a-like" substitutions may have occurred due to the inherent limitations of voice recognition software. Please, read the note carefully and recognize, using context, where substitutions have occurred.  "

## 2023-01-03 NOTE — PROGRESS NOTES
Physical Therapy Daily Treatment Note     Name: Medardo Vizcaino [facility]  Clinic Number: [id number]    Therapy Diagnosis:   Encounter Diagnoses   Name Primary?    Decreased range of motion of shoulder, left Yes    Nontraumatic complete tear of left rotator cuff        Physician: Erik Alarcon PA-C    Visit Date: 1/3/2023    Physician Orders: PT Eval and Treat   Medical Diagnosis from Referral: M75.122 (ICD-10-CM) - Nontraumatic complete tear of left rotator cuff  Evaluation Date: 10/6/2022  Authorization Period Expiration: 12/31/2022  Plan of Care Expiration: 4/6/2022  Progress Note Due: 1/12/2023  Visit # / Visits authorized: 18/ 29  FOTO: 4/5  1st FOTO Follow up:   2nd FOTO Follow up:   Time In: 2:40 pm  Time Out: 3:36 pm  Total Billable Time: 56 minutes    Precautions: Standard and and s/p L medium RTC repair on 10/3/2022    Procedure(s) (LRB):  REPAIR, ROTATOR CUFF, ARTHROSCOPIC (Left)  FIXATION, TENDON (Left)  ARTHROSCOPY, SHOULDER, WITH SUBACROMIAL SPACE DECOMPRESSION (Left)  DEBRIDEMENT, SHOULDER, ARTHROSCOPIC-LABRUM (Left)  Subjective     Pt reports: he is frustrated that he was not made aware that he would be seeing a third physical therapist today. He is also getting frustrated that his left shoulder is not progressing as fast as the right shoulder did after repair 8 years ago, and is getting irritated at hearing that the tendon is still healing as he would like to really be pushed and to have therapy be much more aggressive. He has increased pain with lifting up his arm while lying in bed.  He was compliant with home exercise program.  Response to previous treatment: decreased pain  Functional change: ongoing  Pain: 0/10  Location: Left shoulder    Objective   Medardo demonstrates downwardly rotated and slightly depressed left scapula, with anterior glenohumeral translation noted on the left. There is pain in the deltoid insertion and biceps region with forward flexion that is alleviated with scapular  "upward rotation assistance.  He notes increased pain when lying supine, shoulder in slight extension. This is improved with positioning the shoulder into neutral extension prior to initiating flexion.    Medardo received therapeutic exercises to develop strength, endurance, and ROM for 16 minutes including:  Shoulder ER/IR Supine, yellow band for resistance, 2x10 each direction  Shoulder ER/IR Prone, 1# cuff weight  2x12 each direction  Standing E.R. Isometric Slides Against The Wall, 2x8  Horizontal Adduction from 6 O'Clock to 3 O'Clock Position, 8x3" hold (posterior cuff stretching)    Medardo received the following manual therapy techniques: Joint mobilizations were applied to the: left shoulder for 8 minutes, including:  GH Centration Mobilizations with Posterior Glides Grade III + 5x5" isometric contraction  Prone Mid-Thoracic Mobilizations, Grade IV    Meadrdo participated in neuromuscular re-education activities to improve: Coordination, Kinesthetic, and Proprioception for 32 minutes. The following activities were included:  Hand Heel Rocks + Lower Trap Lift-Off, 12x  Prone Mid Trap Level 3, 2 x 8  Prone Lower Trap Level 2, 12 repetitions  Standing Shoulder Wall Climbs Flexion with YTB around wrists, 2 x 10    Medardo participated in dynamic functional therapeutic activities to improve functional performance for 0 minutes, including:    Home Exercises Provided and Patient Education Provided     Education provided:   - Importance of protecting the repaired shoulder and following precautions (ex. Lifting, using shoulder, improper use of sling)  - Adhesive Capsulitis     Written Home Exercises Provided: Patient instructed to cont prior HEP.  Exercises were reviewed and Medardo was able to demonstrate them prior to the end of the session.  Medardo demonstrated good  understanding of the education provided.     See EMR under Patient Instructions for exercises provided 10/21/2022.    Assessment "   Medardo demonstrates downwardly rotated and slightly depressed left scapula, with anterior glenohumeral translation noted on the left. There is pain in the deltoid insertion and biceps region with forward flexion that is alleviated with scapular upward rotation assistance.  He notes increased pain when lying supine, shoulder in slight extension. This is improved with positioning the shoulder into neutral extension prior to initiating flexion. His exercises for rotator cuff, periscapular, and posterior capsule were progressed today within the parameters that are safe and appropriate for 3 months post-surgery.    Medardo Is progressing well towards his goals.   Pt prognosis is Good.     Pt will continue to benefit from skilled outpatient physical therapy to address the deficits listed in the problem list box on initial evaluation, provide pt/family education and to maximize pt's level of independence in the home and community environment.     Pt's spiritual, cultural and educational needs considered and pt agreeable to plan of care and goals.     Anticipated barriers to physical therapy: none    Goals:   Short Term Goals (12 Weeks):   1. Pt will be independent with HEP to supplement PT in improving functional use of R UE. (MET)   2. Pt will increase pain free L shoulder elevation PROM to >/= 160 deg to improve functional mobility of UE (MET)  3. Pt will increase L shoulder ER PROM in 90 deg abduction to >/=20 deg to improve functional mobility of UE (MET)  Long Term Goals (24 Weeks):   1. Pt will improve FOTO score to </=TBD% limited to decrease perceived limitation with carrying, moving, and handling objects.  2. Pt will increase L shoulder PROM to WNL in all planes to improve functional use of R RUE.  3. Pt will increase L shoulder AROM to WFL in all planes to improve functional use of R RUE.  4. Pt will improve L shoulder MMTs to = 5/5 to promote equal use of B UEs in performing functional tasks.  5. Pt will  lift 10 lb objects without pain to promote functional QOL.  6. Pt to report pain </= 0/10 with ADLs and IADLs using L UE to improve functional QOL.     Plan     Continue to progress GH and ST joint mobility, gradually increase shoulder ROM, scapular muscle control     Coco Jacques PT, DPT  Board Certified in Orthopedic Physical Therapy

## 2023-01-04 ENCOUNTER — TELEPHONE (OUTPATIENT)
Dept: INTERNAL MEDICINE | Facility: CLINIC | Age: 53
End: 2023-01-04
Payer: COMMERCIAL

## 2023-01-04 LAB
COMPLEXED PSA SERPL-MCNC: 0.48 NG/ML (ref 0–4)
TSH SERPL DL<=0.005 MIU/L-ACNC: 1.07 UIU/ML (ref 0.4–4)

## 2023-01-04 NOTE — TELEPHONE ENCOUNTER
Please let him know that his labs look very stable.  Cholesterol is up slightly from last check but it is still below 200. Prostate PSA remains very good as well. Let me know of any questions.

## 2023-01-05 ENCOUNTER — CLINICAL SUPPORT (OUTPATIENT)
Dept: REHABILITATION | Facility: HOSPITAL | Age: 53
End: 2023-01-05
Payer: COMMERCIAL

## 2023-01-05 ENCOUNTER — HOSPITAL ENCOUNTER (OUTPATIENT)
Dept: RADIOLOGY | Facility: HOSPITAL | Age: 53
Discharge: HOME | End: 2023-01-05
Attending: INTERNAL MEDICINE
Payer: COMMERCIAL

## 2023-01-05 ENCOUNTER — HOSPITAL ENCOUNTER (OUTPATIENT)
Dept: CARDIOLOGY | Facility: HOSPITAL | Age: 53
Discharge: HOME | End: 2023-01-05
Attending: INTERNAL MEDICINE
Payer: COMMERCIAL

## 2023-01-05 VITALS — WEIGHT: 165 LBS | HEIGHT: 68 IN | BODY MASS INDEX: 25.01 KG/M2

## 2023-01-05 DIAGNOSIS — M75.122 NONTRAUMATIC COMPLETE TEAR OF LEFT ROTATOR CUFF: ICD-10-CM

## 2023-01-05 DIAGNOSIS — Z00.00 ROUTINE PHYSICAL EXAMINATION: ICD-10-CM

## 2023-01-05 DIAGNOSIS — R94.31 ABNORMAL EKG: ICD-10-CM

## 2023-01-05 DIAGNOSIS — M25.612 DECREASED RANGE OF MOTION OF SHOULDER, LEFT: Primary | ICD-10-CM

## 2023-01-05 LAB
CV STRESS BASE HR: 49 BPM
DIASTOLIC BLOOD PRESSURE: 73 MMHG
OHS CV CPX 1 MINUTE RECOVERY HEART RATE: 142 BPM
OHS CV CPX 85 PERCENT MAX PREDICTED HEART RATE MALE: 145
OHS CV CPX ESTIMATED METS: 17
OHS CV CPX MAX PREDICTED HEART RATE: 170
OHS CV CPX PATIENT IS FEMALE: 0
OHS CV CPX PATIENT IS MALE: 1
OHS CV CPX PEAK DIASTOLIC BLOOD PRESSURE: 77 MMHG
OHS CV CPX PEAK HEAR RATE: 164 BPM
OHS CV CPX PEAK RATE PRESSURE PRODUCT: NORMAL
OHS CV CPX PEAK SYSTOLIC BLOOD PRESSURE: 167 MMHG
OHS CV CPX PERCENT MAX PREDICTED HEART RATE ACHIEVED: 96
OHS CV CPX RATE PRESSURE PRODUCT PRESENTING: 6321
STRESS ECHO POST EXERCISE DUR MIN: 10 MINUTES
STRESS ECHO POST EXERCISE DUR SEC: 0 SECONDS
SYSTOLIC BLOOD PRESSURE: 129 MMHG

## 2023-01-05 PROCEDURE — 93016 EXERCISE STRESS - EKG (CUPID ONLY): ICD-10-PCS | Mod: ,,, | Performed by: INTERNAL MEDICINE

## 2023-01-05 PROCEDURE — 97140 MANUAL THERAPY 1/> REGIONS: CPT | Mod: PO

## 2023-01-05 PROCEDURE — 93016 CV STRESS TEST SUPVJ ONLY: CPT | Mod: ,,, | Performed by: INTERNAL MEDICINE

## 2023-01-05 PROCEDURE — 93018 EXERCISE STRESS - EKG (CUPID ONLY): ICD-10-PCS | Mod: ,,, | Performed by: INTERNAL MEDICINE

## 2023-01-05 PROCEDURE — 71046 XR CHEST PA AND LATERAL: ICD-10-PCS | Mod: 26,,, | Performed by: RADIOLOGY

## 2023-01-05 PROCEDURE — 93018 CV STRESS TEST I&R ONLY: CPT | Mod: ,,, | Performed by: INTERNAL MEDICINE

## 2023-01-05 PROCEDURE — 97110 THERAPEUTIC EXERCISES: CPT | Mod: PO

## 2023-01-05 PROCEDURE — 71046 X-RAY EXAM CHEST 2 VIEWS: CPT | Mod: TC

## 2023-01-05 PROCEDURE — 93017 CV STRESS TEST TRACING ONLY: CPT

## 2023-01-05 PROCEDURE — 97112 NEUROMUSCULAR REEDUCATION: CPT | Mod: PO

## 2023-01-05 PROCEDURE — 71046 X-RAY EXAM CHEST 2 VIEWS: CPT | Mod: 26,,, | Performed by: RADIOLOGY

## 2023-01-05 NOTE — PROGRESS NOTES
"  Physical Therapy Daily Treatment Note     Name: Medardo Vizcaino [facility]  Clinic Number: [id number]    Therapy Diagnosis:   Encounter Diagnoses   Name Primary?    Decreased range of motion of shoulder, left Yes    Nontraumatic complete tear of left rotator cuff      Physician: Erik Alarcon PA-C  Visit Date: 1/5/2023  Physician Orders: PT Eval and Treat   Medical Diagnosis from Referral: M75.122 (ICD-10-CM) - Nontraumatic complete tear of left rotator cuff  Evaluation Date: 10/6/2022  Authorization Period Expiration: 12/31/2022  Plan of Care Expiration: 4/6/2022  Progress Note Due: 1/12/2023  Visit # / Visits authorized: 19/ 29 (2/20)  FOTO: 4/5  1st FOTO Follow up:   2nd FOTO Follow up:   Time In: 12:30 pm  Time Out: 1:30 pm  Total Billable Time: 60 minutes  Precautions: Standard and and s/p L medium RTC repair on 10/3/2022  Procedure(s) (LRB):  REPAIR, ROTATOR CUFF, ARTHROSCOPIC (Left)  FIXATION, TENDON (Left)  ARTHROSCOPY, SHOULDER, WITH SUBACROMIAL SPACE DECOMPRESSION (Left)  DEBRIDEMENT, SHOULDER, ARTHROSCOPIC-LABRUM (Left)  Subjective   Pt reports: the last session was "better," but he was still not sore after our session, and he would like to be pushed further. He would like to "sweat" to know that he is getting a good workout and that he does not feel like he is getting his money's worth. He also notes occassional tingling in the left near his shoulder blade.  He was compliant with home exercise program.  Response to previous treatment: decreased pain  Functional change: ongoing  Pain: 0/10  Location: Left shoulder    Objective     Medardo received therapeutic exercises to develop strength, endurance, and ROM for 20 minutes including:  Shoulder ER/IR Supine, yellow band for resistance, 2x10 each direction  Shoulder IR Prone, 0# cuff weight  x12 each direction, 2" hold  Standing E.R. Isometric Slides Against The Wall, 2x10 + yellow band  Posterior Cuff Stretching at Wall, 3x30" hold,     Medardo received " "the following manual therapy techniques: Joint mobilizations were applied to the: left shoulder for 18 minutes, including:  GH A//P Glide, Grade III Sustained Hold, 3x1 minute  GH Inferior Seymour + End-Range Flexion, Grade IV  Biceps Muscle Bending Myofasical Technique  Prone Mid-Thoracic Mobilizations, Grade V  Prone Grade V (L) Rib Thrust Mobilization, Ribs 3-5    Medardo participated in neuromuscular re-education activities to improve: Coordination, Kinesthetic, and Proprioception for 22 minutes. The following activities were included:  Prone Mid Trap Level 3, 1# cuff weights, 12x5 pulses  Prone Lower Trap Level 2, + 2000g ball 12 repetitions, 2" hold  Standing Shoulder Wall Climbs Flexion with YTB around wrists, 2 x 10  Wall Slides + Upper Trapezius Level 3 with Foam Roller, yellow band around wrists, 3x8    Medardo participated in dynamic functional therapeutic activities to improve functional performance for 0 minutes, including:    Home Exercises Provided and Patient Education Provided     Education provided:   - Importance of protecting the repaired shoulder and following precautions (ex. Lifting, using shoulder, improper use of sling)  - Adhesive Capsulitis     Written Home Exercises Provided: Patient instructed to cont prior HEP.  Exercises were reviewed and Medardo was able to demonstrate them prior to the end of the session.  Medardo demonstrated good  understanding of the education provided.     See EMR under Patient Instructions for exercises provided 10/21/2022.    Assessment   Medardo exhibits shortness of the left biceps muscle, as noted by increased anterior glenohumeral glide during full passive elbow extension, and this, as well as posterior capsular joint mobility was addressed this visit to further address limitations in range of motion. Medardo was assessed for thoracic and rib mobility impairments that could be contributing to reported parasthesia in the back near the left shoulder " blade, and although he did not report onset of tingling it was noted that left ribs 3-5 and mid-thoracic mobility were limited. Endurance in periscapular and rotator cuff muscles was addressed again this visit.    Medardo Is progressing well towards his goals.   Pt prognosis is Good.     Pt will continue to benefit from skilled outpatient physical therapy to address the deficits listed in the problem list box on initial evaluation, provide pt/family education and to maximize pt's level of independence in the home and community environment.     Pt's spiritual, cultural and educational needs considered and pt agreeable to plan of care and goals.     Anticipated barriers to physical therapy: none    Goals:   Short Term Goals (12 Weeks):   1. Pt will be independent with HEP to supplement PT in improving functional use of R UE. (MET)   2. Pt will increase pain free L shoulder elevation PROM to >/= 160 deg to improve functional mobility of UE (MET)  3. Pt will increase L shoulder ER PROM in 90 deg abduction to >/=20 deg to improve functional mobility of UE (MET)  Long Term Goals (24 Weeks):   1. Pt will improve FOTO score to </=TBD% limited to decrease perceived limitation with carrying, moving, and handling objects.  2. Pt will increase L shoulder PROM to WNL in all planes to improve functional use of R RUE.  3. Pt will increase L shoulder AROM to WFL in all planes to improve functional use of R RUE.  4. Pt will improve L shoulder MMTs to = 5/5 to promote equal use of B UEs in performing functional tasks.  5. Pt will lift 10 lb objects without pain to promote functional QOL.  6. Pt to report pain </= 0/10 with ADLs and IADLs using L UE to improve functional QOL.     Plan     Continue to progress as per PT plan of care.    Coco Jacques PT, DPT  Board Certified in Orthopedic Physical Therapy

## 2023-01-06 ENCOUNTER — TELEPHONE (OUTPATIENT)
Dept: INTERNAL MEDICINE | Facility: CLINIC | Age: 53
End: 2023-01-06
Payer: COMMERCIAL

## 2023-01-06 NOTE — TELEPHONE ENCOUNTER
----- Message from Arron Chen MD sent at 1/5/2023  8:27 PM CST -----  Regarding: CXR and STRESS TEST  Please let the pt know that his CXR and stress test were both fine. All looked good and let me know of any questions.

## 2023-01-09 ENCOUNTER — CLINICAL SUPPORT (OUTPATIENT)
Dept: REHABILITATION | Facility: HOSPITAL | Age: 53
End: 2023-01-09
Payer: COMMERCIAL

## 2023-01-09 DIAGNOSIS — M75.122 NONTRAUMATIC COMPLETE TEAR OF LEFT ROTATOR CUFF: ICD-10-CM

## 2023-01-09 DIAGNOSIS — M25.612 DECREASED RANGE OF MOTION OF SHOULDER, LEFT: Primary | ICD-10-CM

## 2023-01-09 PROCEDURE — 97110 THERAPEUTIC EXERCISES: CPT | Mod: PO

## 2023-01-09 PROCEDURE — 97112 NEUROMUSCULAR REEDUCATION: CPT | Mod: PO

## 2023-01-09 PROCEDURE — 97140 MANUAL THERAPY 1/> REGIONS: CPT | Mod: PO

## 2023-01-09 NOTE — PROGRESS NOTES
Physical Therapy Daily Treatment Note     Name: Medardo Vizcaino [facility]  Clinic Number: [id number]  Therapy Diagnosis:   Encounter Diagnoses   Name Primary?    Decreased range of motion of shoulder, left Yes    Nontraumatic complete tear of left rotator cuff      Physician: Erik Alarcon PA-C  Visit Date: 1/9/2023  Physician Orders: PT Eval and Treat   Medical Diagnosis from Referral: M75.122 (ICD-10-CM) - Nontraumatic complete tear of left rotator cuff  Evaluation Date: 10/6/2022  Authorization Period Expiration: 12/31/2022  Plan of Care Expiration: 4/6/2022  Progress Note Due: 1/12/2023  Visit # / Visits authorized: 19/ 29 (3/20)  FOTO: 4/5  1st FOTO Follow up:   2nd FOTO Follow up:   Time In: 10:50 am  Time Out: 11:50 am  Total Billable Time: 60 minutes  Precautions: Standard and and s/p L medium RTC repair on 10/3/2022  Procedure(s) (LRB):  REPAIR, ROTATOR CUFF, ARTHROSCOPIC (Left)  FIXATION, TENDON (Left)  ARTHROSCOPY, SHOULDER, WITH SUBACROMIAL SPACE DECOMPRESSION (Left)  DEBRIDEMENT, SHOULDER, ARTHROSCOPIC-LABRUM (Left)  Subjective   Medardo is 14 weeks post-operative as of 1/9/2023.  Pt reports:he felt good after his last session. He continues to experience tingling in the back of his scapula, near the inferior border of which the cause is unknown. He noticed no change after rib or thoracic mobilizations.  He was compliant with home exercise program.  Response to previous treatment: decreased pain  Functional change: ongoing  Pain: 0/10  Location: Left shoulder    Objective   Shoulder E.R. at 0 degrees: 70 degrees  Shoulder E.R. at 45 degrees: 70 degrees  Shoulder E.R. at 90 degrees: 85 degrees  Shoulder I.R. at 45 degrees: 45 degrees  Shoulder E.R. at 90 degrees: 50 degrees    Medardo received therapeutic exercises to develop strength, endurance, and ROM for 30 minutes including:  Shoulder ER/IR Supine, yellow band for resistance, 2x12 each direction  PNF Patterns, supine active range of motion, D1 and  "D2 extension, Red Therabar Oscillations, 10x each direction  Thoracic Extension Self-Mobilizations over Full Foam Roller  Standing E.R. Isometric Slides Against The Wall, 2x10 + yellow band  Posterior Cuff Stretching at Wall, 3x30" hold, cueing for internal rotation albeit to monitor for pain    Medardo received the following manual therapy techniques: Joint mobilizations were applied to the: left shoulder for 25 minutes, including:  GH A//P Glide, Grade III Sustained Hold, 3x1 minute  GH Inferior Towner + End-Range Flexion, Grade IV  MET's for Shoulder E.R. at 45 degrees, 5x5"  MET's for Shoulder I.R. at 90 degrees, 5x5"  MET for Shoulder Flexion, 5x5"  Grade V Thoracic Manipulation, prone    Medardo participated in neuromuscular re-education activities to improve: Coordination, Kinesthetic, and Proprioception for 5 minutes. The following activities were included:  Prone Lower Trap Level 2, + 2000g ball 12 repetitions, 2" hold    Medardo participated in dynamic functional therapeutic activities to improve functional performance for 0 minutes, including:    Home Exercises Provided and Patient Education Provided     Education provided:   - Importance of protecting the repaired shoulder and following precautions (ex. Lifting, using shoulder, improper use of sling)  - Adhesive Capsulitis     Written Home Exercises Provided: Patient instructed to cont prior HEP.  Exercises were reviewed and Medardo was able to demonstrate them prior to the end of the session.  Medardo demonstrated good  understanding of the education provided.     See EMR under Patient Instructions for exercises provided 10/21/2022.    Assessment   Increased time was spent today in taking objective measures to assess residual mobility deficits. Medardo continues to demonstrate a reported 2-3/10 pain with any position or movement that creates an anterior glide of the humeral head. Internal rotation range of motion remains most restricted at " between 45-50 degrees without anterior glide of humeral head. Posterior capsule has begun, albeit Medardo was shows was to increase the stretch with strong encouragement to avoid pain or overstretching. He noted fatigue after exercises this visit and slight pain, albeit this subsided immediately after activity was ceased.    Medardo Is progressing well towards his goals.   Pt prognosis is Good.     Pt will continue to benefit from skilled outpatient physical therapy to address the deficits listed in the problem list box on initial evaluation, provide pt/family education and to maximize pt's level of independence in the home and community environment.     Pt's spiritual, cultural and educational needs considered and pt agreeable to plan of care and goals.     Anticipated barriers to physical therapy: none    Goals:   Short Term Goals (12 Weeks):   1. Pt will be independent with HEP to supplement PT in improving functional use of R UE. (MET)   2. Pt will increase pain free L shoulder elevation PROM to >/= 160 deg to improve functional mobility of UE (MET)  3. Pt will increase L shoulder ER PROM in 90 deg abduction to >/=20 deg to improve functional mobility of UE (MET)  Long Term Goals (24 Weeks):   1. Pt will improve FOTO score to </=TBD% limited to decrease perceived limitation with carrying, moving, and handling objects.  2. Pt will increase L shoulder PROM to WNL in all planes to improve functional use of R RUE.  3. Pt will increase L shoulder AROM to WFL in all planes to improve functional use of R RUE.  4. Pt will improve L shoulder MMTs to = 5/5 to promote equal use of B UEs in performing functional tasks.  5. Pt will lift 10 lb objects without pain to promote functional QOL.  6. Pt to report pain </= 0/10 with ADLs and IADLs using L UE to improve functional QOL.     Plan     Continue to progress as per PT plan of care.    Coco Jacques PT, DPT  Board Certified in Orthopedic Physical Therapy

## 2023-01-12 ENCOUNTER — CLINICAL SUPPORT (OUTPATIENT)
Dept: REHABILITATION | Facility: HOSPITAL | Age: 53
End: 2023-01-12
Payer: COMMERCIAL

## 2023-01-12 DIAGNOSIS — M75.122 NONTRAUMATIC COMPLETE TEAR OF LEFT ROTATOR CUFF: ICD-10-CM

## 2023-01-12 DIAGNOSIS — M25.612 DECREASED RANGE OF MOTION OF SHOULDER, LEFT: Primary | ICD-10-CM

## 2023-01-12 PROCEDURE — 97140 MANUAL THERAPY 1/> REGIONS: CPT | Mod: PO

## 2023-01-12 PROCEDURE — 97110 THERAPEUTIC EXERCISES: CPT | Mod: PO

## 2023-01-12 PROCEDURE — 97112 NEUROMUSCULAR REEDUCATION: CPT | Mod: PO

## 2023-01-12 NOTE — PROGRESS NOTES
Physical Therapy Daily Treatment Note     [facility]  Cl[facility] Number: [id number]  Therapy Diagnosis:   Encounter Diagnoses   Name Primary?    Decreased range of motion of shoulder, left Yes    Nontraumatic complete tear of left rotator cuff      Physician: Erik Alarcon PA-C  Visit Date: 1/12/2023  Physician Orders: PT Eval and Treat   Medical Diagnosis from Referral: M75.122 (ICD-10-CM) - Nontraumatic complete tear of left rotator cuff  Evaluation Date: 10/6/2022  Authorization Period Expiration: 12/31/2022  Plan of Care Expiration: 4/6/2022  Progress Note Due: 1/12/2023  Visit # / Visits authorized: 4/20  FOTO: 4/5  1st FOTO Follow up:   2nd FOTO Follow up:   Time In: 11:00 am  Time Out: 12:00 pm  Total Billable Time: 60 minutes  Precautions: Standard and and s/p L medium RTC repair on 10/3/2022  Procedure(s) (LRB):  REPAIR, ROTATOR CUFF, ARTHROSCOPIC (Left)  FIXATION, TENDON (Left)  ARTHROSCOPY, SHOULDER, WITH SUBACROMIAL SPACE DECOMPRESSION (Left)  DEBRIDEMENT, SHOULDER, ARTHROSCOPIC-LABRUM (Left)    Subjective   Medardo is 14 weeks post-operative as of 1/9/2023.  Pt reports:his shoulder is doing well and he has nothing to report in change. He does feel that his flexibility and symmetry is improving   He was compliant with home exercise program.  Response to previous treatment: decreased pain  Functional change: ongoing  Pain: 0/10  Location: Left shoulder    Objective     Medardo received therapeutic exercises to develop strength, endurance, and ROM for 30 minutes including:  Shoulder ER/IR Supine, yellow band for resistance, 2x12 each direction  PNF Patterns, supine active range of motion, D1 and D2 flexion and extension in both patterns, red band, 12x each direction  Quadruped Weight-Shifting to Left Arm with Shoulder Tap, 2x10  Wall E.R. Dynamic at 90/90 with 1# weight + Mobilization with Movement (inferior humeral head glide)  Medardo was shown standing PNF patterns with light  "resistance in standing for home exercise.    Medardo received the following manual therapy techniques: Joint mobilizations were applied to the: left shoulder for 20 minutes, including:  Pectoralis Minor Stretch, left side  Teres Minor/Lat Dorsi Muscle Energy Technique for Improved range of motion   GH A//P Glide, Grade III Sustained Hold, 3x1 minute  GH Inferior Glide + End-Range Flexion + E.R. , Grade III  Grade V Thoracic Manipulation, prone    Medardo participated in neuromuscular re-education activities to improve: Coordination, Kinesthetic, and Proprioception for 10 minutes. The following activities were included:  Prone Middle Trapezius Level 3, 12 repetitions, 5" hold  Body Blade at 20, 40 and 60 degrees of Scaption, 20" oscillations each    Medardo participated in dynamic functional therapeutic activities to improve functional performance for 0 minutes, including:    Home Exercises Provided and Patient Education Provided     Education provided:   - Importance of protecting the repaired shoulder and following precautions (ex. Lifting, using shoulder, improper use of sling)  - Adhesive Capsulitis     Written Home Exercises Provided: Patient instructed to cont prior HEP.  Exercises were reviewed and Medardo was able to demonstrate them prior to the end of the session.  Medardo demonstrated good  understanding of the education provided.     See EMR under Patient Instructions for exercises provided 10/21/2022.  Assessment   Medardo demonstrates shoulder flexion within normal limits, although continued deficits in end-range flexion and full external rotation. He is noted to have symmetry in supine position with pectoralis minor with decreased flexibility of the pectoralis major and teres major. He was progressed in closed-chain co-contraction in a quadruped position as well as with functional PNF patterns that he reports fatigue with after treatment. He was taught the correct movements with the " lightest resistance bands for home. He assured understanding of the technique and declined a print-out. He continues to be challenged with internal rotation range of motion as noted with decreased range prior to superior and anterior humeral head glide with this motion.    Medardo Is progressing well towards his goals.   Pt prognosis is Good.     Pt will continue to benefit from skilled outpatient physical therapy to address the deficits listed in the problem list box on initial evaluation, provide pt/family education and to maximize pt's level of independence in the home and community environment.     Pt's spiritual, cultural and educational needs considered and pt agreeable to plan of care and goals.     Anticipated barriers to physical therapy: none    Goals:   Short Term Goals (12 Weeks):   1. Pt will be independent with HEP to supplement PT in improving functional use of R UE. (MET)   2. Pt will increase pain free L shoulder elevation PROM to >/= 160 deg to improve functional mobility of UE (MET)  3. Pt will increase L shoulder ER PROM in 90 deg abduction to >/=20 deg to improve functional mobility of UE (MET)  Long Term Goals (24 Weeks):   1. Pt will improve FOTO score to </=TBD% limited to decrease perceived limitation with carrying, moving, and handling objects.  2. Pt will increase L shoulder PROM to WNL in all planes to improve functional use of R RUE.  3. Pt will increase L shoulder AROM to WFL in all planes to improve functional use of R RUE.  4. Pt will improve L shoulder MMTs to = 5/5 to promote equal use of B UEs in performing functional tasks.  5. Pt will lift 10 lb objects without pain to promote functional QOL.  6. Pt to report pain </= 0/10 with ADLs and IADLs using L UE to improve functional QOL.     Plan     Continue to progress as per PT plan of care.    Coco Jacques PT, DPT  Board Certified in Orthopedic Physical Therapy

## 2023-01-17 ENCOUNTER — CLINICAL SUPPORT (OUTPATIENT)
Dept: REHABILITATION | Facility: HOSPITAL | Age: 53
End: 2023-01-17
Payer: COMMERCIAL

## 2023-01-17 DIAGNOSIS — M25.612 DECREASED RANGE OF MOTION OF SHOULDER, LEFT: Primary | ICD-10-CM

## 2023-01-17 PROCEDURE — 97110 THERAPEUTIC EXERCISES: CPT | Mod: PO

## 2023-01-17 PROCEDURE — 97140 MANUAL THERAPY 1/> REGIONS: CPT | Mod: PO

## 2023-01-17 PROCEDURE — 97112 NEUROMUSCULAR REEDUCATION: CPT | Mod: PO

## 2023-01-17 NOTE — PROGRESS NOTES
Physical Therapy Daily Treatment Note / Progress Note     Name: Medardo Vizcaino [facility]  Clinic Number: [id number]  Therapy Diagnosis:   Encounter Diagnosis   Name Primary?    Decreased range of motion of shoulder, left Yes       Physician: Erik Alarcon PA-C  Visit Date: 1/17/2023  Physician Orders: PT Eval and Treat   Medical Diagnosis from Referral: M75.122 (ICD-10-CM) - Nontraumatic complete tear of left rotator cuff  Evaluation Date: 10/6/2022  Authorization Period Expiration: 12/31/2023  Plan of Care Expiration: 4/6/2022  Progress Note Due: 2/17/2023  Visit # / Visits authorized: 5/20  FOTO: 4/5    1st FOTO Follow up:   2nd FOTO Follow up:     Time In: 1255pm  Time Out: 150pm  Total Billable Time: 55 minutes    Precautions: Standard and and s/p L medium RTC repair on 10/3/2022  Procedure(s) (LRB):  REPAIR, ROTATOR CUFF, ARTHROSCOPIC (Left)  FIXATION, TENDON (Left)  ARTHROSCOPY, SHOULDER, WITH SUBACROMIAL SPACE DECOMPRESSION (Left)  DEBRIDEMENT, SHOULDER, ARTHROSCOPIC-LABRUM (Left)    Subjective   Medardo is 15 weeks post-operative as of 1/17/2023.    Pt reports: that overall he is feeling better and notices his flexibility is better. He does still notice weakness in the shoulder, especially when throwing his covers off of him in bed.   He was compliant with home exercise program.  Response to previous treatment: decreased pain  Functional change: ongoing    Pain: 0/10  Location: Left shoulder    Objective        Shoulder Left Left Pain/Dysfunction with Movement     AROM PROM     Flexion 160 160     Abduction 120 120     External Rotation @90 Abduction 80 80     Internal Rotation @45  Abduction  50 50          Upper Extremity Strength  (R) UE  (L) UE    Shoulder ER 4+/5 Shoulder ER 4/5   Shoulder IR 4+/5 Shoulder IR 4/5       Medardo received therapeutic exercises to develop strength, endurance, and ROM for 15 minutes including:    Supine shoulder ER with arm at 45 deg abduction, with RTB resistance, 3 x  "8  Modified pushup position with hands on mat, alternating shoulder taps, 2 x 8  Wall E.R. Dynamic at 90/90 with 1# weight + Mobilization with Movement (inferior humeral head glide)    Medardo received the following manual therapy techniques: Joint mobilizations were applied to the: left shoulder for 15 minutes, including:    Pectoralis Minor Stretch, left side  Teres Minor/Lat Dorsi Muscle Energy Technique for Improved range of motion   GH A//P Glide, Grade III Sustained Hold, 3x1 minute  GH Inferior Glide + End-Range Flexion + E.R. , Grade III  Grade V Thoracic Manipulation, prone    Medardo participated in neuromuscular re-education activities to improve: Coordination, Kinesthetic, and Proprioception for 25 minutes. The following activities were included:    ER and IR walkouts, arm at 90/90 + overhead press, 15x each direction with RTB  Serratus wall slides with YTB + liftoff, 3 x 6  Prone Middle Trapezius Level 3, 2 x 10 repetitions, 3" hold  Body Blade at 20, 40 and 60 degrees of Scaption, 30" oscillations each  PNF Patterns, supine active range of motion, D1 and D2 flexion and extension in both patterns, red band, 12x each direction    Medardo participated in dynamic functional therapeutic activities to improve functional performance for 0 minutes, including:    Home Exercises Provided and Patient Education Provided     Education provided:   - Importance of protecting the repaired shoulder and following precautions (ex. Lifting, using shoulder, improper use of sling)  - Adhesive Capsulitis     Written Home Exercises Provided: Patient instructed to cont prior HEP.  Exercises were reviewed and Medardo was able to demonstrate them prior to the end of the session.  Medardo demonstrated good  understanding of the education provided.     See EMR under Patient Instructions for exercises provided 10/21/2022.  Assessment     Medardo has been treated for a total of 23 visits over the past 15 weeks for post " operative care following his L medium sized rotator cuff repair. Since the onset of treatment he has made great progress in his GH joint mobility and passive/active ROM. He does display a slight deficit in his end range of flexion that seems to be more related to his rotator cuff control than his passive mobility. Rotator cuff and scapular weakness is still present at this time but his loading is being progressed within his tolerance at this time. He remains a great candidate for skilled PT services at this time to address his continue weakness and functional limitations.     Medardo Is progressing well towards his goals.   Pt prognosis is Good.     Pt will continue to benefit from skilled outpatient physical therapy to address the deficits listed in the problem list box on initial evaluation, provide pt/family education and to maximize pt's level of independence in the home and community environment.     Pt's spiritual, cultural and educational needs considered and pt agreeable to plan of care and goals.     Anticipated barriers to physical therapy: none    Goals:   Short Term Goals (12 Weeks):   1. Pt will be independent with HEP to supplement PT in improving functional use of R UE. (MET)   2. Pt will increase pain free L shoulder elevation PROM to >/= 160 deg to improve functional mobility of UE (MET)  3. Pt will increase L shoulder ER PROM in 90 deg abduction to >/=20 deg to improve functional mobility of UE (MET)  Long Term Goals (24 Weeks):   1. Pt will improve FOTO score to </=TBD% limited to decrease perceived limitation with carrying, moving, and handling objects.  2. Pt will increase L shoulder PROM to WNL in all planes to improve functional use of R RUE.  3. Pt will increase L shoulder AROM to WFL in all planes to improve functional use of R RUE.  4. Pt will improve L shoulder MMTs to = 5/5 to promote equal use of B UEs in performing functional tasks.  5. Pt will lift 10 lb objects without pain to  promote functional QOL.  6. Pt to report pain </= 0/10 with ADLs and IADLs using L UE to improve functional QOL.     Plan     Continue to progress as per PT plan of care.    Waldemar Cespedes PT, DPT  Board Certified in Orthopedic Physical Therapy

## 2023-01-19 ENCOUNTER — CLINICAL SUPPORT (OUTPATIENT)
Dept: REHABILITATION | Facility: HOSPITAL | Age: 53
End: 2023-01-19
Payer: COMMERCIAL

## 2023-01-19 DIAGNOSIS — M25.612 DECREASED RANGE OF MOTION OF SHOULDER, LEFT: Primary | ICD-10-CM

## 2023-01-19 PROCEDURE — 97112 NEUROMUSCULAR REEDUCATION: CPT | Mod: PO

## 2023-01-19 PROCEDURE — 97110 THERAPEUTIC EXERCISES: CPT | Mod: PO

## 2023-01-19 PROCEDURE — 97140 MANUAL THERAPY 1/> REGIONS: CPT | Mod: PO

## 2023-01-19 NOTE — PROGRESS NOTES
Physical Therapy Daily Treatment Note / Discharge Note     Name: Medardo Vizcaino [facility]  Clinic Number: [id number]  Therapy Diagnosis:   Encounter Diagnosis   Name Primary?    Decreased range of motion of shoulder, left Yes       Physician: Erik Alarcon PA-C  Visit Date: 1/19/2023  Physician Orders: PT Eval and Treat   Medical Diagnosis from Referral: M75.122 (ICD-10-CM) - Nontraumatic complete tear of left rotator cuff  Evaluation Date: 10/6/2022  Authorization Period Expiration: 12/31/2023  Plan of Care Expiration: 4/6/2022  Progress Note Due: 2/17/2023  Visit # / Visits authorized: 6/20  FOTO: 4/5    1st FOTO Follow up:   2nd FOTO Follow up:     Time In: 100pm  Time Out: 150pm  Total Billable Time: 50 minutes    Precautions: Standard and and s/p L medium RTC repair on 10/3/2022  Procedure(s) (LRB):  REPAIR, ROTATOR CUFF, ARTHROSCOPIC (Left)  FIXATION, TENDON (Left)  ARTHROSCOPY, SHOULDER, WITH SUBACROMIAL SPACE DECOMPRESSION (Left)  DEBRIDEMENT, SHOULDER, ARTHROSCOPIC-LABRUM (Left)    Subjective     Pt reports: that his pain was been well controlled over the past few weeks. He is still working hard on his exercises at home. He feels like he has hit a plateau with PT and would like to continue with exercises at home with his HEP.   He was compliant with home exercise program.  Response to previous treatment: decreased pain  Functional change: ongoing    Pain: 0/10  Location: Left shoulder    Objective        Shoulder Left Left Pain/Dysfunction with Movement     AROM PROM     Flexion 160 160     Abduction 120 120     External Rotation @90 Abduction 80 80     Internal Rotation @45  Abduction  50 50          Upper Extremity Strength  (R) UE  (L) UE    Shoulder ER 4+/5 Shoulder ER 4/5   Shoulder IR 4+/5 Shoulder IR 4/5       Medardo received therapeutic exercises to develop strength, endurance, and ROM for 25 minutes including:    Assessment as above  Education on updated HEP  Supine shoulder ER with arm at 45  "deg abduction, with RTB resistance, 3 x 8  Modified pushup position with hands on mat, alternating shoulder taps, 2 x 8  Standing shoulder flexion, 2#, 2 x 10  Standing shoulder abduction, 2#, 2 x 10   carries with 2#, 3 laps on turf    Medardo received the following manual therapy techniques: Joint mobilizations were applied to the: left shoulder for 10 minutes, including:    Pectoralis Minor Stretch, left side  GH A//P Glide, Grade III Sustained Hold, 3x1 minute  GH Inferior Glide + End-Range Flexion + E.R. , Grade III    Medardo participated in neuromuscular re-education activities to improve: Coordination, Kinesthetic, and Proprioception for 15 minutes. The following activities were included:    ER and IR walkouts, arm at 90/90 + overhead press, 15x each direction with RTB  Serratus wall slides with YTB + liftoff, 3 x 6  Prone Middle Trapezius Level 3, 2 x 10 repetitions, 3" hold  Body Blade at 20, 40 and 60 degrees of Scaption, 30" oscillations each    Medardo participated in dynamic functional therapeutic activities to improve functional performance for 0 minutes, including:    Home Exercises Provided and Patient Education Provided     Education provided:   - Importance of protecting the repaired shoulder and following precautions (ex. Lifting, using shoulder, improper use of sling)  - Adhesive Capsulitis     Written Home Exercises Provided: Patient instructed to cont prior HEP.  Exercises were reviewed and Medardo was able to demonstrate them prior to the end of the session.  Medardo demonstrated good  understanding of the education provided.     See EMR under Patient Instructions for exercises provided 10/21/2022.  Assessment     Medardo has been treated for a total of 24 visits over the past 15+ weeks following his medium rotator cuff repair on the L side. He feels like at this time he has reached a plateau with formal PT and would like to continue his treatment at home with an updated " HEP. He was educated on the importance of continued rotator cuff and scapular strengthening with the addition of closed chain activities and shoulder endurance exercises. The HEP he was given today reflected that education. Medardo will be discharged from skilled PT services at this time to allow him to further progress at home with his HEP. He was educated to reach out to me with any question or concerns regarding his operative shoulder. He verbalized understanding.     Medardo Is progressing well towards his goals.   Pt prognosis is Good.     Pt will continue to benefit from skilled outpatient physical therapy to address the deficits listed in the problem list box on initial evaluation, provide pt/family education and to maximize pt's level of independence in the home and community environment.     Pt's spiritual, cultural and educational needs considered and pt agreeable to plan of care and goals.     Anticipated barriers to physical therapy: none    Goals:   Short Term Goals (12 Weeks):   1. Pt will be independent with HEP to supplement PT in improving functional use of R UE. (MET)   2. Pt will increase pain free L shoulder elevation PROM to >/= 160 deg to improve functional mobility of UE (MET)  3. Pt will increase L shoulder ER PROM in 90 deg abduction to >/=20 deg to improve functional mobility of UE (MET)  Long Term Goals (24 Weeks):   1. Pt will improve FOTO score to </=TBD% limited to decrease perceived limitation with carrying, moving, and handling objects. (Not assessed)  2. Pt will increase L shoulder PROM to WNL in all planes to improve functional use of R RUE. (MET)  3. Pt will increase L shoulder AROM to WFL in all planes to improve functional use of R RUE. (Not met)  4. Pt will improve L shoulder MMTs to = 5/5 to promote equal use of B UEs in performing functional tasks. (Not met)  5. Pt will lift 10 lb objects without pain to promote functional QOL. (Not met)  6. Pt to report pain </= 0/10 with  ADLs and IADLs using L UE to improve functional QOL. (MET)    Plan     Discharge from skilled PT services     Waldemar Cespedes PT, DPT  Board Certified in Orthopedic Physical Therapy

## 2023-01-19 NOTE — PROGRESS NOTES
Physical Therapy Daily Treatment Note / Progress Note     Name: Medardo Vizcaino [facility]  Clinic Number: [id number]  Therapy Diagnosis:   Encounter Diagnosis   Name Primary?    Decreased range of motion of shoulder, left Yes       Physician: Erik Alarcon PA-C  Visit Date: 1/19/2023  Physician Orders: PT Eval and Treat   Medical Diagnosis from Referral: M75.122 (ICD-10-CM) - Nontraumatic complete tear of left rotator cuff  Evaluation Date: 10/6/2022  Authorization Period Expiration: 12/31/2023  Plan of Care Expiration: 4/6/2022  Progress Note Due: 2/17/2023  Visit # / Visits authorized: 6/20  FOTO: 4/5    1st FOTO Follow up:   2nd FOTO Follow up:     Time In: 100pm  Time Out: ***  Total Billable Time: *** minutes    Precautions: Standard and and s/p L medium RTC repair on 10/3/2022  Procedure(s) (LRB):  REPAIR, ROTATOR CUFF, ARTHROSCOPIC (Left)  FIXATION, TENDON (Left)  ARTHROSCOPY, SHOULDER, WITH SUBACROMIAL SPACE DECOMPRESSION (Left)  DEBRIDEMENT, SHOULDER, ARTHROSCOPIC-LABRUM (Left)      Subjective   Medardo is 15 weeks and 3 days post-operative as of 1/19/2023.    Pt reports: that his pain as been well controlled over the past few weeks. He has been working hard at home on his exercises. He feels like he has hit a plateau with PT recently and would like to discontinue formal PT and maintain his exercises at home going forward.   He was compliant with home exercise program.  Response to previous treatment: decreased pain  Functional change: ongoing    Pain: 0/10  Location: Left shoulder    Objective        Shoulder Left Left Pain/Dysfunction with Movement     AROM PROM     Flexion 160 160     Abduction 120 120     External Rotation @90 Abduction 80 80     Internal Rotation @45  Abduction  50 50          Upper Extremity Strength  (R) UE  (L) UE    Shoulder ER 4+/5 Shoulder ER 4/5   Shoulder IR 4+/5 Shoulder IR 4/5       Medardo received therapeutic exercises to develop strength, endurance, and ROM for 25  "minutes including:    Assessment as above  Education on HEP  Supine shoulder ER with arm at 45 deg abduction, with RTB resistance, 3 x 8  Modified pushup position with hands on mat, alternating shoulder taps, 2 x 8  Standing shoulder flexion with 2#, 2 x 8  Standing shoulder abduction with 2#, 2 x 8   carries holding 2#, 3 laps on turf    Medardo received the following manual therapy techniques: Joint mobilizations were applied to the: left shoulder for 10 minutes, including:    Pectoralis Minor Stretch, left side  Teres Minor/Lat Dorsi Muscle Energy Technique for Improved range of motion   GH A//P Glide, Grade III Sustained Hold, 3x1 minute  GH Inferior Glide + End-Range Flexion + E.R. , Grade III  Grade V Thoracic Manipulation, prone    Medardo participated in neuromuscular re-education activities to improve: Coordination, Kinesthetic, and Proprioception for 12 minutes. The following activities were included:    ER and IR walkouts, arm at 90/90 + overhead press, 15x each direction with RTB  Serratus wall slides with RTB + liftoff, 3 x 6  Prone Middle Trapezius Level 3, 2 x 10 repetitions, 3" hold  Body Blade at 20, 40 and 60 degrees of Scaption, 30" oscillations each    Medardo participated in dynamic functional therapeutic activities to improve functional performance for 0 minutes, including:    Home Exercises Provided and Patient Education Provided     Education provided:   - Importance of protecting the repaired shoulder and following precautions (ex. Lifting, using shoulder, improper use of sling)  - Adhesive Capsulitis     Written Home Exercises Provided: Patient instructed to cont prior HEP.  Exercises were reviewed and Medardo was able to demonstrate them prior to the end of the session.  Medardo demonstrated good  understanding of the education provided.     See EMR under Patient Instructions for exercises provided 10/21/2022.  Assessment     Medardo has been treated for a total of 23 " visits over the past 15 weeks for post operative care following his L medium sized rotator cuff repair. Since the onset of treatment he has made great progress in his GH joint mobility and passive/active ROM. He does display a slight deficit in his end range of flexion that seems to be more related to his rotator cuff control than his passive mobility. Rotator cuff and scapular weakness is still present at this time but his loading is being progressed within his tolerance at this time. He remains a great candidate for skilled PT services at this time to address his continue weakness and functional limitations.     Medardo Is progressing well towards his goals.   Pt prognosis is Good.     Pt will continue to benefit from skilled outpatient physical therapy to address the deficits listed in the problem list box on initial evaluation, provide pt/family education and to maximize pt's level of independence in the home and community environment.     Pt's spiritual, cultural and educational needs considered and pt agreeable to plan of care and goals.     Anticipated barriers to physical therapy: none    Goals:   Short Term Goals (12 Weeks):   1. Pt will be independent with HEP to supplement PT in improving functional use of R UE. (MET)   2. Pt will increase pain free L shoulder elevation PROM to >/= 160 deg to improve functional mobility of UE (MET)  3. Pt will increase L shoulder ER PROM in 90 deg abduction to >/=20 deg to improve functional mobility of UE (MET)  Long Term Goals (24 Weeks):   1. Pt will improve FOTO score to </=TBD% limited to decrease perceived limitation with carrying, moving, and handling objects.  2. Pt will increase L shoulder PROM to WNL in all planes to improve functional use of R RUE.  3. Pt will increase L shoulder AROM to WFL in all planes to improve functional use of R RUE.  4. Pt will improve L shoulder MMTs to = 5/5 to promote equal use of B UEs in performing functional tasks.  5. Pt will  lift 10 lb objects without pain to promote functional QOL.  6. Pt to report pain </= 0/10 with ADLs and IADLs using L UE to improve functional QOL.     Plan     Continue to progress as per PT plan of care.    Waldemar Cespedes PT, DPT  Board Certified in Orthopedic Physical Therapy

## 2023-02-27 ENCOUNTER — CLINICAL SUPPORT (OUTPATIENT)
Dept: ENDOSCOPY | Facility: HOSPITAL | Age: 53
End: 2023-02-27
Attending: INTERNAL MEDICINE
Payer: COMMERCIAL

## 2023-02-27 ENCOUNTER — TELEPHONE (OUTPATIENT)
Dept: ENDOSCOPY | Facility: HOSPITAL | Age: 53
End: 2023-02-27

## 2023-02-27 VITALS — WEIGHT: 165 LBS | BODY MASS INDEX: 25.01 KG/M2 | HEIGHT: 68 IN

## 2023-02-27 DIAGNOSIS — Z12.11 COLON CANCER SCREENING: ICD-10-CM

## 2023-02-27 DIAGNOSIS — Z00.00 ROUTINE PHYSICAL EXAMINATION: ICD-10-CM

## 2023-02-27 RX ORDER — SODIUM, POTASSIUM,MAG SULFATES 17.5-3.13G
SOLUTION, RECONSTITUTED, ORAL ORAL
Qty: 1 KIT | Refills: 0 | Status: SHIPPED | OUTPATIENT
Start: 2023-02-27

## 2023-03-02 ENCOUNTER — OFFICE VISIT (OUTPATIENT)
Dept: SPORTS MEDICINE | Facility: CLINIC | Age: 53
End: 2023-03-02
Payer: COMMERCIAL

## 2023-03-02 VITALS
HEIGHT: 68 IN | HEART RATE: 57 BPM | WEIGHT: 171 LBS | DIASTOLIC BLOOD PRESSURE: 75 MMHG | BODY MASS INDEX: 25.91 KG/M2 | SYSTOLIC BLOOD PRESSURE: 113 MMHG

## 2023-03-02 DIAGNOSIS — Z98.890 S/P ROTATOR CUFF REPAIR: Primary | ICD-10-CM

## 2023-03-02 PROCEDURE — 99214 OFFICE O/P EST MOD 30 MIN: CPT | Mod: S$GLB,,, | Performed by: ORTHOPAEDIC SURGERY

## 2023-03-02 PROCEDURE — 3044F HG A1C LEVEL LT 7.0%: CPT | Mod: CPTII,S$GLB,, | Performed by: ORTHOPAEDIC SURGERY

## 2023-03-02 PROCEDURE — 1159F PR MEDICATION LIST DOCUMENTED IN MEDICAL RECORD: ICD-10-PCS | Mod: CPTII,S$GLB,, | Performed by: ORTHOPAEDIC SURGERY

## 2023-03-02 PROCEDURE — 3008F PR BODY MASS INDEX (BMI) DOCUMENTED: ICD-10-PCS | Mod: CPTII,S$GLB,, | Performed by: ORTHOPAEDIC SURGERY

## 2023-03-02 PROCEDURE — 3078F PR MOST RECENT DIASTOLIC BLOOD PRESSURE < 80 MM HG: ICD-10-PCS | Mod: CPTII,S$GLB,, | Performed by: ORTHOPAEDIC SURGERY

## 2023-03-02 PROCEDURE — 99999 PR PBB SHADOW E&M-EST. PATIENT-LVL III: CPT | Mod: PBBFAC,,, | Performed by: ORTHOPAEDIC SURGERY

## 2023-03-02 PROCEDURE — 3044F PR MOST RECENT HEMOGLOBIN A1C LEVEL <7.0%: ICD-10-PCS | Mod: CPTII,S$GLB,, | Performed by: ORTHOPAEDIC SURGERY

## 2023-03-02 PROCEDURE — 99214 PR OFFICE/OUTPT VISIT, EST, LEVL IV, 30-39 MIN: ICD-10-PCS | Mod: S$GLB,,, | Performed by: ORTHOPAEDIC SURGERY

## 2023-03-02 PROCEDURE — 3074F PR MOST RECENT SYSTOLIC BLOOD PRESSURE < 130 MM HG: ICD-10-PCS | Mod: CPTII,S$GLB,, | Performed by: ORTHOPAEDIC SURGERY

## 2023-03-02 PROCEDURE — 3078F DIAST BP <80 MM HG: CPT | Mod: CPTII,S$GLB,, | Performed by: ORTHOPAEDIC SURGERY

## 2023-03-02 PROCEDURE — 3008F BODY MASS INDEX DOCD: CPT | Mod: CPTII,S$GLB,, | Performed by: ORTHOPAEDIC SURGERY

## 2023-03-02 PROCEDURE — 1159F MED LIST DOCD IN RCRD: CPT | Mod: CPTII,S$GLB,, | Performed by: ORTHOPAEDIC SURGERY

## 2023-03-02 PROCEDURE — 3074F SYST BP LT 130 MM HG: CPT | Mod: CPTII,S$GLB,, | Performed by: ORTHOPAEDIC SURGERY

## 2023-03-02 PROCEDURE — 99999 PR PBB SHADOW E&M-EST. PATIENT-LVL III: ICD-10-PCS | Mod: PBBFAC,,, | Performed by: ORTHOPAEDIC SURGERY

## 2023-03-02 NOTE — PROGRESS NOTES
"CC: Left shoulder post op below    Patient is here for his approximately 5 months post op appointment s/p below and is doing well. Patient is doing PT at [facility] location and feels that he is progressing well.      DATE OF SURGERY:  10/3/2022      PREOPERATIVE DIAGNOSES:   1. Left shoulder rotator cuff tear.   2. Left shoulder AC joint arthritis  3. Left shoulder labral tear / biceps tendinopathy     POSTOPERATIVE DIAGNOSES:   1. Left shoulder rotator cuff tear.   2. Left shoulder AC joint arthritis  3. Left shoulder labral tear / biceps tendinopathy     PROCEDURE:   1. Left shoulder arthroscopic rotator cuff repair.   2. Left shoulder arthroscopic distal clavicle excision  3. Left shoulder arthroscopic subacromial decompression.   4. Left shoulder open subpectoral biceps tenodesis  5. Left shoulder arthroscopic debridement labrum      SURGEON: Jef Lawrence M.D.       Review of Systems   Constitution: Negative. Negative for chills, fever and night sweats.    Cardiovascular: Negative for chest pain and syncope.   Respiratory: Negative for cough and shortness of breath.    Gastrointestinal: Negative for abdominal pain and bowel incontinence.      PE:    /75   Pulse (!) 57   Ht 5' 8" (1.727 m)   Wt 77.6 kg (171 lb)   BMI 26.00 kg/m²      Left shoulder    Incisions well healed  Swelling resolved  Compartments soft  Neurovascular status intact in extremity    AROM  Forward elevation 170 degrees  External rotation 60 degrees    Scaption Strength 5/5      Assessment:  5 months s/p left shoulder arthroscopic rotator cuff repair, distal clavicle excision, subacromial decompression, open subpectoral biceps tenodesis, labral debridement    Plan:  1. Continue PT   2. RTC in 3 months      All questions were answered. Instructed patient to call with questions or concerns in the interim.       "

## 2023-04-21 ENCOUNTER — ANESTHESIA EVENT (OUTPATIENT)
Dept: ENDOSCOPY | Facility: HOSPITAL | Age: 53
End: 2023-04-21
Payer: COMMERCIAL

## 2023-04-21 ENCOUNTER — ANESTHESIA (OUTPATIENT)
Dept: ENDOSCOPY | Facility: HOSPITAL | Age: 53
End: 2023-04-21
Payer: COMMERCIAL

## 2023-04-21 ENCOUNTER — HOSPITAL ENCOUNTER (OUTPATIENT)
Facility: HOSPITAL | Age: 53
Discharge: HOME | End: 2023-04-21
Attending: INTERNAL MEDICINE | Admitting: INTERNAL MEDICINE
Payer: COMMERCIAL

## 2023-04-21 VITALS
WEIGHT: 163 LBS | TEMPERATURE: 98 F | HEART RATE: 52 BPM | SYSTOLIC BLOOD PRESSURE: 111 MMHG | DIASTOLIC BLOOD PRESSURE: 79 MMHG | BODY MASS INDEX: 24.71 KG/M2 | HEIGHT: 68 IN | OXYGEN SATURATION: 99 % | RESPIRATION RATE: 16 BRPM

## 2023-04-21 DIAGNOSIS — Z12.11 SCREENING FOR COLON CANCER: ICD-10-CM

## 2023-04-21 PROCEDURE — E9220 PRA ENDO ANESTHESIA: HCPCS | Mod: 33,,, | Performed by: NURSE ANESTHETIST, CERTIFIED REGISTERED

## 2023-04-21 PROCEDURE — E9220 PRA ENDO ANESTHESIA: ICD-10-PCS | Mod: 33,,, | Performed by: NURSE ANESTHETIST, CERTIFIED REGISTERED

## 2023-04-21 PROCEDURE — 45385 PR COLONOSCOPY,REMV LESN,SNARE: ICD-10-PCS | Mod: 33,,, | Performed by: INTERNAL MEDICINE

## 2023-04-21 PROCEDURE — 37000008 HC ANESTHESIA 1ST 15 MINUTES: Performed by: INTERNAL MEDICINE

## 2023-04-21 PROCEDURE — 88305 TISSUE EXAM BY PATHOLOGIST: ICD-10-PCS | Mod: 26,,, | Performed by: PATHOLOGY

## 2023-04-21 PROCEDURE — 25000003 PHARM REV CODE 250: Performed by: NURSE ANESTHETIST, CERTIFIED REGISTERED

## 2023-04-21 PROCEDURE — 37000009 HC ANESTHESIA EA ADD 15 MINS: Performed by: INTERNAL MEDICINE

## 2023-04-21 PROCEDURE — 63600175 PHARM REV CODE 636 W HCPCS: Performed by: NURSE ANESTHETIST, CERTIFIED REGISTERED

## 2023-04-21 PROCEDURE — 45385 COLONOSCOPY W/LESION REMOVAL: CPT | Mod: PT | Performed by: INTERNAL MEDICINE

## 2023-04-21 PROCEDURE — 88305 TISSUE EXAM BY PATHOLOGIST: CPT | Mod: 59 | Performed by: PATHOLOGY

## 2023-04-21 PROCEDURE — 25000003 PHARM REV CODE 250: Performed by: INTERNAL MEDICINE

## 2023-04-21 PROCEDURE — 45385 COLONOSCOPY W/LESION REMOVAL: CPT | Mod: 33,,, | Performed by: INTERNAL MEDICINE

## 2023-04-21 PROCEDURE — 27201089 HC SNARE, DISP (ANY): Performed by: INTERNAL MEDICINE

## 2023-04-21 PROCEDURE — 88305 TISSUE EXAM BY PATHOLOGIST: CPT | Mod: 26,,, | Performed by: PATHOLOGY

## 2023-04-21 RX ORDER — SODIUM CHLORIDE 9 MG/ML
INJECTION, SOLUTION INTRAVENOUS CONTINUOUS
Status: DISCONTINUED | OUTPATIENT
Start: 2023-04-21 | End: 2023-04-21 | Stop reason: HOSPADM

## 2023-04-21 RX ORDER — PROPOFOL 10 MG/ML
VIAL (ML) INTRAVENOUS
Status: DISCONTINUED | OUTPATIENT
Start: 2023-04-21 | End: 2023-04-21

## 2023-04-21 RX ORDER — PROPOFOL 10 MG/ML
VIAL (ML) INTRAVENOUS CONTINUOUS PRN
Status: DISCONTINUED | OUTPATIENT
Start: 2023-04-21 | End: 2023-04-21

## 2023-04-21 RX ORDER — LIDOCAINE HYDROCHLORIDE 20 MG/ML
INJECTION INTRAVENOUS
Status: DISCONTINUED | OUTPATIENT
Start: 2023-04-21 | End: 2023-04-21

## 2023-04-21 RX ORDER — SODIUM CHLORIDE 0.9 % (FLUSH) 0.9 %
10 SYRINGE (ML) INJECTION
Status: DISCONTINUED | OUTPATIENT
Start: 2023-04-21 | End: 2023-04-21 | Stop reason: HOSPADM

## 2023-04-21 RX ADMIN — SODIUM CHLORIDE: 9 INJECTION, SOLUTION INTRAVENOUS at 07:04

## 2023-04-21 RX ADMIN — LIDOCAINE HYDROCHLORIDE 50 MG: 20 INJECTION INTRAVENOUS at 08:04

## 2023-04-21 RX ADMIN — PROPOFOL 150 MCG/KG/MIN: 10 INJECTION, EMULSION INTRAVENOUS at 08:04

## 2023-04-21 RX ADMIN — PROPOFOL 70 MG: 10 INJECTION, EMULSION INTRAVENOUS at 08:04

## 2023-04-21 NOTE — ANESTHESIA POSTPROCEDURE EVALUATION
Anesthesia Post Evaluation    Patient: Medardo Vizcaino III    Procedure(s) Performed: Procedure(s) (LRB):  COLONOSCOPY (N/A)    Final Anesthesia Type: general      Patient location during evaluation: PACU  Patient participation: Yes- Able to Participate  Level of consciousness: awake and alert and oriented  Post-procedure vital signs: reviewed and stable  Pain management: adequate  Airway patency: patent    PONV status at discharge: No PONV  Anesthetic complications: no      Cardiovascular status: blood pressure returned to baseline  Respiratory status: unassisted, room air and spontaneous ventilation  Hydration status: euvolemic  Follow-up not needed.          Vitals Value Taken Time   /79 04/21/23 0925   Temp 36.7 °C (98.1 °F) 04/21/23 0857   Pulse 52 04/21/23 0925   Resp 16 04/21/23 0925   SpO2 99 % 04/21/23 0925         Event Time   Out of Recovery 09:30:26         Pain/Sherri Score: Sherri Score: 10 (4/21/2023  9:12 AM)

## 2023-04-21 NOTE — PROVATION PATIENT INSTRUCTIONS
Discharge Summary/Instructions after an Endoscopic Procedure  Patient Name: Medardo Vizcaino  Patient MRN: [id number]  Patient YOB: 1970  Friday, April 21, 2023  Jeff Hough MD  Dear patient,  As a result of recent federal legislation (The Federal Cures Act), you may   receive lab or pathology results from your procedure in your [facility]   account before your physician is able to contact you. Your physician or   their representative will relay the results to you with their   recommendations at their soonest availability.  Thank you,  RESTRICTIONS:  During your procedure today, you received medications for sedation.  These   medications may affect your judgment, balance and coordination.  Therefore,   for 24 hours, you have the following restrictions:   - DO NOT drive a car, operate machinery, make legal/financial decisions,   sign important papers or drink alcohol.    ACTIVITY:  Today: no heavy lifting, straining or running due to procedural   sedation/anesthesia.  The following day: return to full activity including work.  DIET:  Eat and drink normally unless instructed otherwise.     TREATMENT FOR COMMON SIDE EFFECTS:  - Mild abdominal pain, nausea, belching, bloating or excessive gas:  rest,   eat lightly and use a heating pad.  - Sore Throat: treat with throat lozenges and/or gargle with warm salt   water.  - Because air was used during the procedure, expelling large amounts of air   from your rectum or belching is normal.  - If a bowel prep was taken, you may not have a bowel movement for 1-3 days.    This is normal.  SYMPTOMS TO WATCH FOR AND REPORT TO YOUR PHYSICIAN:  1. Abdominal pain or bloating, other than gas cramps.  2. Chest pain.  3. Back pain.  4. Signs of infection such as: chills or fever occurring within 24 hours   after the procedure.  5. Rectal bleeding, which would show as bright red, maroon, or black stools.   (A tablespoon of blood from the rectum is not serious, especially if    hemorrhoids are present.)  6. Vomiting.  7. Weakness or dizziness.  GO DIRECTLY TO THE NEAREST EMERGENCY ROOM IF YOU HAVE ANY OF THE FOLLOWING:      Difficulty breathing              Chills and/or fever over 101 F   Persistent vomiting and/or vomiting blood   Severe abdominal pain   Severe chest pain   Black, tarry stools   Bleeding- more than one tablespoon   Any other symptom or condition that you feel may need urgent attention  Your doctor recommends these additional instructions:  If any biopsies were taken, your doctors clinic will contact you in 1 to 2   weeks with any results.  - Discharge patient to home (ambulatory).   - Patient has a contact number available for emergencies.  The signs and   symptoms of potential delayed complications were discussed with the   patient.  Return to normal activities tomorrow.  Written discharge   instructions were provided to the patient.   - Resume previous diet.   - Continue present medications.   - Return to primary care physician as previously scheduled.   - Repeat colonoscopy in 3 years for surveillance based on pathology   results.  For questions, problems or results please call your physician - Jeff Hough MD at Work:  [phone].  [facility], EMERGENCY ROOM PHONE NUMBER: [phone]  IF A COMPLICATION OR EMERGENCY SITUATION ARISES AND YOU ARE UNABLE TO REACH   YOUR PHYSICIAN - GO DIRECTLY TO THE EMERGENCY ROOM.  Jeff Hough MD  4/21/2023 8:56:29 AM  This report has been verified and signed electronically.  Dear patient,  As a result of recent federal legislation (The Federal Cures Act), you may   receive lab or pathology results from your procedure in your [facility]   account before your physician is able to contact you. Your physician or   their representative will relay the results to you with their   recommendations at their soonest availability.  Thank you,  PROVATION

## 2023-04-21 NOTE — TRANSFER OF CARE
"Anesthesia Transfer of Care Note    Patient: Medardo Vizcaino III    Procedure(s) Performed: Procedure(s) (LRB):  COLONOSCOPY (N/A)    Patient location: GI    Anesthesia Type: general    Transport from OR: Transported from OR on room air with adequate spontaneous ventilation    Post pain: adequate analgesia    Post assessment: no apparent anesthetic complications and tolerated procedure well    Post vital signs: stable    Level of consciousness: awake, alert and oriented    Nausea/Vomiting: no nausea/vomiting    Complications: none    Transfer of care protocol was followed      Last vitals:   Visit Vitals  BP (!) 97/56   Pulse 60   Temp 36.7 °C (98.1 °F) (Temporal)   Resp 16   Ht 5' 8" (1.727 m)   Wt 73.9 kg (163 lb)   SpO2 96%   BMI 24.78 kg/m²     "

## 2023-04-21 NOTE — H&P
Short Stay Endoscopy History and Physical    PCP - Arron Chen MD  Referring Physician - Arron Chen MD  [address] [place] [address]    Procedure - colonoscopy  ASA - per anesthesia  Mallampati - per anesthesia  History of Anesthesia problems - no  Family history Anesthesia problems -  no   Plan of anesthesia - General    HPI:  This is a 52 y.o. male here for evaluation of: screening    Reflux - no  Dysphagia - no  Abdominal pain - no  Diarrhea - no    ROS:  Constitutional: No fevers, chills, No weight loss  CV: No chest pain  Pulm: No cough, No shortness of breath  GI: see HPI    Medical History:  has a past medical history of Acromioclavicular joint arthritis (3/20/2014) and Hypothyroidism.    Surgical History:  has a past surgical history that includes Tumor excision; Nasal septum surgery; Tonsillectomy; Shoulder surgery; Shoulder arthroscopy; Epidural steroid injection (N/A, 2/9/2022); Arthroscopic repair of rotator cuff of shoulder (Left, 10/3/2022); Fixation of tendon (Left, 10/3/2022); Arthroscopy of shoulder with decompression of subacromial space (Left, 10/3/2022); and Arthroscopic debridement of shoulder (Left, 10/3/2022).    Family History: family history includes Arthritis in his mother; Atrial fibrillation in his mother; Diabetes in his maternal grandfather and maternal grandmother; Heart disease in his paternal grandfather; Hypertension in his father; Hypothyroidism in his mother and sister; No Known Problems in his maternal aunt, maternal uncle, paternal aunt, paternal grandmother, and paternal uncle; Pacemaker/defibrilator in his father..    Social History:  reports that he has never smoked. He has never used smokeless tobacco. He reports current alcohol use. He reports current drug use. Drug: Marijuana.    Review of patient's allergies indicates:   Allergen Reactions    Sulfa (sulfonamide antibiotics) Hives       Medications:   Medications Prior to Admission    Medication Sig Dispense Refill Last Dose    levothyroxine (SYNTHROID) 75 MCG tablet Take 1 tablet by mouth once daily. 90 tablet 0 4/20/2023    omega-3 fatty acids/fish oil (FISH OIL-OMEGA-3 FATTY ACIDS) 300-1,000 mg capsule Take by mouth once daily.   4/20/2023    vitamin B complex (B COMPLEX-VITAMIN B12 ORAL) Take by mouth.   4/20/2023    sodium,potassium,mag sulfates (SUPREP BOWEL PREP KIT) 17.5-3.13-1.6 gram SolR Follow instructions given by Endoscopy scheduling nurse 1 kit 0        Physical Exam:    Vital Signs:   Vitals:    04/21/23 0744   BP: 123/81   Pulse: 69   Resp: 16   Temp: 98.1 °F (36.7 °C)       General Appearance: Well appearing in no acute distress  Lungs: no labored breathing  CVS:  regular rate  Abdomen: non tender    Labs:  Lab Results   Component Value Date    WBC 5.97 01/03/2023    HGB 14.6 01/03/2023    HCT 43.4 01/03/2023     01/03/2023    CHOL 199 01/03/2023    TRIG 76 01/03/2023    HDL 58 01/03/2023    ALT 15 01/03/2023    AST 21 01/03/2023     01/03/2023    K 3.8 01/03/2023     01/03/2023    CREATININE 0.9 01/03/2023    BUN 12 01/03/2023    CO2 21 (L) 01/03/2023    TSH 1.071 01/03/2023    PSA 0.48 01/03/2023    INR 1.0 02/23/2007    HGBA1C 4.8 01/03/2023       I have explained the risks and benefits of this endoscopic procedure to the patient including but not limited to bleeding, inflammation, infection, perforation, and death.      Jeff Hough MD

## 2023-04-21 NOTE — ANESTHESIA PREPROCEDURE EVALUATION
04/21/2023  Medardo Vizcaino III is a 52 y.o., male.    Active Problem List with Overview Notes    Diagnosis Date Noted    HTN (hypertension), benign 10/13/2021    Family history of arrhythmia 10/11/2021    Left leg paresthesias 01/06/2021    Decreased strength of trunk and back 01/06/2021    Other idiopathic scoliosis, thoracolumbar region 10/30/2019    Primary osteoarthritis involving multiple joints 10/29/2019    Muscle tone increased 09/10/2019    Pain in both hands 03/18/2019    Chronic pain of both knees 03/18/2019    Marijuana user 03/18/2019    Lateral epicondylitis of right elbow 06/28/2017    S/P R shoulder surgery, arthroscopic RCR, SAD, DCE, open biceps tenodesis 04/09/2014    Rotator cuff tear 03/20/2014    SLAP (superior labrum from anterior to posterior) tear 03/20/2014    Biceps tendonitis 03/20/2014    Acromioclavicular joint arthritis 03/20/2014    Chronic pain of both shoulders 10/29/2013     Since building a fence 6 months ago  Tried NSAID for weeks without benefit.       Hypothyroidism due to acquired atrophy of thyroid      Past Surgical History:   Procedure Laterality Date    ARTHROSCOPIC DEBRIDEMENT OF SHOULDER Left 10/3/2022    Procedure: DEBRIDEMENT, SHOULDER, ARTHROSCOPIC-LABRUM;  Surgeon: Jef Lawrence MD;  Location: [facility] OR;  Service: Orthopedics;  Laterality: Left;    ARTHROSCOPIC REPAIR OF ROTATOR CUFF OF SHOULDER Left 10/3/2022    Procedure: REPAIR, ROTATOR CUFF, ARTHROSCOPIC;  Surgeon: Jef Lawrence MD;  Location: [facility] OR;  Service: Orthopedics;  Laterality: Left;  Regional with catheter (interscalene)    ARTHROSCOPY OF SHOULDER WITH DECOMPRESSION OF SUBACROMIAL SPACE Left 10/3/2022    Procedure: ARTHROSCOPY, SHOULDER, WITH SUBACROMIAL SPACE DECOMPRESSION;  Surgeon: Jef Lawrence MD;  Location: [facility] OR;  Service: Orthopedics;   Laterality: Left;    EPIDURAL STEROID INJECTION N/A 2/9/2022    Procedure: Injection, Steroid, Epidural C7/T1  DIRECT REFERRAL;  Surgeon: Deon Strauss MD;  Location: [facility];  Service: Pain Management;  Laterality: N/A;    FIXATION OF TENDON Left 10/3/2022    Procedure: FIXATION, TENDON;  Surgeon: Jef Lawrence MD;  Location: [facility] OR;  Service: Orthopedics;  Laterality: Left;    NASAL SEPTUM SURGERY      SHOULDER ARTHROSCOPY      SHOULDER SURGERY      TONSILLECTOMY      TUMOR EXCISION      arm; lipoma     Results for orders placed or performed in visit on 09/21/22   EKG 12-lead    Collection Time: 09/21/22  1:34 PM    Narrative    Test Reason : Z01.810,    Vent. Rate : 042 BPM     Atrial Rate : 042 BPM     P-R Int : 164 ms          QRS Dur : 094 ms      QT Int : 440 ms       P-R-T Axes : 060 059 048 degrees     QTc Int : 367 ms    Marked sinus bradycardia  Early repolarization  Otherwise normal ECG  When compared with ECG of 13-OCT-2021 08:43,  No significant change was found  Confirmed by JOSE BARRETO MD ([id number]) on 9/22/2022 11:31:07 AM    Referred By: MD LISA KAY           Confirmed By:JOSE BARRETO MD     Exercise Stress - EKG  Order# [id number]  Reading physician: John Hernandez MD Ordering physician: Arron Kay MD Study date: 1/5/23     Reason for Exam  Priority: Routine  Dx: Abnormal EKG [R94.31 (ICD-10-CM)]     Conclusion         The ECG portion of the study is negative for ischemia at  and 17 estimated METs.    The patient reported no chest pain during the stress test.    The blood pressure response to stress was normal.    The exercise capacity was excellent.    Pre-op Assessment    I have reviewed the Patient Summary Reports.    I have reviewed the NPO Status.   I have reviewed the Medications.     Review of Systems  Anesthesia Hx:  No problems with previous Anesthesia    Social:  Non-Smoker    Hematology/Oncology:  Hematology Normal   Oncology Normal      EENT/Dental:EENT/Dental Normal   Cardiovascular:   Exercise tolerance: good  Denies Angina. ECG has been reviewed.    Pulmonary:  Pulmonary Normal  Denies Shortness of breath.    Renal/:  Renal/ Normal     Hepatic/GI:  Hepatic/GI Normal    Musculoskeletal:   Arthritis     Neurological:  Neurology Normal    Endocrine:   Hypothyroidism    Dermatological:  Skin Normal    Psych:  Psychiatric Normal           Physical Exam  General: Well nourished, Cooperative, Alert and Oriented    Airway:  Mallampati: II   Mouth Opening: Normal  TM Distance: Normal  Tongue: Normal  Neck ROM: Normal ROM    Dental:  Intact    Chest/Lungs:  Normal Respiratory Rate        Anesthesia Plan  Type of Anesthesia, risks & benefits discussed:    Anesthesia Type: Gen Natural Airway  Intra-op Monitoring Plan: Standard ASA Monitors  Post Op Pain Control Plan: multimodal analgesia  Induction:  IV  Informed Consent: Informed consent signed with the Patient and all parties understand the risks and agree with anesthesia plan.  All questions answered.   ASA Score: 2  Day of Surgery Review of History & Physical: H&P Update referred to the surgeon/provider.    Ready For Surgery From Anesthesia Perspective.     .

## 2023-04-25 LAB
FINAL PATHOLOGIC DIAGNOSIS: NORMAL
GROSS: NORMAL
Lab: NORMAL

## 2023-05-02 ENCOUNTER — CLINICAL SUPPORT (OUTPATIENT)
Dept: AUDIOLOGY | Facility: CLINIC | Age: 53
End: 2023-05-02
Payer: COMMERCIAL

## 2023-05-02 ENCOUNTER — OFFICE VISIT (OUTPATIENT)
Dept: OTOLARYNGOLOGY | Facility: CLINIC | Age: 53
End: 2023-05-02
Payer: COMMERCIAL

## 2023-05-02 VITALS
WEIGHT: 176.38 LBS | DIASTOLIC BLOOD PRESSURE: 83 MMHG | SYSTOLIC BLOOD PRESSURE: 128 MMHG | HEART RATE: 54 BPM | BODY MASS INDEX: 26.82 KG/M2

## 2023-05-02 DIAGNOSIS — H93.8X3 EAR FULLNESS, BILATERAL: ICD-10-CM

## 2023-05-02 DIAGNOSIS — H93.13 TINNITUS, BILATERAL: Primary | ICD-10-CM

## 2023-05-02 PROCEDURE — 99203 OFFICE O/P NEW LOW 30 MIN: CPT | Mod: S$GLB,,, | Performed by: OTOLARYNGOLOGY

## 2023-05-02 PROCEDURE — 92557 COMPREHENSIVE HEARING TEST: CPT | Mod: S$GLB,,,

## 2023-05-02 PROCEDURE — 99999 PR PBB SHADOW E&M-EST. PATIENT-LVL III: ICD-10-PCS | Mod: PBBFAC,,, | Performed by: OTOLARYNGOLOGY

## 2023-05-02 PROCEDURE — 92557 PR COMPREHENSIVE HEARING TEST: ICD-10-PCS | Mod: S$GLB,,,

## 2023-05-02 PROCEDURE — 99999 PR PBB SHADOW E&M-EST. PATIENT-LVL I: CPT | Mod: PBBFAC,,,

## 2023-05-02 PROCEDURE — 92567 PR TYMPA2METRY: ICD-10-PCS | Mod: S$GLB,,,

## 2023-05-02 PROCEDURE — 92567 TYMPANOMETRY: CPT | Mod: S$GLB,,,

## 2023-05-02 PROCEDURE — 99999 PR PBB SHADOW E&M-EST. PATIENT-LVL I: ICD-10-PCS | Mod: PBBFAC,,,

## 2023-05-02 PROCEDURE — 99203 PR OFFICE/OUTPT VISIT, NEW, LEVL III, 30-44 MIN: ICD-10-PCS | Mod: S$GLB,,, | Performed by: OTOLARYNGOLOGY

## 2023-05-02 PROCEDURE — 99999 PR PBB SHADOW E&M-EST. PATIENT-LVL III: CPT | Mod: PBBFAC,,, | Performed by: OTOLARYNGOLOGY

## 2023-05-02 NOTE — PROGRESS NOTES
Medardo Vizcaino III was seen today in the clinic for an audiologic evaluation.  Patient's main complaint was intermittent aural fullness and tinnitus bilaterally.  Mr. Vizcaino reported this is a fairly new problem. He was previously seen at this center and results revealed normal hearing sensitivity bilaterally.     Tympanometry revealed Type A in the right ear and Type A in the left ear.     Audiogram results revealed normal hearing sensitivity bilaterally.      Speech reception thresholds were noted at 10 dB in the right ear and 10 dB in the left ear.    Speech discrimination scores were 100% in the right ear and 100% in the left ear.    Recommendations:  Otologic evaluation  Repeat audiogram as needed  Hearing protection when in noise

## 2023-05-02 NOTE — PROGRESS NOTES
Subjective:       Patient ID: Medardo Vizcaino III is a 52 y.o. male.    Chief Complaint: Ear Fullness (Bilateral for about 6 months.)    Ear Fullness   There is pain in both ears. This is a new (First noticed about 6 months ago. Seen in 2019 for tinnitus bilateral with normal exam and audiogram.) problem. The current episode started more than 1 month ago. Episode frequency: Intermittent. The problem has been waxing and waning. There has been no fever. Pertinent negatives include no coughing, diarrhea, ear discharge, headaches, hearing loss, neck pain, rash, rhinorrhea or vomiting. He has tried nothing for the symptoms.   Review of Systems   Constitutional:  Negative for activity change, appetite change and fever.   HENT:  Negative for congestion, ear discharge, ear pain, hearing loss, nosebleeds, postnasal drip, rhinorrhea and sneezing.    Eyes:  Negative for redness and visual disturbance.   Respiratory:  Negative for apnea, cough, shortness of breath and wheezing.    Cardiovascular:  Negative for chest pain and palpitations.   Gastrointestinal:  Negative for diarrhea and vomiting.   Genitourinary:  Negative for difficulty urinating and frequency.   Musculoskeletal:  Negative for arthralgias, back pain, gait problem and neck pain.   Skin:  Negative for color change and rash.   Neurological:  Negative for dizziness, speech difficulty, weakness and headaches.   Hematological:  Negative for adenopathy. Does not bruise/bleed easily.   Psychiatric/Behavioral:  Negative for agitation and behavioral problems.      Objective:        Constitutional:   Vital signs are normal. He appears well-developed and well-nourished. He is active. Normal speech.      Head:  Normocephalic and atraumatic.     Ears:  Hearing normal to normal and whispered voice; external ear normal without scars, lesions, or masses; ear canal, tympanic membrane, and middle ear normal..     Neck:  Neck normal without thyromegaly masses, asymmetry, normal  tracheal structure, crepitus, and tenderness and full range of motion with neck supple.     Psychiatric:   He has a normal mood and affect. His speech is normal and behavior is normal.     Neurological:   He has neurological normal, alert and oriented.     Skin:   No abrasions, lacerations, lesions, or rashes.       As a result of this patients history and examination findings, a comprehensive audiogram was ordered to determine the level of hearing/hearing loss.       Completely normal and symmetric hearing binaurally. No statistical changes since last audiogram in 7/2019.      Assessment:       1. Ear fullness, bilateral        Plan:       1. Hearing conservation strongly recommended.  2. Trial of amplification is not currently indicated.  3. Re-check of hearing after 62 years of age.  4. F/U with PCP as per schedule.

## 2023-05-11 RX ORDER — LEVOTHYROXINE SODIUM 75 UG/1
TABLET ORAL
Qty: 90 TABLET | Refills: 2 | Status: SHIPPED | OUTPATIENT
Start: 2023-05-11 | End: 2024-02-01

## 2023-05-11 NOTE — TELEPHONE ENCOUNTER
No care due was identified.  [facility] Embedded Care Due Messages. Reference number: [id number].   5/11/2023 3:38:34 PM CDT

## 2023-05-11 NOTE — TELEPHONE ENCOUNTER
Refill Routing Note   Medication(s) are not appropriate for processing by [facility] for the following reason(s):      Due for refill >6 months ago: last dispensed 7/25/22 for 90 day supply per Epic data    [facility] action(s):  Defer Care Due:  None identified          Appointments  past 12m or future 3m with PCP    Date Provider   Last Visit   1/3/2023 Arron Chen MD   Next Visit   Visit date not found Arron Chen MD   ED visits in past 90 days: 0        Note composed:3:42 PM 05/11/2023

## 2023-06-27 ENCOUNTER — OFFICE VISIT (OUTPATIENT)
Dept: OTOLARYNGOLOGY | Facility: CLINIC | Age: 53
End: 2023-06-27
Payer: COMMERCIAL

## 2023-06-27 VITALS
DIASTOLIC BLOOD PRESSURE: 77 MMHG | HEART RATE: 55 BPM | SYSTOLIC BLOOD PRESSURE: 124 MMHG | WEIGHT: 175.94 LBS | BODY MASS INDEX: 26.75 KG/M2

## 2023-06-27 DIAGNOSIS — H68.003 SALPINGITIS OF BOTH EUSTACHIAN TUBES: ICD-10-CM

## 2023-06-27 DIAGNOSIS — J31.0 CHRONIC RHINITIS: Primary | ICD-10-CM

## 2023-06-27 PROCEDURE — 1159F MED LIST DOCD IN RCRD: CPT | Mod: CPTII,S$GLB,, | Performed by: OTOLARYNGOLOGY

## 2023-06-27 PROCEDURE — 1160F PR REVIEW ALL MEDS BY PRESCRIBER/CLIN PHARMACIST DOCUMENTED: ICD-10-PCS | Mod: CPTII,S$GLB,, | Performed by: OTOLARYNGOLOGY

## 2023-06-27 PROCEDURE — 99999 PR PBB SHADOW E&M-EST. PATIENT-LVL III: ICD-10-PCS | Mod: PBBFAC,,, | Performed by: OTOLARYNGOLOGY

## 2023-06-27 PROCEDURE — 99999 PR PBB SHADOW E&M-EST. PATIENT-LVL III: CPT | Mod: PBBFAC,,, | Performed by: OTOLARYNGOLOGY

## 2023-06-27 PROCEDURE — 3008F PR BODY MASS INDEX (BMI) DOCUMENTED: ICD-10-PCS | Mod: CPTII,S$GLB,, | Performed by: OTOLARYNGOLOGY

## 2023-06-27 PROCEDURE — 99213 PR OFFICE/OUTPT VISIT, EST, LEVL III, 20-29 MIN: ICD-10-PCS | Mod: 25,S$GLB,, | Performed by: OTOLARYNGOLOGY

## 2023-06-27 PROCEDURE — 3074F PR MOST RECENT SYSTOLIC BLOOD PRESSURE < 130 MM HG: ICD-10-PCS | Mod: CPTII,S$GLB,, | Performed by: OTOLARYNGOLOGY

## 2023-06-27 PROCEDURE — 31575 DIAGNOSTIC LARYNGOSCOPY: CPT | Mod: S$GLB,,, | Performed by: OTOLARYNGOLOGY

## 2023-06-27 PROCEDURE — 3078F DIAST BP <80 MM HG: CPT | Mod: CPTII,S$GLB,, | Performed by: OTOLARYNGOLOGY

## 2023-06-27 PROCEDURE — 3044F PR MOST RECENT HEMOGLOBIN A1C LEVEL <7.0%: ICD-10-PCS | Mod: CPTII,S$GLB,, | Performed by: OTOLARYNGOLOGY

## 2023-06-27 PROCEDURE — 99213 OFFICE O/P EST LOW 20 MIN: CPT | Mod: 25,S$GLB,, | Performed by: OTOLARYNGOLOGY

## 2023-06-27 PROCEDURE — 31575 LARYNGOSCOPY: ICD-10-PCS | Mod: S$GLB,,, | Performed by: OTOLARYNGOLOGY

## 2023-06-27 PROCEDURE — 1160F RVW MEDS BY RX/DR IN RCRD: CPT | Mod: CPTII,S$GLB,, | Performed by: OTOLARYNGOLOGY

## 2023-06-27 PROCEDURE — 3074F SYST BP LT 130 MM HG: CPT | Mod: CPTII,S$GLB,, | Performed by: OTOLARYNGOLOGY

## 2023-06-27 PROCEDURE — 3078F PR MOST RECENT DIASTOLIC BLOOD PRESSURE < 80 MM HG: ICD-10-PCS | Mod: CPTII,S$GLB,, | Performed by: OTOLARYNGOLOGY

## 2023-06-27 PROCEDURE — 3044F HG A1C LEVEL LT 7.0%: CPT | Mod: CPTII,S$GLB,, | Performed by: OTOLARYNGOLOGY

## 2023-06-27 PROCEDURE — 3008F BODY MASS INDEX DOCD: CPT | Mod: CPTII,S$GLB,, | Performed by: OTOLARYNGOLOGY

## 2023-06-27 PROCEDURE — 1159F PR MEDICATION LIST DOCUMENTED IN MEDICAL RECORD: ICD-10-PCS | Mod: CPTII,S$GLB,, | Performed by: OTOLARYNGOLOGY

## 2023-06-27 NOTE — PATIENT INSTRUCTIONS
Please use Flonase Sensimist (OTC) 2 sprays each nostril every day.  This medication has a fine spray that is able to reach further into the sinuses.    OK to use Cetirizine (Zyrtec) once a day at the same time.

## 2023-06-27 NOTE — PROGRESS NOTES
Subjective:      Medardo Vizcaino III is a 52 y.o. male who was referred to me by Dr. Kendall Villanueva in consultation for ear pressure and congestion.    He relates a long history for many years of generalized sinus problems, often centered around the ears.  He describes a longstanding, perennial nasal congestion, often worse on one side and alternating, and a need to pop the ears, which only occasionally alleviates the symptoms.  He often expels thick mucus from the nose and is unable to suck it down.  He relates a history of tinnitus and dizziness that resolved about 10 years ago.  He also relates prior diagnosis of sleep apnea that resolved after surgery by Dr. Hernandez in 2006 consisting of septoplasty, tonsillectomy and uvulectomy.  He denies hyposmia, rhinorrhea or facial pressure.    Current sinonasal medications include none at present.  The last course of antibiotics was a long time ago.  He does not regularly use nasal decongestant sprays.    He does not recall previously having allergy testing .    He denies a history of asthma.    He denies a history of reflux symptoms.    He denies a diagnosis of obstructive sleep apnea.     He has previously had sinonasal surgery as above.  He has had a tonsillectomy.    He does not recall a prior history of nasal trauma other than the sinonasal surgery.        %         Past Medical History  He has a past medical history of Acromioclavicular joint arthritis and Hypothyroidism.    Past Surgical History  He has a past surgical history that includes Tumor excision; Nasal septum surgery; Tonsillectomy; Shoulder surgery; Shoulder arthroscopy; Epidural steroid injection (N/A, 2/9/2022); Arthroscopic repair of rotator cuff of shoulder (Left, 10/3/2022); Fixation of tendon (Left, 10/3/2022); Arthroscopy of shoulder with decompression of subacromial space (Left, 10/3/2022); Arthroscopic debridement of shoulder (Left, 10/3/2022); and Colonoscopy (N/A, 4/21/2023).    Family  History  His family history includes Arthritis in his mother; Atrial fibrillation in his mother; Diabetes in his maternal grandfather and maternal grandmother; Heart disease in his paternal grandfather; Hypertension in his father; Hypothyroidism in his mother and sister; No Known Problems in his maternal aunt, maternal uncle, paternal aunt, paternal grandmother, and paternal uncle; Pacemaker/defibrilator in his father.    Social History  He reports that he has never smoked. He has never used smokeless tobacco. He reports current alcohol use. He reports current drug use. Drug: Marijuana.    Allergies  He is allergic to sulfa (sulfonamide antibiotics).    Medications   He has a current medication list which includes the following prescription(s): levothyroxine, fish oil-omega-3 fatty acids, sodium,potassium,mag sulfates, and vitamin b complex.    Review of Systems     Constitutional: Negative for appetite change, chills, fatigue, fever and unexpected weight loss.      HENT: Positive for stuffy nose.  Negative for ear discharge, ear infection, ear pain, facial swelling, hearing loss, mouth sores, nosebleeds, postnasal drip, ringing in the ears, runny nose, sinus infection, sinus pressure, sore throat, tonsil infection, dental problems, trouble swallowing and voice change.      Eyes:  Negative for change in eyesight, eye drainage, eye itching and photophobia.     Respiratory:  Negative for cough, shortness of breath, sleep apnea, snoring and wheezing.      Cardiovascular:  Negative for chest pain, foot swelling, irregular heartbeat and swollen veins.     Gastrointestinal:  Negative for abdominal pain, acid reflux, constipation, diarrhea, heartburn and vomiting.     Genitourinary: Negative for difficulty urinating, sexual problems and frequent urination.     Musc: Negative for aching joints, aching muscles, back pain and neck pain.     Skin: Negative for rash.     Allergy: Negative for food allergies and seasonal  allergies.     Endocrine: Negative for cold intolerance and heat intolerance.      Neurological: Negative for dizziness, headaches, light-headedness, seizures and tremors.      Hematologic: Negative for bruises/bleeds easily and swollen glands.      Psychiatric: Negative for decreased concentration, depression, nervous/anxious and sleep disturbance.             Objective:     /77 (BP Location: Right arm, Patient Position: Sitting)   Pulse (!) 55   Wt 79.8 kg (175 lb 14.8 oz)   BMI 26.75 kg/m²        Constitutional:   He appears well-developed. He is cooperative. Normal speech.  No hoarse voice.      Head:  Normocephalic. Salivary glands normal.  Facial strength is normal.      Ears:    Right Ear: No drainage or tenderness. Tympanic membrane is not perforated. Tympanic membrane mobility is normal. No middle ear effusion. No decreased hearing is noted.   Left Ear: No drainage or tenderness. Tympanic membrane is not perforated. Tympanic membrane mobility is normal.  No middle ear effusion. No decreased hearing is noted.     Nose:  Mucosal edema present. No rhinorrhea, septal deviation or polyps. No epistaxis. Turbinate hypertrophy.  Turbinates normal and no turbinate masses.  Right sinus exhibits no maxillary sinus tenderness and no frontal sinus tenderness. Left sinus exhibits no maxillary sinus tenderness and no frontal sinus tenderness.     Mouth/Throat  Oropharynx clear and moist without lesions or asymmetry. He does not have dentures. Normal dentition. No oral lesions or mucous membrane lesions. No oropharyngeal exudate or posterior oropharyngeal erythema. Tonsils not present.  Mirror exam not performed due to patient tolerance.  Mirror exam not performed due to patient tolerance.      Neck:  Neck normal without thyromegaly masses, asymmetry, normal tracheal structure, crepitus, and tenderness, thyroid normal, trachea normal and no adenopathy. Normal range of motion present.     He has no cervical  adenopathy.     Cardiovascular:    Regular rhythm.              Pulmonary/Chest:   Effort normal.     Psychiatric:   He has a normal mood and affect. His speech is normal and behavior is normal.     Neurological:   No cranial nerve deficit.     Skin:   No rash noted.     Procedure    Flexible laryngoscopy performed.  See procedure note.        Data Reviewed    WBC (K/uL)   Date Value   01/03/2023 5.97     Eosinophil % (%)   Date Value   01/03/2023 1.8     Eos # (K/uL)   Date Value   01/03/2023 0.1     Platelets (K/uL)   Date Value   01/03/2023 216     Glucose (mg/dL)   Date Value   01/03/2023 83     No results found for: IGE    No sinus imaging available.    I independently reviewed the tracings of the complete audiometric evaluation performed  5/2/23 .  I reviewed the audiogram with the patient as well.  Pertinent findings include a normal study.           Assessment:     1. Chronic rhinitis    2. Salpingitis of both eustachian tubes         Plan:     I had a long discussion with the patient regarding his condition and the further workup and management options.  I reassured him as to the benign nature of today's findings, consistent with benign sinonasal and nonobstructive eustachian tube inflammation.  I prescribed the daily use of Flonase Sensimist to treat sinonasal inflammation with a preferential delivery mechanism for the posterior nasal cavity and nasopharynx.  He may also try the cetirizine that his family member recently suggested to him.    Follow up if symptoms worsen or fail to improve.

## 2023-06-27 NOTE — PROCEDURES
Laryngoscopy    Date/Time: 6/27/2023 11:00 AM  Performed by: Anibal Agarwal MD  Authorized by: Anibal Agarwal MD     Consent Done?:  Yes (Verbal)  Anesthesia:     Local anesthetic:  Lidocaine 4% and Brandon-Synephrine 1/2%    Patient tolerance:  Patient tolerated the procedure well with no immediate complications  Laryngoscopy:     Areas examined:  Nasopharynx, oropharynx, hypopharynx, larynx, vocal cords and nasal cavities    Laryngoscope size:  4 mm  Nose Intranasal:      Mucosa no polyps     No mucosa lesions present     No septum gross deformity     Turbinates not enlarged  Nasopharynx:      No mucosa lesions     Adenoids not present     Posterior choanae patent     Eustachian tube patent  Larynx/hypopharynx:      No epiglottis lesions     No epiglottis edema     No AE folds lesions     No vocal cord polyps     Equal and normal bilateral     No hypopharynx lesions     No piriform sinus pooling     No piriform sinus lesions     No post cricoid edema     No post cricoid erythema     Mild ET edema and erythema bilaterally  No polyps or purulence

## 2023-07-18 ENCOUNTER — OFFICE VISIT (OUTPATIENT)
Dept: SPORTS MEDICINE | Facility: CLINIC | Age: 53
End: 2023-07-18
Payer: COMMERCIAL

## 2023-07-18 VITALS
SYSTOLIC BLOOD PRESSURE: 121 MMHG | DIASTOLIC BLOOD PRESSURE: 76 MMHG | HEIGHT: 68 IN | WEIGHT: 174.19 LBS | HEART RATE: 57 BPM | BODY MASS INDEX: 26.4 KG/M2

## 2023-07-18 DIAGNOSIS — Z98.890 S/P ROTATOR CUFF REPAIR: Primary | ICD-10-CM

## 2023-07-18 PROCEDURE — 99214 PR OFFICE/OUTPT VISIT, EST, LEVL IV, 30-39 MIN: ICD-10-PCS | Mod: S$GLB,,, | Performed by: ORTHOPAEDIC SURGERY

## 2023-07-18 PROCEDURE — 99999 PR PBB SHADOW E&M-EST. PATIENT-LVL III: ICD-10-PCS | Mod: PBBFAC,,, | Performed by: ORTHOPAEDIC SURGERY

## 2023-07-18 PROCEDURE — 3074F PR MOST RECENT SYSTOLIC BLOOD PRESSURE < 130 MM HG: ICD-10-PCS | Mod: CPTII,S$GLB,, | Performed by: ORTHOPAEDIC SURGERY

## 2023-07-18 PROCEDURE — 3074F SYST BP LT 130 MM HG: CPT | Mod: CPTII,S$GLB,, | Performed by: ORTHOPAEDIC SURGERY

## 2023-07-18 PROCEDURE — 3008F PR BODY MASS INDEX (BMI) DOCUMENTED: ICD-10-PCS | Mod: CPTII,S$GLB,, | Performed by: ORTHOPAEDIC SURGERY

## 2023-07-18 PROCEDURE — 3008F BODY MASS INDEX DOCD: CPT | Mod: CPTII,S$GLB,, | Performed by: ORTHOPAEDIC SURGERY

## 2023-07-18 PROCEDURE — 3044F PR MOST RECENT HEMOGLOBIN A1C LEVEL <7.0%: ICD-10-PCS | Mod: CPTII,S$GLB,, | Performed by: ORTHOPAEDIC SURGERY

## 2023-07-18 PROCEDURE — 1159F PR MEDICATION LIST DOCUMENTED IN MEDICAL RECORD: ICD-10-PCS | Mod: CPTII,S$GLB,, | Performed by: ORTHOPAEDIC SURGERY

## 2023-07-18 PROCEDURE — 3044F HG A1C LEVEL LT 7.0%: CPT | Mod: CPTII,S$GLB,, | Performed by: ORTHOPAEDIC SURGERY

## 2023-07-18 PROCEDURE — 3078F PR MOST RECENT DIASTOLIC BLOOD PRESSURE < 80 MM HG: ICD-10-PCS | Mod: CPTII,S$GLB,, | Performed by: ORTHOPAEDIC SURGERY

## 2023-07-18 PROCEDURE — 3078F DIAST BP <80 MM HG: CPT | Mod: CPTII,S$GLB,, | Performed by: ORTHOPAEDIC SURGERY

## 2023-07-18 PROCEDURE — 99999 PR PBB SHADOW E&M-EST. PATIENT-LVL III: CPT | Mod: PBBFAC,,, | Performed by: ORTHOPAEDIC SURGERY

## 2023-07-18 PROCEDURE — 1159F MED LIST DOCD IN RCRD: CPT | Mod: CPTII,S$GLB,, | Performed by: ORTHOPAEDIC SURGERY

## 2023-07-18 PROCEDURE — 99214 OFFICE O/P EST MOD 30 MIN: CPT | Mod: S$GLB,,, | Performed by: ORTHOPAEDIC SURGERY

## 2023-07-18 NOTE — PROGRESS NOTES
CC: Left shoulder post op below    Patient is here for his approximately 9 months post op appointment s/p below and is doing well. Patient reports shoulder is doing well. He does not some numbness/tingling into the left lower hand while biking and riding a scooter. He reports a history of cervical spine disc issues and previous CSI for this.       DATE OF SURGERY:  10/3/2022      PREOPERATIVE DIAGNOSES:   1. Left shoulder rotator cuff tear.   2. Left shoulder AC joint arthritis  3. Left shoulder labral tear / biceps tendinopathy     POSTOPERATIVE DIAGNOSES:   1. Left shoulder rotator cuff tear.   2. Left shoulder AC joint arthritis  3. Left shoulder labral tear / biceps tendinopathy     PROCEDURE:   1. Left shoulder arthroscopic rotator cuff repair.   2. Left shoulder arthroscopic distal clavicle excision  3. Left shoulder arthroscopic subacromial decompression.   4. Left shoulder open subpectoral biceps tenodesis  5. Left shoulder arthroscopic debridement labrum      SURGEON: Jef Lawrence M.D.       Past Medical History:   Diagnosis Date    Acromioclavicular joint arthritis 3/20/2014    Hypothyroidism        Past Surgical History:   Procedure Laterality Date    ARTHROSCOPIC DEBRIDEMENT OF SHOULDER Left 10/3/2022    Procedure: DEBRIDEMENT, SHOULDER, ARTHROSCOPIC-LABRUM;  Surgeon: Jef Lawrence MD;  Location: [facility] OR;  Service: Orthopedics;  Laterality: Left;    ARTHROSCOPIC REPAIR OF ROTATOR CUFF OF SHOULDER Left 10/3/2022    Procedure: REPAIR, ROTATOR CUFF, ARTHROSCOPIC;  Surgeon: Jef Lawrence MD;  Location: [facility] OR;  Service: Orthopedics;  Laterality: Left;  Regional with catheter (interscalene)    ARTHROSCOPY OF SHOULDER WITH DECOMPRESSION OF SUBACROMIAL SPACE Left 10/3/2022    Procedure: ARTHROSCOPY, SHOULDER, WITH SUBACROMIAL SPACE DECOMPRESSION;  Surgeon: Jef Lawrence MD;  Location: [facility] OR;  Service: Orthopedics;  Laterality: Left;    COLONOSCOPY N/A 4/21/2023    Procedure:  COLONOSCOPY;  Surgeon: Jeff Hough MD;  Location: [facility] ENDO (4TH FLR);  Service: Endoscopy;  Laterality: N/A;  pt wants prep inst mailed-RB  no answer for precall/mleone lpn    EPIDURAL STEROID INJECTION N/A 2/9/2022    Procedure: Injection, Steroid, Epidural C7/T1  DIRECT REFERRAL;  Surgeon: Deon Strauss MD;  Location: [facility] PAIN MGT;  Service: Pain Management;  Laterality: N/A;    FIXATION OF TENDON Left 10/3/2022    Procedure: FIXATION, TENDON;  Surgeon: Jef Lawrence MD;  Location: [facility] OR;  Service: Orthopedics;  Laterality: Left;    NASAL SEPTUM SURGERY      SHOULDER ARTHROSCOPY      SHOULDER SURGERY      TONSILLECTOMY      TUMOR EXCISION      arm; lipoma       Family History   Problem Relation Age of Onset    Hypertension Father     Pacemaker/defibrilator Father         Pacemaker    Diabetes Maternal Grandmother     Diabetes Maternal Grandfather     Heart disease Paternal Grandfather     Arthritis Mother     Hypothyroidism Mother     Atrial fibrillation Mother     Hypothyroidism Sister     No Known Problems Paternal Grandmother     No Known Problems Maternal Aunt     No Known Problems Maternal Uncle     No Known Problems Paternal Aunt     No Known Problems Paternal Uncle     Amblyopia Neg Hx     Blindness Neg Hx     Cancer Neg Hx     Cataracts Neg Hx     Glaucoma Neg Hx     Macular degeneration Neg Hx     Retinal detachment Neg Hx     Strabismus Neg Hx     Stroke Neg Hx     Thyroid disease Neg Hx     Psoriasis Neg Hx     Inflammatory bowel disease Neg Hx     Lupus Neg Hx          Current Outpatient Medications:     levothyroxine (SYNTHROID) 75 MCG tablet, Take 1 tablet by mouth once daily., Disp: 90 tablet, Rfl: 2    omega-3 fatty acids/fish oil (FISH OIL-OMEGA-3 FATTY ACIDS) 300-1,000 mg capsule, Take by mouth once daily., Disp: , Rfl:     sodium,potassium,mag sulfates (SUPREP BOWEL PREP KIT) 17.5-3.13-1.6 gram SolR, Follow instructions given by Endoscopy scheduling nurse, Disp: 1 kit, Rfl:  "0    vitamin B complex (B COMPLEX-VITAMIN B12 ORAL), Take by mouth., Disp: , Rfl:     Review of patient's allergies indicates:   Allergen Reactions    Sulfa (sulfonamide antibiotics) Hives          REVIEW OF SYSTEMS:  Constitution: Negative. Negative for chills, fever and night sweats.   HENT: Negative for congestion and headaches.    Eyes: Negative for blurred vision, left vision loss and right vision loss.   Cardiovascular: Negative for chest pain and syncope.   Respiratory: Negative for cough and shortness of breath.    Endocrine: Negative for polydipsia, polyphagia and polyuria.   Hematologic/Lymphatic: Negative for bleeding problem. Does not bruise/bleed easily.   Skin: Negative for dry skin, itching and rash.   Musculoskeletal: Negative for falls.  Positive for left shoulder pain and muscle weakness.   Gastrointestinal: Negative for abdominal pain and bowel incontinence.   Genitourinary: Negative for bladder incontinence and nocturia.   Neurological: Negative for disturbances in coordination, loss of balance and seizures.   Psychiatric/Behavioral: Negative for depression. The patient does not have insomnia.    Allergic/Immunologic: Negative for hives and persistent infections.      PHYSICAL EXAMINATION:  Vitals:  /76   Pulse (!) 57   Ht 5' 8" (1.727 m)   Wt 79 kg (174 lb 2.6 oz)   BMI 26.48 kg/m²    General: The patient is alert and oriented x 3.  Mood is pleasant.  Observation of ears, eyes and nose reveal no gross abnormalities.  No labored breathing observed.  Gait is coordinated. Patient can toe walk and heel walk without difficulty.      LEFT Shoulder / Upper Extremity Exam    OBSERVATION:     Swelling  none  Deformity  none   Discoloration  none   Scapular winging none   Scars   none  Atrophy  none    TENDERNESS / CREPITUS (T/C):          T/C      T/C   Clavicle   -/-  SUPRAspinatus    -/-     AC Jt.    -/-  INFRAspinatus  -/-    SC Jt.    -/-  Deltoid    -/-      G. Tuberosity  -/-  LH BICEP " groove  -/-   Acromion:  -/-  Midline Neck   -/-     Scapular Spine -/-  Trapezium   -/-   SMA Scapula  -/-  GH jt. line - post  -/-     Scapulothoracic  -/-         ROM: (* = with pain)  Right shoulder   Left shoulder        AROM (PROM)   AROM (PROM)   FE    170° (175°)     170° (175°)     ER at 0°    60°  (65°)    60°  (65°)   ER at 90° ABD  90°  (90°)    90°  (90°)   IR at 90°  ABD   NA  (40°)     NA  (40°)      IR (spine level)   T10     T10    STRENGTH: (* = with pain) Right shoulder   Left shoulder    SCAPTION   5/5    5/5    IR    5/5    5/5   ER    5/5    5/5   BICEPS   5/5    5/5   Deltoid    5/5    5/5     SIGNS:  Painful side       NEER   -    ORASHMIS  neg    CHAVEZ   -    SPEEDS  neg     DROP ARM   -   BELLY PRESS neg   Superior escape none    LIFT-OFF  neg   X-Body ADD    neg    MOVING VALGUS neg        STABILITY TESTING    Right shoulder   Left shoulder    Translation     Anterior  up face     up face    Posterior  up face    up face    Sulcus   < 10mm    < 10 mm     Signs   Apprehension   neg      neg       Relocation   no change     no change      Jerk test  neg     neg    EXTREMITY NEURO-VASCULAR EXAM:    Sensation grossly intact to light touch all dermatomal regions.    DTR 2+ Biceps, Triceps, BR and Negative Nimcos sign   Grossly intact motor function at Elbow, Wrist and Hand   Distal pulses radial and ulnar 2+, brisk cap refill, symmetric.      NECK:  Painless FROM and spinous processes non-tender. Negative Spurlings sign.      OTHER FINDINGS:   scapular dyskinesia    XRAYS:  No new xray today.           ASSESSMENT:   Left shoulder pain, possible:  1. S/P rotator cuff repair        PLAN:      1. Referral to back and spine     2. F/u for shoulder PRN     All questions were answered, patient will contact us for questions or concerns in the interim.

## 2023-09-21 ENCOUNTER — TELEPHONE (OUTPATIENT)
Dept: OPTOMETRY | Facility: CLINIC | Age: 53
End: 2023-09-21
Payer: COMMERCIAL

## 2023-09-21 NOTE — TELEPHONE ENCOUNTER
Called pt informed him we do not deal with insurance as techs and he infomred me he wanted an eye exam without refraction

## 2023-10-18 ENCOUNTER — OFFICE VISIT (OUTPATIENT)
Dept: ORTHOPEDICS | Facility: CLINIC | Age: 53
End: 2023-10-18
Payer: COMMERCIAL

## 2023-10-18 ENCOUNTER — HOSPITAL ENCOUNTER (OUTPATIENT)
Dept: RADIOLOGY | Facility: HOSPITAL | Age: 53
Discharge: HOME | End: 2023-10-18
Attending: ORTHOPAEDIC SURGERY
Payer: COMMERCIAL

## 2023-10-18 VITALS — HEIGHT: 68 IN | WEIGHT: 176.38 LBS | BODY MASS INDEX: 26.73 KG/M2

## 2023-10-18 DIAGNOSIS — M54.12 CERVICAL RADICULOPATHY: Primary | ICD-10-CM

## 2023-10-18 DIAGNOSIS — M50.30 DDD (DEGENERATIVE DISC DISEASE), CERVICAL: ICD-10-CM

## 2023-10-18 DIAGNOSIS — M48.02 SPINAL STENOSIS, CERVICAL REGION: ICD-10-CM

## 2023-10-18 PROCEDURE — 1159F MED LIST DOCD IN RCRD: CPT | Mod: CPTII,S$GLB,, | Performed by: ORTHOPAEDIC SURGERY

## 2023-10-18 PROCEDURE — 99213 PR OFFICE/OUTPT VISIT, EST, LEVL III, 20-29 MIN: ICD-10-PCS | Mod: S$GLB,,, | Performed by: ORTHOPAEDIC SURGERY

## 2023-10-18 PROCEDURE — 72050 XR CERVICAL SPINE AP LAT WITH FLEX EXTEN: ICD-10-PCS | Mod: 26,,, | Performed by: RADIOLOGY

## 2023-10-18 PROCEDURE — 3008F BODY MASS INDEX DOCD: CPT | Mod: CPTII,S$GLB,, | Performed by: ORTHOPAEDIC SURGERY

## 2023-10-18 PROCEDURE — 3044F HG A1C LEVEL LT 7.0%: CPT | Mod: CPTII,S$GLB,, | Performed by: ORTHOPAEDIC SURGERY

## 2023-10-18 PROCEDURE — 1159F PR MEDICATION LIST DOCUMENTED IN MEDICAL RECORD: ICD-10-PCS | Mod: CPTII,S$GLB,, | Performed by: ORTHOPAEDIC SURGERY

## 2023-10-18 PROCEDURE — 72050 X-RAY EXAM NECK SPINE 4/5VWS: CPT | Mod: TC

## 2023-10-18 PROCEDURE — 99213 OFFICE O/P EST LOW 20 MIN: CPT | Mod: S$GLB,,, | Performed by: ORTHOPAEDIC SURGERY

## 2023-10-18 PROCEDURE — 3008F PR BODY MASS INDEX (BMI) DOCUMENTED: ICD-10-PCS | Mod: CPTII,S$GLB,, | Performed by: ORTHOPAEDIC SURGERY

## 2023-10-18 PROCEDURE — 99999 PR PBB SHADOW E&M-EST. PATIENT-LVL III: ICD-10-PCS | Mod: PBBFAC,,, | Performed by: ORTHOPAEDIC SURGERY

## 2023-10-18 PROCEDURE — 72050 X-RAY EXAM NECK SPINE 4/5VWS: CPT | Mod: 26,,, | Performed by: RADIOLOGY

## 2023-10-18 PROCEDURE — 3044F PR MOST RECENT HEMOGLOBIN A1C LEVEL <7.0%: ICD-10-PCS | Mod: CPTII,S$GLB,, | Performed by: ORTHOPAEDIC SURGERY

## 2023-10-18 PROCEDURE — 99999 PR PBB SHADOW E&M-EST. PATIENT-LVL III: CPT | Mod: PBBFAC,,, | Performed by: ORTHOPAEDIC SURGERY

## 2023-10-20 NOTE — PROGRESS NOTES
The patient returns for follow-up.  He has a history of left upper extremity radiculopathy in January of 2022.  This improved after a cervical epidural steroid injection.      In October of 2022 the patient had a left rotator cuff repair, and since then has reported ongoing left periscapular pain, left-sided upper extremity radiculopathy into the radial hand.      The patient has not made significant progress with these symptoms despite conservative management.      On physical examination he does have mild decreased sensation light touch over the palmar aspect of the left middle finger.      Today I was able to review a recent cervical spine radiographs demonstrates loss of disc height at C5-6.      Impression: Left upper extremity radiculopathy.      Plan:  Today we discussed options, I have recommended an MRI of his cervical spine and follow-up for results.

## 2023-11-01 ENCOUNTER — TELEPHONE (OUTPATIENT)
Dept: OPTOMETRY | Facility: CLINIC | Age: 53
End: 2023-11-01
Payer: COMMERCIAL

## 2023-11-20 ENCOUNTER — HOSPITAL ENCOUNTER (OUTPATIENT)
Dept: RADIOLOGY | Facility: HOSPITAL | Age: 53
Discharge: HOME | End: 2023-11-20
Attending: ORTHOPAEDIC SURGERY
Payer: COMMERCIAL

## 2023-11-20 DIAGNOSIS — M48.02 SPINAL STENOSIS, CERVICAL REGION: ICD-10-CM

## 2023-11-20 PROCEDURE — 72141 MRI NECK SPINE W/O DYE: CPT | Mod: TC

## 2023-11-20 PROCEDURE — 72141 MRI CERVICAL SPINE WITHOUT CONTRAST: ICD-10-PCS | Mod: 26,,, | Performed by: RADIOLOGY

## 2023-11-20 PROCEDURE — 72141 MRI NECK SPINE W/O DYE: CPT | Mod: 26,,, | Performed by: RADIOLOGY

## 2023-11-29 ENCOUNTER — OFFICE VISIT (OUTPATIENT)
Dept: ORTHOPEDICS | Facility: CLINIC | Age: 53
End: 2023-11-29
Payer: COMMERCIAL

## 2023-11-29 VITALS — WEIGHT: 180.31 LBS | BODY MASS INDEX: 27.33 KG/M2 | HEIGHT: 68 IN

## 2023-11-29 DIAGNOSIS — M54.12 CERVICAL RADICULOPATHY: Primary | ICD-10-CM

## 2023-11-29 PROCEDURE — 99214 OFFICE O/P EST MOD 30 MIN: CPT | Mod: S$GLB,,, | Performed by: ORTHOPAEDIC SURGERY

## 2023-11-29 PROCEDURE — 3008F BODY MASS INDEX DOCD: CPT | Mod: CPTII,S$GLB,, | Performed by: ORTHOPAEDIC SURGERY

## 2023-11-29 PROCEDURE — 99214 PR OFFICE/OUTPT VISIT, EST, LEVL IV, 30-39 MIN: ICD-10-PCS | Mod: S$GLB,,, | Performed by: ORTHOPAEDIC SURGERY

## 2023-11-29 PROCEDURE — 1159F MED LIST DOCD IN RCRD: CPT | Mod: CPTII,S$GLB,, | Performed by: ORTHOPAEDIC SURGERY

## 2023-11-29 PROCEDURE — 99999 PR PBB SHADOW E&M-EST. PATIENT-LVL III: CPT | Mod: PBBFAC,,, | Performed by: ORTHOPAEDIC SURGERY

## 2023-11-29 PROCEDURE — 3008F PR BODY MASS INDEX (BMI) DOCUMENTED: ICD-10-PCS | Mod: CPTII,S$GLB,, | Performed by: ORTHOPAEDIC SURGERY

## 2023-11-29 PROCEDURE — 99999 PR PBB SHADOW E&M-EST. PATIENT-LVL III: ICD-10-PCS | Mod: PBBFAC,,, | Performed by: ORTHOPAEDIC SURGERY

## 2023-11-29 PROCEDURE — 1159F PR MEDICATION LIST DOCUMENTED IN MEDICAL RECORD: ICD-10-PCS | Mod: CPTII,S$GLB,, | Performed by: ORTHOPAEDIC SURGERY

## 2023-11-29 PROCEDURE — 3044F HG A1C LEVEL LT 7.0%: CPT | Mod: CPTII,S$GLB,, | Performed by: ORTHOPAEDIC SURGERY

## 2023-11-29 PROCEDURE — 3044F PR MOST RECENT HEMOGLOBIN A1C LEVEL <7.0%: ICD-10-PCS | Mod: CPTII,S$GLB,, | Performed by: ORTHOPAEDIC SURGERY

## 2023-12-04 NOTE — PROGRESS NOTES
The patient returns for follow-up.  He has left upper extremity radiculopathy that has become progressively bothersome in the setting of known C5-6 spondylosis.      As last visit I ordered an MRI of the cervical spine, and he returns to review these images.      Today I was able to personally review the recent MRI of the cervical spine demonstrates the following:  There is a left-sided C4-5 foraminal disc herniation.  There is moderate C5-6 central stenosis and severe bilateral foraminal stenosis.  The severe left-sided C6-7 foraminal stenosis.      Impression:  Cervical radiculopathy secondary to multilevel stenosis.      Plan: Today we discussed options, I have recommended a cervical epidural steroid injection.

## 2023-12-19 ENCOUNTER — TELEPHONE (OUTPATIENT)
Dept: PAIN MEDICINE | Facility: CLINIC | Age: 53
End: 2023-12-19
Payer: COMMERCIAL

## 2023-12-19 NOTE — TELEPHONE ENCOUNTER
----- Message from Iram Montoya sent at 12/19/2023  3:06 PM CST -----  Type:  Needs Medical Advice    Who Called: pt    Would the patient rather a call back or a response via [facility]? call  Best Call Back Number: [phone]  Additional Information: pt requesting to have time and instruction sent through mail for appointment

## 2023-12-22 ENCOUNTER — TELEPHONE (OUTPATIENT)
Dept: PAIN MEDICINE | Facility: CLINIC | Age: 53
End: 2023-12-22
Payer: COMMERCIAL

## 2023-12-22 NOTE — TELEPHONE ENCOUNTER
----- Message from Iramsrinath Jordan sent at 12/22/2023 11:53 AM CST -----  Regarding: call back  Name of Who is Calling: morgan [employer]ina mgmt        What is the request in detail: is calling in regards to an authorization on an injection, would like a call back, please advise.    Has been approved, autho # [id number] valid jan 17th, 2024, expires jan 30th 2024  Not a guarantee of payment , subject to pt's active enrollment and benefit limitations, please advise.  CBT CODE: [id number]  Can the clinic reply by [facility]: no          What Number [facility] Back [facility]: [phone]    
(3) occasionally moist

## 2023-12-22 NOTE — TELEPHONE ENCOUNTER
----- Message from Kaia Todd sent at 12/22/2023  8:45 AM CST -----  Regarding: PA approval #  Name of Who is Calling: Shaheen Garcia           What is the request in detail: Radha is calling to give PA approval # for procedure #[id number] Valid dates from 1/17/24-1/30/24.           Can the clinic reply by [facility]: No           What Number to Call Back if not in [facility]: [phone]

## 2024-01-03 ENCOUNTER — TELEPHONE (OUTPATIENT)
Dept: PAIN MEDICINE | Facility: CLINIC | Age: 54
End: 2024-01-03
Payer: COMMERCIAL

## 2024-01-03 NOTE — TELEPHONE ENCOUNTER
Staff gave pt the procedure center number for more information         ----- Message from Irene Farr sent at 1/3/2024  1:59 PM CST -----  Regarding: Procedure  Contact: Pt [phone]  Pt is calling to state he is not on [facility] and wants instructions for procedure mailed to him please call

## 2024-01-04 ENCOUNTER — TELEPHONE (OUTPATIENT)
Dept: INTERNAL MEDICINE | Facility: CLINIC | Age: 54
End: 2024-01-04
Payer: COMMERCIAL

## 2024-01-04 ENCOUNTER — LAB VISIT (OUTPATIENT)
Dept: LAB | Facility: HOSPITAL | Age: 54
End: 2024-01-04
Attending: INTERNAL MEDICINE
Payer: COMMERCIAL

## 2024-01-04 ENCOUNTER — OFFICE VISIT (OUTPATIENT)
Dept: INTERNAL MEDICINE | Facility: CLINIC | Age: 54
End: 2024-01-04
Payer: COMMERCIAL

## 2024-01-04 VITALS
BODY MASS INDEX: 26.71 KG/M2 | HEIGHT: 69 IN | WEIGHT: 180.31 LBS | DIASTOLIC BLOOD PRESSURE: 84 MMHG | HEART RATE: 53 BPM | OXYGEN SATURATION: 97 % | SYSTOLIC BLOOD PRESSURE: 114 MMHG

## 2024-01-04 DIAGNOSIS — D17.21 LIPOMA OF RIGHT UPPER EXTREMITY: ICD-10-CM

## 2024-01-04 DIAGNOSIS — I10 HTN (HYPERTENSION), BENIGN: ICD-10-CM

## 2024-01-04 DIAGNOSIS — G89.29 CHRONIC HEEL PAIN, LEFT: Primary | ICD-10-CM

## 2024-01-04 DIAGNOSIS — Z00.00 ROUTINE PHYSICAL EXAMINATION: ICD-10-CM

## 2024-01-04 DIAGNOSIS — L84 CORN OR CALLUS: ICD-10-CM

## 2024-01-04 DIAGNOSIS — M48.02 CERVICAL SPINAL STENOSIS: ICD-10-CM

## 2024-01-04 DIAGNOSIS — M79.672 CHRONIC HEEL PAIN, LEFT: Primary | ICD-10-CM

## 2024-01-04 DIAGNOSIS — E03.4 HYPOTHYROIDISM DUE TO ACQUIRED ATROPHY OF THYROID: ICD-10-CM

## 2024-01-04 LAB
ALBUMIN SERPL BCP-MCNC: 4.2 G/DL (ref 3.5–5.2)
ALP SERPL-CCNC: 74 U/L (ref 55–135)
ALT SERPL W/O P-5'-P-CCNC: 16 U/L (ref 10–44)
ANION GAP SERPL CALC-SCNC: 13 MMOL/L (ref 8–16)
AST SERPL-CCNC: 21 U/L (ref 10–40)
BASOPHILS # BLD AUTO: 0.02 K/UL (ref 0–0.2)
BASOPHILS NFR BLD: 0.3 % (ref 0–1.9)
BILIRUB SERPL-MCNC: 0.9 MG/DL (ref 0.1–1)
BUN SERPL-MCNC: 12 MG/DL (ref 6–20)
CALCIUM SERPL-MCNC: 9.4 MG/DL (ref 8.7–10.5)
CHLORIDE SERPL-SCNC: 109 MMOL/L (ref 95–110)
CHOLEST SERPL-MCNC: 236 MG/DL (ref 120–199)
CHOLEST/HDLC SERPL: 4.1 {RATIO} (ref 2–5)
CO2 SERPL-SCNC: 19 MMOL/L (ref 23–29)
COMPLEXED PSA SERPL-MCNC: 0.54 NG/ML (ref 0–4)
CREAT SERPL-MCNC: 0.9 MG/DL (ref 0.5–1.4)
DIFFERENTIAL METHOD BLD: ABNORMAL
EOSINOPHIL # BLD AUTO: 0.1 K/UL (ref 0–0.5)
EOSINOPHIL NFR BLD: 1.6 % (ref 0–8)
ERYTHROCYTE [DISTWIDTH] IN BLOOD BY AUTOMATED COUNT: 12.6 % (ref 11.5–14.5)
EST. GFR  (NO RACE VARIABLE): >60 ML/MIN/1.73 M^2
ESTIMATED AVG GLUCOSE: 91 MG/DL (ref 68–131)
GLUCOSE SERPL-MCNC: 106 MG/DL (ref 70–110)
HBA1C MFR BLD: 4.8 % (ref 4–5.6)
HCT VFR BLD AUTO: 46.9 % (ref 40–54)
HDLC SERPL-MCNC: 57 MG/DL (ref 40–75)
HDLC SERPL: 24.2 % (ref 20–50)
HGB BLD-MCNC: 16.3 G/DL (ref 14–18)
IMM GRANULOCYTES # BLD AUTO: 0.01 K/UL (ref 0–0.04)
IMM GRANULOCYTES NFR BLD AUTO: 0.2 % (ref 0–0.5)
LDLC SERPL CALC-MCNC: 166.8 MG/DL (ref 63–159)
LYMPHOCYTES # BLD AUTO: 1.7 K/UL (ref 1–4.8)
LYMPHOCYTES NFR BLD: 27 % (ref 18–48)
MCH RBC QN AUTO: 31.8 PG (ref 27–31)
MCHC RBC AUTO-ENTMCNC: 34.8 G/DL (ref 32–36)
MCV RBC AUTO: 92 FL (ref 82–98)
MONOCYTES # BLD AUTO: 0.4 K/UL (ref 0.3–1)
MONOCYTES NFR BLD: 5.9 % (ref 4–15)
NEUTROPHILS # BLD AUTO: 4 K/UL (ref 1.8–7.7)
NEUTROPHILS NFR BLD: 65 % (ref 38–73)
NONHDLC SERPL-MCNC: 179 MG/DL
NRBC BLD-RTO: 0 /100 WBC
PLATELET # BLD AUTO: 256 K/UL (ref 150–450)
PMV BLD AUTO: 10.2 FL (ref 9.2–12.9)
POTASSIUM SERPL-SCNC: 4.4 MMOL/L (ref 3.5–5.1)
PROT SERPL-MCNC: 7.2 G/DL (ref 6–8.4)
RBC # BLD AUTO: 5.12 M/UL (ref 4.6–6.2)
SODIUM SERPL-SCNC: 141 MMOL/L (ref 136–145)
TRIGL SERPL-MCNC: 61 MG/DL (ref 30–150)
TSH SERPL DL<=0.005 MIU/L-ACNC: 2.49 UIU/ML (ref 0.4–4)
WBC # BLD AUTO: 6.14 K/UL (ref 3.9–12.7)

## 2024-01-04 PROCEDURE — 84443 ASSAY THYROID STIM HORMONE: CPT | Performed by: INTERNAL MEDICINE

## 2024-01-04 PROCEDURE — 99999 PR PBB SHADOW E&M-EST. PATIENT-LVL IV: CPT | Mod: PBBFAC,,, | Performed by: INTERNAL MEDICINE

## 2024-01-04 PROCEDURE — 80053 COMPREHEN METABOLIC PANEL: CPT | Performed by: INTERNAL MEDICINE

## 2024-01-04 PROCEDURE — 85025 COMPLETE CBC W/AUTO DIFF WBC: CPT | Performed by: INTERNAL MEDICINE

## 2024-01-04 PROCEDURE — 1159F MED LIST DOCD IN RCRD: CPT | Mod: CPTII,S$GLB,, | Performed by: INTERNAL MEDICINE

## 2024-01-04 PROCEDURE — 83036 HEMOGLOBIN GLYCOSYLATED A1C: CPT | Performed by: INTERNAL MEDICINE

## 2024-01-04 PROCEDURE — 80061 LIPID PANEL: CPT | Performed by: INTERNAL MEDICINE

## 2024-01-04 PROCEDURE — 1160F RVW MEDS BY RX/DR IN RCRD: CPT | Mod: CPTII,S$GLB,, | Performed by: INTERNAL MEDICINE

## 2024-01-04 PROCEDURE — 3079F DIAST BP 80-89 MM HG: CPT | Mod: CPTII,S$GLB,, | Performed by: INTERNAL MEDICINE

## 2024-01-04 PROCEDURE — 36415 COLL VENOUS BLD VENIPUNCTURE: CPT | Performed by: INTERNAL MEDICINE

## 2024-01-04 PROCEDURE — 99396 PREV VISIT EST AGE 40-64: CPT | Mod: S$GLB,,, | Performed by: INTERNAL MEDICINE

## 2024-01-04 PROCEDURE — 84153 ASSAY OF PSA TOTAL: CPT | Performed by: INTERNAL MEDICINE

## 2024-01-04 PROCEDURE — 3008F BODY MASS INDEX DOCD: CPT | Mod: CPTII,S$GLB,, | Performed by: INTERNAL MEDICINE

## 2024-01-04 PROCEDURE — 3074F SYST BP LT 130 MM HG: CPT | Mod: CPTII,S$GLB,, | Performed by: INTERNAL MEDICINE

## 2024-01-04 RX ORDER — FLUTICASONE PROPIONATE 50 MCG
1 SPRAY, SUSPENSION (ML) NASAL DAILY
COMMUNITY

## 2024-01-04 RX ORDER — CETIRIZINE HYDROCHLORIDE 10 MG/1
10 TABLET ORAL DAILY
COMMUNITY

## 2024-01-04 NOTE — PROGRESS NOTES
Subjective:       Patient ID: Medardo Vizcaino III is a 53 y.o. male.    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  He has a few issues to discuss.  He is about to get injections in the neck for spinal stenosis.  He has some numbness and tingling into the left hand.  He sometimes gets some mild weakness and wanted to know if that was related to arthritis or possibly the nerve pinch.  We discussed that at length and he will discuss it with his neurosurgeon.    He is also having left heel pain and corn on the left 5th toe.  He has used inserts over the years with some relief but might like to see Podiatry again.  We will get an x-ray.    He wanted to discuss vaccines.  He has not quite eligible for RSV but would like to get shingles and I suggested a tetanus update.  He has had a history of HPV that was treated with liquid nitrogen many years ago.  No active lesions but he wanted to know if he could go to Urology for any issues now or in the future which certainly would be reasonable.  He has a small recurrent soft tissue lump on the right forearm.  He has had lipomas removed from this area in the past and we reviewed the prior pathology.  This is small and nontender so he is not inclined to deal with it now but will monitor the size.  We will get some labs.  He is up-to-date with refills.      Review of Systems   Constitutional:  Negative for fatigue and fever.   Respiratory:  Negative for cough.    Gastrointestinal:  Negative for abdominal pain.   Musculoskeletal:  Positive for arthralgias (heel pain), leg pain, neck pain and neck stiffness.   Integumentary:         Corner callus left 5th toe           Past Medical History:   Diagnosis Date    Acromioclavicular joint arthritis 3/20/2014    Hypothyroidism      Past Surgical History:   Procedure Laterality Date    ARTHROSCOPIC DEBRIDEMENT OF SHOULDER Left 10/3/2022    Procedure: DEBRIDEMENT,  SHOULDER, ARTHROSCOPIC-LABRUM;  Surgeon: Jef Lawrence MD;  Location: [facility] OR;  Service: Orthopedics;  Laterality: Left;    ARTHROSCOPIC REPAIR OF ROTATOR CUFF OF SHOULDER Left 10/3/2022    Procedure: REPAIR, ROTATOR CUFF, ARTHROSCOPIC;  Surgeon: Jef Lawrence MD;  Location: [facility] OR;  Service: Orthopedics;  Laterality: Left;  Regional with catheter (interscalene)    ARTHROSCOPY OF SHOULDER WITH DECOMPRESSION OF SUBACROMIAL SPACE Left 10/3/2022    Procedure: ARTHROSCOPY, SHOULDER, WITH SUBACROMIAL SPACE DECOMPRESSION;  Surgeon: Jef Lawrence MD;  Location: [facility] OR;  Service: Orthopedics;  Laterality: Left;    COLONOSCOPY N/A 4/21/2023    Procedure: COLONOSCOPY;  Surgeon: Jeff Hough MD;  [facility]  Service: Endoscopy;  Laterality: N/A;  pt wants prep inst mailed-RB  no answer for precall/mleone lpn    EPIDURAL STEROID INJECTION N/A 2/9/2022    Procedure: Injection, Steroid, Epidural C7/T1  DIRECT REFERRAL;  Surgeon: Deon Strauss MD;  Location: [facility] PAIN MGT;  Service: Pain Management;  Laterality: N/A;    FIXATION OF TENDON Left 10/3/2022    Procedure: FIXATION, TENDON;  Surgeon: Jef Lawrence MD;  Location: [facility];  Service: Orthopedics;  Laterality: Left;    NASAL SEPTUM SURGERY      SHOULDER ARTHROSCOPY      SHOULDER SURGERY      TONSILLECTOMY      TUMOR EXCISION      arm; lipoma      Patient Active Problem List   Diagnosis    Hypothyroidism due to acquired atrophy of thyroid    Chronic pain of both shoulders    Rotator cuff tear    SLAP (superior labrum from anterior to posterior) tear    Biceps tendonitis    Acromioclavicular joint arthritis    S/P R shoulder surgery, arthroscopic RCR, SAD, DCE, open biceps tenodesis    Lateral epicondylitis of right elbow    Pain in both hands    Chronic pain of both knees    Marijuana user    Muscle tone increased    Primary osteoarthritis involving multiple joints    Other idiopathic scoliosis, thoracolumbar region     Left leg paresthesias    Decreased strength of trunk and back    Family history of arrhythmia    HTN (hypertension), benign        Objective:      Physical Exam  Constitutional:       General: He is not in acute distress.     Appearance: He is well-developed.   HENT:      Head: Normocephalic and atraumatic.      Right Ear: Tympanic membrane, ear canal and external ear normal.      Left Ear: Tympanic membrane, ear canal and external ear normal.      Nose: Nose normal. No rhinorrhea.      Mouth/Throat:      Pharynx: No oropharyngeal exudate or posterior oropharyngeal erythema.   Eyes:      Conjunctiva/sclera: Conjunctivae normal.      Pupils: Pupils are equal, round, and reactive to light.   Neck:      Thyroid: No thyromegaly.      Vascular: No JVD.      Comments: No supraclavicular nodes palpated bilaterally.   Cardiovascular:      Rate and Rhythm: Normal rate and regular rhythm.      Pulses: Normal pulses.      Heart sounds: Normal heart sounds. No murmur heard.  Pulmonary:      Effort: Pulmonary effort is normal.      Breath sounds: Normal breath sounds. No wheezing or rales.   Abdominal:      General: Bowel sounds are normal. There is no distension.      Palpations: Abdomen is soft. There is no mass.      Tenderness: There is no abdominal tenderness.   Genitourinary:     Prostate: Normal. Not enlarged and not tender.      Rectum: Normal. Guaiac result negative. No tenderness.   Musculoskeletal:         General: No tenderness. Normal range of motion.      Cervical back: Normal range of motion. Rigidity present.      Right lower leg: No edema.      Left lower leg: No edema.   Lymphadenopathy:      Cervical: No cervical adenopathy.      Upper Body:      Right upper body: No supraclavicular adenopathy.      Left upper body: No supraclavicular adenopathy.   Skin:     General: Skin is warm and dry.      Coloration: Skin is not jaundiced.      Findings: No rash.      Comments: Small soft tissue lump right forearm,  approximately 0.5 cm round.  Mobile, nontender.   Neurological:      General: No focal deficit present.      Mental Status: He is alert and oriented to person, place, and time.      Cranial Nerves: No cranial nerve deficit.      Sensory: Sensory deficit (left 1st 2-3 fingers) present.      Coordination: Coordination normal.      Deep Tendon Reflexes: Reflexes are normal and symmetric.      Reflex Scores:       Bicep reflexes are 2+ on the right side and 2+ on the left side.       Patellar reflexes are 2+ on the right side and 2+ on the left side.  Psychiatric:         Mood and Affect: Mood normal. Mood is not depressed.         Behavior: Behavior normal.         Thought Content: Thought content normal.         Assessment:       Problem List Items Addressed This Visit          Cardiac/Vascular    HTN (hypertension), benign       Endocrine    Hypothyroidism due to acquired atrophy of thyroid     Other Visit Diagnoses       Chronic heel pain, left    -  Primary    Relevant Orders    X-Ray Foot Complete 3 view Left    Ambulatory referral/consult to Podiatry    [place] or callus        Relevant Orders    X-Ray Foot Complete 3 view Left    Ambulatory referral/consult to Podiatry    Cervical spinal stenosis        Lipoma of right upper extremity        Routine physical examination        Relevant Orders    Lipid Panel    PSA, Screening    TSH    Hemoglobin A1C    CBC Auto Differential    Comprehensive Metabolic Panel            Plan:         Medardo was seen today for annual exam.    Diagnoses and all orders for this visit:    Chronic heel pain, left  -     X-Ray Foot Complete 3 view Left; Future  -     Ambulatory referral/consult to Podiatry; Future    Corn or callus  -     X-Ray Foot Complete 3 view Left; Future  -     Ambulatory referral/consult to Podiatry; Future    Cervical spinal stenosis    HTN (hypertension), benign    Hypothyroidism due to acquired atrophy of thyroid    Lipoma of right upper extremity    Routine  "physical examination  -     Lipid Panel; Future  -     PSA, Screening; Future  -     TSH; Future  -     Hemoglobin A1C; Future  -     CBC Auto Differential; Future  -     Comprehensive Metabolic Panel; Future           Review labs.  Follow-up annually, sooner as needed.  Refill prescriptions as needed.    Tdap and Shingles Vaccines        Portions of this note may have been created with voice recognition software. Occasional "wrong-word" or "sound-a-like" substitutions may have occurred due to the inherent limitations of voice recognition software. Please, read the note carefully and recognize, using context, where substitutions have occurred.  "

## 2024-01-04 NOTE — TELEPHONE ENCOUNTER
Dear procedure center  the pt attach was trying to get in contact with you guys. He has a procedure soon and he will like for you to mail he the instructions, directions,date, and time of his procedure.Pt is not on [facility] portal so please mail him his information.       Thanks   Ju FOSTER

## 2024-01-04 NOTE — TELEPHONE ENCOUNTER
Good morning, pt is here seeing Dr. Chen today. He is concerned about the pre surgery/injection instructions on what he has to do prior to receiving injection. He is requesting it to be mailed to him. Can someone on your staff please mail this to the pt AND give him a call? Thanks.    Roxana VIRK

## 2024-01-05 ENCOUNTER — TELEPHONE (OUTPATIENT)
Dept: INTERNAL MEDICINE | Facility: CLINIC | Age: 54
End: 2024-01-05
Payer: COMMERCIAL

## 2024-01-05 NOTE — TELEPHONE ENCOUNTER
Please let the patient know that his labs were all very stable except for cholesterol which jumped nearly 40 points up to 236.  That is the highest it has been in a while.  Not sure if his diet changed over the holidays, or if he has been less physically active because of his he will and toe issue?  Hopefully with some improvements this can get better.  Let me know of any thoughts or questions that he may have.  Otherwise his chemistry, prostate, thyroid all looked okay.

## 2024-01-05 NOTE — TELEPHONE ENCOUNTER
----- Message from Dai S Otoniel sent at 1/5/2024  3:46 PM CST -----  Contact: Home  [phone]  Patient is returning a phone call.  Who left a message for the patient: Mary Brothers LPN  Does patient know what this is regarding:  A message from Mary Brothers LPN  Would you like a call back, or a response through your [facility] portal?:   Call  Comments: Patient said that he would like for you to mail him his latest test results. Patient said that he does not need a call back.

## 2024-01-05 NOTE — TELEPHONE ENCOUNTER
Spoke w/ pt verbalized understanding, has no questions at this time. Requested lab results be mailed to him address on chart

## 2024-01-10 ENCOUNTER — OFFICE VISIT (OUTPATIENT)
Dept: OPTOMETRY | Facility: CLINIC | Age: 54
End: 2024-01-10
Payer: COMMERCIAL

## 2024-01-10 DIAGNOSIS — H52.223 REGULAR ASTIGMATISM OF BOTH EYES: ICD-10-CM

## 2024-01-10 DIAGNOSIS — H52.4 PRESBYOPIA OF BOTH EYES: ICD-10-CM

## 2024-01-10 DIAGNOSIS — Z13.5 SCREENING FOR EYE CONDITION: ICD-10-CM

## 2024-01-10 DIAGNOSIS — Z01.00 EXAMINATION OF EYES AND VISION: Primary | ICD-10-CM

## 2024-01-10 PROCEDURE — 92015 DETERMINE REFRACTIVE STATE: CPT | Mod: S$GLB,,, | Performed by: OPTOMETRIST

## 2024-01-10 PROCEDURE — 92004 COMPRE OPH EXAM NEW PT 1/>: CPT | Mod: S$GLB,,, | Performed by: OPTOMETRIST

## 2024-01-10 PROCEDURE — 99999 PR PBB SHADOW E&M-EST. PATIENT-LVL III: CPT | Mod: PBBFAC,,, | Performed by: OPTOMETRIST

## 2024-01-10 NOTE — PROGRESS NOTES
"[facility]     eye examination             Comments: Overdue general eye examination.  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  Happy with unaided distance VA and unaided near VA. Feels no need for   glasses.          Comments    Patient's age: 53 y.o. WM  Occupation: [employer]  8+ hours of computer work a day  Approximate date of last eye examination:  08/18/2016  Name of last eye doctor seen:  Dr Sanchez  City/State:  [facility]  Wears glasses? No     If yes, wears  Rare use  Present glasses are bifocal / S V Distance / S V Reading: n/a  Approximate age of present glasses:N/A  Got new glasses following last exam, or subsequently?:  N/A   Any problem with VA with glasses?  N/A  Wears CLs?:  No  Headaches? No  Eye pain/discomfort?  No                                                                                       Flashes?  No  Floaters?  Yes, Rarely  Diplopia/Double vision?  No  Patient's Ocular History:         Any eye surgeries? No         Any eye injury?  No         Any treatment for eye disease?  No  Family history of eye disease?  No  Significant patient medical history:         1. Diabetes?  No   2. HBP?  No              3. Other (describe):     ! OTC eyedrops currently using: No   ! Prescription eye meds currently using:  No   ! Any history of allergy/adverse reaction to any eye meds used   previously?  No    ! Any history of allergy/adverse reaction to eyedrops used during prior   eye exam(s)? No    ! Any history of allergy/adverse reaction to Novacaine or similar meds?   No   ! Any history of allergy/adverse reaction to Epinephrine or similar meds?   No    ! Patient okay with use of anesthetic eyedrops to check eye pressure?    Yes        ! Patient okay with use of eyedrops to dilate pupils today?  Yes   !  Allergies/Medications/Medical History/Family History reviewed today?    Yes        PD =   66/62  Desired reading distance =  16"                                                                         Last edited by " Rico Sanchez, OD on 1/10/2024  1:20 PM.            Assessment /Plan     For exam results, see Encounter Report.  1. Examination of eyes and vision        2. Regular astigmatism of both eyes        3. Presbyopia of both eyes        4. Screening for eye condition                   Good ocular health in both eyes.    Astigmatic refractive error in each eye, with good correctable VA in each eye.  Presbyopia consistent with age.  New spectacle lens Rx issued for use as needed     No longer wearing contact lenses in either eye    Recheck in two  years - or prior, if any problems or bothersome changes in vision noted in the interim

## 2024-01-10 NOTE — PATIENT INSTRUCTIONS
Good ocular health in both eyes.    Astigmatic refractive error in each eye, with good correctable VA in each eye.  Presbyopia consistent with age.  New spectacle lens Rx issued for use as needed     No longer wearing contact lenses in either eye    Recheck in two  years - or prior, if any problems or bothersome changes in vision noted in the interim

## 2024-01-17 ENCOUNTER — HOSPITAL ENCOUNTER (OUTPATIENT)
Facility: OTHER | Age: 54
Discharge: HOME | End: 2024-01-17
Attending: ANESTHESIOLOGY | Admitting: ANESTHESIOLOGY
Payer: COMMERCIAL

## 2024-01-17 VITALS
WEIGHT: 170 LBS | HEIGHT: 68 IN | SYSTOLIC BLOOD PRESSURE: 111 MMHG | RESPIRATION RATE: 16 BRPM | TEMPERATURE: 99 F | HEART RATE: 48 BPM | OXYGEN SATURATION: 97 % | DIASTOLIC BLOOD PRESSURE: 83 MMHG | BODY MASS INDEX: 25.76 KG/M2

## 2024-01-17 DIAGNOSIS — M51.36 DDD (DEGENERATIVE DISC DISEASE), LUMBAR: ICD-10-CM

## 2024-01-17 DIAGNOSIS — M54.12 CERVICAL RADICULOPATHY: ICD-10-CM

## 2024-01-17 DIAGNOSIS — M50.30 DDD (DEGENERATIVE DISC DISEASE), CERVICAL: Primary | ICD-10-CM

## 2024-01-17 PROCEDURE — 25500020 PHARM REV CODE 255: Performed by: ANESTHESIOLOGY

## 2024-01-17 PROCEDURE — 25000003 PHARM REV CODE 250: Performed by: ANESTHESIOLOGY

## 2024-01-17 PROCEDURE — 62321 NJX INTERLAMINAR CRV/THRC: CPT | Performed by: ANESTHESIOLOGY

## 2024-01-17 PROCEDURE — 62321 NJX INTERLAMINAR CRV/THRC: CPT | Mod: ,,, | Performed by: ANESTHESIOLOGY

## 2024-01-17 PROCEDURE — 63600175 PHARM REV CODE 636 W HCPCS: Performed by: ANESTHESIOLOGY

## 2024-01-17 RX ORDER — SODIUM CHLORIDE 9 MG/ML
INJECTION, SOLUTION INTRAVENOUS CONTINUOUS
Status: DISCONTINUED | OUTPATIENT
Start: 2024-01-17 | End: 2024-01-17 | Stop reason: HOSPADM

## 2024-01-17 RX ORDER — DEXAMETHASONE SODIUM PHOSPHATE 10 MG/ML
INJECTION INTRAMUSCULAR; INTRAVENOUS
Status: DISCONTINUED | OUTPATIENT
Start: 2024-01-17 | End: 2024-01-17 | Stop reason: HOSPADM

## 2024-01-17 RX ORDER — LIDOCAINE HYDROCHLORIDE 20 MG/ML
INJECTION, SOLUTION INFILTRATION; PERINEURAL
Status: DISCONTINUED | OUTPATIENT
Start: 2024-01-17 | End: 2024-01-17 | Stop reason: HOSPADM

## 2024-01-17 RX ORDER — ALPRAZOLAM 0.5 MG/1
1 TABLET, ORALLY DISINTEGRATING ORAL
Status: DISCONTINUED | OUTPATIENT
Start: 2024-01-17 | End: 2024-01-17 | Stop reason: HOSPADM

## 2024-01-17 RX ORDER — LIDOCAINE HYDROCHLORIDE 10 MG/ML
INJECTION, SOLUTION EPIDURAL; INFILTRATION; INTRACAUDAL; PERINEURAL
Status: DISCONTINUED | OUTPATIENT
Start: 2024-01-17 | End: 2024-01-17 | Stop reason: HOSPADM

## 2024-01-17 RX ADMIN — ALPRAZOLAM 1 MG: 0.5 TABLET, ORALLY DISINTEGRATING ORAL at 08:01

## 2024-01-17 NOTE — DISCHARGE SUMMARY
Discharge Note  Short Stay      SUMMARY     Admit Date: 1/17/2024    Attending Physician: Deon Strauss MD    Discharge Physician: Ravi Wolf MD      Discharge Date: 1/17/2024 9:36 AM    Procedure(s) (LRB):  CERVICAL MARCELLA C7-T1 DIRECT REFERRAL (N/A)    Final Diagnosis: Cervical radiculopathy [M54.12]    Disposition: Home or self care    Patient Instructions:   Current Discharge Medication List        CONTINUE these medications which have NOT CHANGED    Details   cetirizine (ZYRTEC) 10 MG tablet Take 10 mg by mouth once daily.      diphth,pertus,acell,,tetanus (BOOSTRIX) 2.5-8-5 Lf-mcg-Lf/0.5mL Susp Inject into the muscle.  Qty: 0.5 mL, Refills: 0      fluticasone propionate (FLONASE) 50 mcg/actuation nasal spray 1 spray by Each Nostril route once daily.      levothyroxine (SYNTHROID) 75 MCG tablet Take 1 tablet by mouth once daily.  Qty: 90 tablet, Refills: 2      omega-3 fatty acids/fish oil (FISH OIL-OMEGA-3 FATTY ACIDS) 300-1,000 mg capsule Take by mouth once daily.      sodium,potassium,mag sulfates (SUPREP BOWEL PREP KIT) 17.5-3.13-1.6 gram SolR Follow instructions given by Endoscopy scheduling nurse  Qty: 1 kit, Refills: 0    Comments: May substitute with generic suprep  Associated Diagnoses: Colon cancer screening      varicella-zoster gE-AS01B, PF, (SHINGRIX) 50 mcg/0.5 mL injection Inject into the muscle.  Qty: 1 each, Refills: 0      vitamin B complex (B COMPLEX-VITAMIN B12 ORAL) Take by mouth.                 Discharge Diagnosis: Cervical radiculopathy [M54.12]  Condition on Discharge: Stable with no complications to procedure   Diet on Discharge: Same as before.  Activity: as per instruction sheet.  Discharge to: Home with a responsible adult.  Follow up: 2-4 weeks       Please call my office or pager at [phone] if experienced any weakness or loss of sensation, fever > 101.5, pain uncontrolled with oral medications, persistent nausea/vomiting/or diarrhea, redness or drainage from the  incisions, or any other worrisome concerns. If physician on call was not reached or could not communicate with our office for any reason please go to the nearest emergency department      Ravi Wolf M.D.  PGY-5  Interventional Pain Management Fellow  [facility]  Pager: [phone]    01/17/2024

## 2024-01-17 NOTE — DISCHARGE INSTRUCTIONS

## 2024-01-17 NOTE — H&P
HPI  Patient presenting for Procedure(s) (LRB):  CERVICAL MARCELLA DIRECT REFERRAL (N/A)     Patient on Anti-coagulation No    No health changes since previous encounter    Past Medical History:   Diagnosis Date    Acromioclavicular joint arthritis 3/20/2014    Hypothyroidism      Past Surgical History:   Procedure Laterality Date    ARTHROSCOPIC DEBRIDEMENT OF SHOULDER Left 10/3/2022    Procedure: DEBRIDEMENT, SHOULDER, ARTHROSCOPIC-LABRUM;  Surgeon: Jef Lawrence MD;  Location: [facility];  Service: Orthopedics;  Laterality: Left;    ARTHROSCOPIC REPAIR OF ROTATOR CUFF OF SHOULDER Left 10/3/2022    Procedure: REPAIR, ROTATOR CUFF, ARTHROSCOPIC;  Surgeon: Jef Lawrence MD;  Location: [facility] OR;  Service: Orthopedics;  Laterality: Left;  Regional with catheter (interscalene)    ARTHROSCOPY OF SHOULDER WITH DECOMPRESSION OF SUBACROMIAL SPACE Left 10/3/2022    Procedure: ARTHROSCOPY, SHOULDER, WITH SUBACROMIAL SPACE DECOMPRESSION;  Surgeon: Jef Lawrence MD;  Location: [facility] OR;  Service: Orthopedics;  Laterality: Left;    COLONOSCOPY N/A 4/21/2023    Procedure: COLONOSCOPY;  Surgeon: Jeff Hough MD;  Location: [facility] ENDO (4TH FLR);  Service: Endoscopy;  Laterality: N/A;  pt wants prep inst mailed-RB  no answer for precall/mleone lpn    EPIDURAL STEROID INJECTION N/A 2/9/2022    Procedure: Injection, Steroid, Epidural C7/T1  DIRECT REFERRAL;  Surgeon: Deon Strauss MD;  Location: [facility] PAIN MGT;  Service: Pain Management;  Laterality: N/A;    FIXATION OF TENDON Left 10/3/2022    Procedure: FIXATION, TENDON;  Surgeon: Jef Lawrence MD;  Location: [facility] OR;  Service: Orthopedics;  Laterality: Left;    NASAL SEPTUM SURGERY      SHOULDER ARTHROSCOPY      SHOULDER SURGERY      TONSILLECTOMY      TUMOR EXCISION      arm; lipoma     Review of patient's allergies indicates:   Allergen Reactions    Sulfa (sulfonamide antibiotics) Hives      Current Facility-Administered Medications   Medication    0.9%   NaCl infusion       PMHx, PSHx, Allergies, Medications reviewed in epic    ROS negative except pain complaints in HPI    OBJECTIVE:    There were no vitals taken for this visit.    PHYSICAL EXAMINATION:    GENERAL: Well appearing, in no acute distress, alert and oriented x3.  PSYCH:  Mood and affect appropriate.  SKIN: Skin color, texture, turgor normal, no rashes or lesions which will impact the procedure.  CV: RRR with palpation of the radial artery.  PULM: No evidence of respiratory difficulty, symmetric chest rise. Clear to auscultation.  NEURO: Cranial nerves grossly intact.    Plan:    Proceed with procedure as planned Procedure(s) (LRB):  CERVICAL MARCELLA DIRECT REFERRAL (N/A)    Ravi Wolf M.D.  PGY-5  Interventional Pain Management Fellow  [facility]  Pager: [phone]    01/17/2024

## 2024-01-17 NOTE — OP NOTE
Cervical Interlaminar Epidural Steroid Injection under Fluoroscopic Guidance    The procedure, risks, benefits, and options were discussed with the patient. There are no contraindications to the procedure. The patent expressed understanding and agreed to the procedure. Informed written consent was obtained prior to the start of the procedure and can be found in the patient's chart.     PATIENT NAME: Medardo Vizcaino III   MRN: [id number]     DATE OF PROCEDURE: 01/17/2024    PROCEDURE: Cervical Interlaminar Epidural Steroid Injection C7/T1 under Fluoroscopic Guidance    PRE-OP DIAGNOSIS: Cervical radiculopathy [M54.12] Cervical radiculopathy [M54.12]    POST-OP DIAGNOSIS: Same    PHYSICIAN: Deon Strauss MD     ASSISTANTS: Ravi Wolf MD    MEDICATIONS INJECTED: Preservative-free Decadron 10mg with 2cc of Lidocaine 1% MPF and preservative free normal saline    LOCAL ANESTHETIC INJECTED: Xylocaine 2%     SEDATION: None    ESTIMATED BLOOD LOSS: None    COMPLICATIONS: None    TECHNIQUE: Time-out was performed to identify the patient and procedure to be performed. With the patient laying in a prone position, the surgical area was prepped and draped in the usual sterile fashion using ChloraPrep and a fenestrated drape. The level was determined under fluoroscopy guidance. Skin anesthesia was achieved by injecting Lidocaine 2% over the injection site.  The interlaminar space was then approached with a 18 gauge, 3.5 inch Tuohy needle that was introduced under fluoroscopic guidance with AP, lateral and/or contralateral oblique imaging. Once the Ligamentum flavum was encountered loss of resistance to saline was used to enter the epidural space. With positive loss of resistance and negative aspiration for CSF or Blood, contrast dye  Omnipaque (300mg/mL) was injected to confirm placement and there was no vascular runoff. Then 5 mL of the medication mixture listed above was then injected slowly. Displacement of the radio  opaque contrast after injection of the medication confirmed that the medication went into the area of the epidural space. The needles were removed, and bleeding was nil. A sterile dressing was applied. No specimens collected. The patient tolerated the procedure well.       The patient was monitored after the procedure in the recovery area. They were given post-procedure and discharge instructions to follow at home. The patient was discharged in a stable condition.    Ravi Wolf MD    I reviewed and edited the fellow's note. I conducted my own interview and physical examination. I agree with the findings. I was present and supervising all critical portions of the procedure.    Deon Strauss MD

## 2024-02-01 RX ORDER — LEVOTHYROXINE SODIUM 75 UG/1
TABLET ORAL
Qty: 90 TABLET | Refills: 3 | Status: SHIPPED | OUTPATIENT
Start: 2024-02-01

## 2024-02-01 NOTE — TELEPHONE ENCOUNTER
No care due was identified.  [facility] Embedded Care Due Messages. Reference number: [id number].   2/01/2024 12:03:31 PM CST

## 2024-02-01 NOTE — TELEPHONE ENCOUNTER
Refill Decision Note   Medardo Vizcaino  is requesting a refill authorization.  Brief Assessment and Rationale for Refill:  Approve     Medication Therapy Plan:         Comments:     Note composed:2:24 PM 02/01/2024

## 2024-02-12 ENCOUNTER — HOSPITAL ENCOUNTER (OUTPATIENT)
Dept: RADIOLOGY | Facility: HOSPITAL | Age: 54
Discharge: HOME | End: 2024-02-12
Attending: INTERNAL MEDICINE
Payer: COMMERCIAL

## 2024-02-12 DIAGNOSIS — L84 CORN OR CALLUS: ICD-10-CM

## 2024-02-12 DIAGNOSIS — G89.29 CHRONIC HEEL PAIN, LEFT: ICD-10-CM

## 2024-02-12 DIAGNOSIS — M79.672 CHRONIC HEEL PAIN, LEFT: ICD-10-CM

## 2024-02-12 PROCEDURE — 73630 X-RAY EXAM OF FOOT: CPT | Mod: 26,LT,, | Performed by: RADIOLOGY

## 2024-02-12 PROCEDURE — 73630 X-RAY EXAM OF FOOT: CPT | Mod: TC,LT

## 2024-02-16 ENCOUNTER — OFFICE VISIT (OUTPATIENT)
Dept: PODIATRY | Facility: CLINIC | Age: 54
End: 2024-02-16
Payer: COMMERCIAL

## 2024-02-16 VITALS
SYSTOLIC BLOOD PRESSURE: 121 MMHG | WEIGHT: 181 LBS | RESPIRATION RATE: 18 BRPM | BODY MASS INDEX: 27.43 KG/M2 | DIASTOLIC BLOOD PRESSURE: 76 MMHG | TEMPERATURE: 99 F | HEART RATE: 63 BPM | OXYGEN SATURATION: 96 % | HEIGHT: 68 IN

## 2024-02-16 DIAGNOSIS — M71.379 SYNOVIAL CYST OF ANKLE AND FOOT REGION: Primary | ICD-10-CM

## 2024-02-16 DIAGNOSIS — M79.2 NEUROGENIC PAIN OF LEFT FOOT: ICD-10-CM

## 2024-02-16 DIAGNOSIS — M79.2 NEUROGENIC PAIN OF RIGHT FOOT: ICD-10-CM

## 2024-02-16 DIAGNOSIS — G89.29 CHRONIC HEEL PAIN, LEFT: ICD-10-CM

## 2024-02-16 DIAGNOSIS — M79.672 CHRONIC HEEL PAIN, LEFT: ICD-10-CM

## 2024-02-16 PROCEDURE — 3078F DIAST BP <80 MM HG: CPT | Mod: CPTII,S$GLB,, | Performed by: STUDENT IN AN ORGANIZED HEALTH CARE EDUCATION/TRAINING PROGRAM

## 2024-02-16 PROCEDURE — 99999 PR PBB SHADOW E&M-EST. PATIENT-LVL IV: CPT | Mod: PBBFAC,,, | Performed by: STUDENT IN AN ORGANIZED HEALTH CARE EDUCATION/TRAINING PROGRAM

## 2024-02-16 PROCEDURE — 3074F SYST BP LT 130 MM HG: CPT | Mod: CPTII,S$GLB,, | Performed by: STUDENT IN AN ORGANIZED HEALTH CARE EDUCATION/TRAINING PROGRAM

## 2024-02-16 PROCEDURE — 1159F MED LIST DOCD IN RCRD: CPT | Mod: CPTII,S$GLB,, | Performed by: STUDENT IN AN ORGANIZED HEALTH CARE EDUCATION/TRAINING PROGRAM

## 2024-02-16 PROCEDURE — 3008F BODY MASS INDEX DOCD: CPT | Mod: CPTII,S$GLB,, | Performed by: STUDENT IN AN ORGANIZED HEALTH CARE EDUCATION/TRAINING PROGRAM

## 2024-02-16 PROCEDURE — 3044F HG A1C LEVEL LT 7.0%: CPT | Mod: CPTII,S$GLB,, | Performed by: STUDENT IN AN ORGANIZED HEALTH CARE EDUCATION/TRAINING PROGRAM

## 2024-02-16 PROCEDURE — 99214 OFFICE O/P EST MOD 30 MIN: CPT | Mod: S$GLB,,, | Performed by: STUDENT IN AN ORGANIZED HEALTH CARE EDUCATION/TRAINING PROGRAM

## 2024-02-16 NOTE — PROGRESS NOTES
Subjective:     Patient    Medardo Vizcaino III is a 53 y.o. male.    Problem    Previously seen by Dr Griffith for left ankle pain and numbness, underwent EMG which showed weak tibialis anterior, was improved by custom foot orthoses. Presents today with chronic pain of both feet; primarily around the ankles and heels; more frequent and more intense on the left. Also with left heel numbness and altered temperature sensation with hypersensitivity of the lesser toes on the left foot. He is followed by orthopedics, sports medicine, pain management for back/neck pain and shoulder pain. Recently underwent spinal injection without improvement. Sometimes uses topical diclofenac without improvement. Has also attempted footwear change, stretching, massage, chiropractor without significant improvement. Also previously completed the healthy back program with some improvement. Hikes >5 miles 3-5 times/week.      History    History obtained from patient and review of medical records.     Past Medical History:   Diagnosis Date    Acromioclavicular joint arthritis 3/20/2014    Hypothyroidism        Past Surgical History:   Procedure Laterality Date    ARTHROSCOPIC DEBRIDEMENT OF SHOULDER Left 10/3/2022    Procedure: DEBRIDEMENT, SHOULDER, ARTHROSCOPIC-LABRUM;  Surgeon: Jef Lawrence MD;  Location: [facility] OR;  Service: Orthopedics;  Laterality: Left;    ARTHROSCOPIC REPAIR OF ROTATOR CUFF OF SHOULDER Left 10/3/2022    Procedure: REPAIR, ROTATOR CUFF, ARTHROSCOPIC;  Surgeon: Jef Lawrence MD;  Location: [facility] OR;  Service: Orthopedics;  Laterality: Left;  Regional with catheter (interscalene)    ARTHROSCOPY OF SHOULDER WITH DECOMPRESSION OF SUBACROMIAL SPACE Left 10/3/2022    Procedure: ARTHROSCOPY, SHOULDER, WITH SUBACROMIAL SPACE DECOMPRESSION;  Surgeon: Jef Lawrence MD;  Location: [facility] OR;  Service: Orthopedics;  Laterality: Left;    COLONOSCOPY N/A 4/21/2023    Procedure: COLONOSCOPY;  Surgeon: Jeff BERGER  MD [place];  Location: [facility] ENDO (4TH FLR);  Service: Endoscopy;  Laterality: N/A;  pt wants prep inst mailed-RB  no answer for precall/mleone lpn    EPIDURAL STEROID INJECTION N/A 2/9/2022    Procedure: Injection, Steroid, Epidural C7/T1  DIRECT REFERRAL;  Surgeon: Deon Strauss MD;  Location: [facility] PAIN MGT;  Service: Pain Management;  Laterality: N/A;    EPIDURAL STEROID INJECTION N/A 1/17/2024    Procedure: CERVICAL MARCELLA C7-T1 DIRECT REFERRAL;  Surgeon: Deon Strauss MD;  Location: [facility] PAIN MGT;  Service: Pain Management;  Laterality: N/A;  [phone]  *SCHEDULE IN JANUARY*    FIXATION OF TENDON Left 10/3/2022    Procedure: FIXATION, TENDON;  Surgeon: Jef Lawrence MD;  Location: [facility];  Service: Orthopedics;  Laterality: Left;    NASAL SEPTUM SURGERY      SHOULDER ARTHROSCOPY      SHOULDER SURGERY      TONSILLECTOMY      TUMOR EXCISION      arm; lipoma        Objective:     Vitals  Wt Readings from Last 1 Encounters:   02/16/24 82.1 kg (181 lb)     Temp Readings from Last 1 Encounters:   02/16/24 99.2 °F (37.3 °C)     BP Readings from Last 1 Encounters:   02/16/24 121/76     Pulse Readings from Last 1 Encounters:   02/16/24 63       Dermatological Exam    Skin:  Pedal hair growth, skin color, and skin texture normal on right  Pedal hair growth, skin color, and skin texture normal on left    Nails:  All nails normal in length, thickness, color    Vascular Exam    Arteries:  Posterior tibial artery palpable on right  Dorsalis pedis artery palpable on right  Posterior tibial artery palpable on left  Dorsalis pedis artery palpable on left    Veins:  Superficial veins unremarkable on right  Superficial veins unremarkable on left    Swelling:  None on right  None on left    Neurological Exam    Melvin touch test:  6/6 sites sensed, light touch intact    Provocation Sign:  + at tibial nerve and sural nerve on left     Slump Test:  - bilaterally    Musculoskeletal Exam    Footwear:  Athletic on  right  Athletic on left    Gait Exam:   Ambulatory Status: Ambulatory  Gait: Normal  Assistive Devices: None    Foot Progression Angle:  Normal on right  Normal on left    Right Lower Extremity Additional Findings:  Negative windlass test  Right foot and ankle function, strength, and range of motion unremarkable except as noted above.     Left Lower Extremity Additional Findings:  Negative windlass test  3rd toe synovial cyst located dorsally at DIPJ, not tender  Left foot and ankle function, strength, and range of motion unremarkable except as noted above.    Imaging and Other Tests    Imaging:  Independently reviewed and interpreted imaging, findings are as follows:     02/12/24 left foot X ray: early 1st MTPJ degeneration; otherwise unremarkable.      Assessment:     Encounter Diagnoses   Name Primary?    Chronic heel pain, left     Neurogenic pain of left foot     Neurogenic pain of right foot     Synovial cyst of ankle and foot region Yes        Plan:     I counseled the patient on his conditions, their implications and medical management.    Neurogenic pain of both feet: chronic stable  -Suspect spinal origin, much less likely local entrapments.  -Recommended starting duloxetine. Patient requested the input of his other providers first; I personally reached out to Dr Munoz and Dr Lawrence regarding this and will get back to the patient once I hear back.   -Physical activity as tolerated.     Synovial cyst left 3rd toe, pain: chronic stable  -Discussed aspiration/injection vs excision. Discussed risk of recurrence. Patient will monitor for now.       Return to clinic PRN.

## 2024-02-19 ENCOUNTER — TELEPHONE (OUTPATIENT)
Dept: PODIATRY | Facility: CLINIC | Age: 54
End: 2024-02-19
Payer: COMMERCIAL

## 2024-02-19 DIAGNOSIS — M79.2 NEUROGENIC PAIN OF LEFT FOOT: Primary | ICD-10-CM

## 2024-02-19 DIAGNOSIS — M79.2 NEUROGENIC PAIN OF RIGHT FOOT: ICD-10-CM

## 2024-02-19 RX ORDER — DULOXETIN HYDROCHLORIDE 30 MG/1
30 CAPSULE, DELAYED RELEASE ORAL DAILY
Qty: 30 CAPSULE | Refills: 11 | Status: SHIPPED | OUTPATIENT
Start: 2024-02-19 | End: 2024-03-22

## 2024-02-19 NOTE — TELEPHONE ENCOUNTER
----- Message from Maryjo Silvestre MA sent at 2/19/2024 12:39 PM CST -----  Regarding: RE: Medicaiton management for Mr Vizcaino  Pt would like Duloxetine sent to [facility] please.    Thanks Maryjo  ----- Message -----  From: Nilesh Rodriguez DPM  Sent: 2/19/2024  12:17 PM CST  To: Maryjo Silvestre MA  Subject: RE: Medicaiton management for Mr Vizcaino         Please contact pt to let him know that both Dr Munoz and Dr Lawrence got back to me and are OK with us starting duloxetine or another similar baseline medication, as we previously discussed. Please find out which pharmacy he wants this sent to and also arrange a 1 month followup for nerve pain so I can monitor/titrate this medication for him.    David Wilde    ----- Message -----  From: Jef Lawrence MD  Sent: 2/19/2024  11:56 AM CST  To: Nestor Munoz MD; Nilesh Rodriguez DPM  Subject: RE: Medicaiton management for Mr Vizcaino         Yes.  Sounds good.  Thanks for the follow-up.  ----- Message -----  From: Nilesh Rodriguez DPM  Sent: 2/16/2024  12:27 PM CST  To: Jef Lawrence MD; Nestor Munoz MD  Subject: Medicaiton management for Mr Vizcaino             Saw Mr Vizcaino today for chronic left foot pain; also with numbness, cramping, hypersensitivity, and altered temperature sensations in the foot. I know that he has neck and shoulder issues that y'all are following him for. I discussed starting some baseline medications like duloxetine that might help us all out a little bit. He seemed on board but said he wanted to touch base with both of you before starting these meds. It would prob take months for him to individually see all of us and coordinate that so I'm sending this message.    Essentially, are y'all OK if I start duloxetine or something similar for this pt?    David Rodriguez

## 2024-02-25 ENCOUNTER — OFFICE VISIT (OUTPATIENT)
Dept: URGENT CARE | Facility: CLINIC | Age: 54
End: 2024-02-25
Payer: COMMERCIAL

## 2024-02-25 VITALS
HEART RATE: 87 BPM | OXYGEN SATURATION: 99 % | WEIGHT: 181 LBS | HEIGHT: 68 IN | BODY MASS INDEX: 27.43 KG/M2 | DIASTOLIC BLOOD PRESSURE: 80 MMHG | RESPIRATION RATE: 16 BRPM | SYSTOLIC BLOOD PRESSURE: 124 MMHG | TEMPERATURE: 98 F

## 2024-02-25 DIAGNOSIS — T07.XXXA MULTIPLE ABRASIONS: ICD-10-CM

## 2024-02-25 DIAGNOSIS — S03.2XXA TOOTH AVULSION, INITIAL ENCOUNTER: ICD-10-CM

## 2024-02-25 DIAGNOSIS — S01.511A LIP LACERATION, INITIAL ENCOUNTER: Primary | ICD-10-CM

## 2024-02-25 DIAGNOSIS — Z92.29 UP TO DATE WITH TETANUS VACCINATION: ICD-10-CM

## 2024-02-25 PROCEDURE — 12013 RPR F/E/E/N/L/M 2.6-5.0 CM: CPT | Mod: S$GLB,,, | Performed by: FAMILY MEDICINE

## 2024-02-25 PROCEDURE — 99203 OFFICE O/P NEW LOW 30 MIN: CPT | Mod: 25,S$GLB,, | Performed by: FAMILY MEDICINE

## 2024-02-25 RX ORDER — MUPIROCIN 20 MG/G
OINTMENT TOPICAL 2 TIMES DAILY
Qty: 90 G | Refills: 1 | Status: SHIPPED | OUTPATIENT
Start: 2024-02-25 | End: 2024-03-03

## 2024-02-25 RX ORDER — CEPHALEXIN 500 MG/1
500 CAPSULE ORAL EVERY 8 HOURS
Qty: 21 CAPSULE | Refills: 0 | Status: SHIPPED | OUTPATIENT
Start: 2024-02-25 | End: 2024-03-04

## 2024-02-25 NOTE — PATIENT INSTRUCTIONS
No Vicrylrapid available  Remove absorbable suture in 5 days  Eat soft foods for two to three days.  ?Rinse the mouth with water after eating.  ?Avoid spicy or salty foods until the wound is healed.  ?Avoid the use of straws (negative pressure may increase ecchymosis or bleeding at the wound site).

## 2024-02-25 NOTE — PROCEDURES
Laceration Repair    Date/Time: 2/25/2024 12:15 PM    Performed by: Sarah Atlman MD  Authorized by: Sarah Altman MD  Body area: mouth  Location details: lower lip, interior  Laceration length: 5 cm  Anesthesia: nerve block  Preparation: Patient was prepped and draped in the usual sterile fashion.  Wound mucous membrane closure material used: 5-0 Vicryl.  Technique: running  Approximation: close  Patient tolerance: Patient tolerated the procedure well with no immediate complications
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
## 2024-03-04 ENCOUNTER — OFFICE VISIT (OUTPATIENT)
Dept: INTERNAL MEDICINE | Facility: CLINIC | Age: 54
End: 2024-03-04
Payer: COMMERCIAL

## 2024-03-04 VITALS
OXYGEN SATURATION: 98 % | BODY MASS INDEX: 26.47 KG/M2 | HEIGHT: 68 IN | WEIGHT: 174.63 LBS | DIASTOLIC BLOOD PRESSURE: 80 MMHG | HEART RATE: 67 BPM | SYSTOLIC BLOOD PRESSURE: 114 MMHG

## 2024-03-04 DIAGNOSIS — R68.84 JAW PAIN: Primary | ICD-10-CM

## 2024-03-04 DIAGNOSIS — S01.511A LIP LACERATION, INITIAL ENCOUNTER: ICD-10-CM
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
## 2024-03-04 PROCEDURE — 3079F DIAST BP 80-89 MM HG: CPT | Mod: CPTII,S$GLB,, | Performed by: INTERNAL MEDICINE

## 2024-03-04 PROCEDURE — 99214 OFFICE O/P EST MOD 30 MIN: CPT | Mod: S$GLB,,, | Performed by: INTERNAL MEDICINE

## 2024-03-04 PROCEDURE — 3044F HG A1C LEVEL LT 7.0%: CPT | Mod: CPTII,S$GLB,, | Performed by: INTERNAL MEDICINE

## 2024-03-04 PROCEDURE — 1159F MED LIST DOCD IN RCRD: CPT | Mod: CPTII,S$GLB,, | Performed by: INTERNAL MEDICINE

## 2024-03-04 PROCEDURE — 3008F BODY MASS INDEX DOCD: CPT | Mod: CPTII,S$GLB,, | Performed by: INTERNAL MEDICINE

## 2024-03-04 PROCEDURE — 1160F RVW MEDS BY RX/DR IN RCRD: CPT | Mod: CPTII,S$GLB,, | Performed by: INTERNAL MEDICINE

## 2024-03-04 PROCEDURE — 3074F SYST BP LT 130 MM HG: CPT | Mod: CPTII,S$GLB,, | Performed by: INTERNAL MEDICINE

## 2024-03-04 PROCEDURE — 99999 PR PBB SHADOW E&M-EST. PATIENT-LVL V: CPT | Mod: PBBFAC,,, | Performed by: INTERNAL MEDICINE
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
## 2024-03-05 ENCOUNTER — TELEPHONE (OUTPATIENT)
Dept: INTERNAL MEDICINE | Facility: CLINIC | Age: 54
End: 2024-03-05
Payer: COMMERCIAL

## 2024-03-05 ENCOUNTER — HOSPITAL ENCOUNTER (OUTPATIENT)
Dept: RADIOLOGY | Facility: HOSPITAL | Age: 54
Discharge: HOME | End: 2024-03-05
Attending: INTERNAL MEDICINE
Payer: COMMERCIAL

## 2024-03-05 DIAGNOSIS — R68.84 JAW PAIN: ICD-10-CM
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
## 2024-03-05 PROCEDURE — 70100 X-RAY EXAM OF JAW <4VIEWS: CPT | Mod: 26,,, | Performed by: RADIOLOGY

## 2024-03-05 PROCEDURE — 70100 X-RAY EXAM OF JAW <4VIEWS: CPT | Mod: TC
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
## 2024-03-06 NOTE — TELEPHONE ENCOUNTER
Patient verbalized understanding about xray results.  ENT has reached out to him but he is going to hold off on using the medication at this time. Also wanted to inform you that he was put on Cymbalta by his podiatrist. Med list updated.

## 2024-03-08 ENCOUNTER — TELEPHONE (OUTPATIENT)
Dept: INTERNAL MEDICINE | Facility: CLINIC | Age: 54
End: 2024-03-08
Payer: COMMERCIAL

## 2024-03-08 NOTE — TELEPHONE ENCOUNTER
Patient has been given a referral for maxillofacials and endodontics because his dentist does not have the capability of doing this xray. Thought he could do it medically but we also do not have a oral surgeon in the system. Will make appts for evaluation.

## 2024-03-08 NOTE — TELEPHONE ENCOUNTER
----- Message from Patrica Navarrete MA sent at 3/8/2024  9:25 AM CST -----  Contact: [phone] Patient    ----- Message -----  From: Autumn Shay  Sent: 3/8/2024   9:09 AM CST  To: April FARFAN Staff    Pt is calling in regards to needing a call back to talk about a panoramic X-Ray. Please call and advise. Thank you

## 2024-03-21 ENCOUNTER — TELEPHONE (OUTPATIENT)
Dept: INTERNAL MEDICINE | Facility: CLINIC | Age: 54
End: 2024-03-21
Payer: COMMERCIAL

## 2024-03-21 NOTE — TELEPHONE ENCOUNTER
----- Message from Tierney Jacobo sent at 3/21/2024 12:39 PM CDT -----  Contact: Self/[phone]  [email or web address]     Patient is calling for Medical Advice regarding: pt calling to get a copy of X-rays      Would like response via [employer]:  Return call     Comments: pt stated that he was seen by his oral surgeon had a Ct-scan done that shows his jaw Is broken , pt stated that he needs a copy of the  Xray that Dr Chen ordered on 3/5 bc he was told from that test that his jaw was not broken, pt stated that he will be at [facility] tomorrow and would like to know if a copy can be printed there or does he need to pick it up from Dr Chen's office. Please advise

## 2024-03-21 NOTE — TELEPHONE ENCOUNTER
Called and spoke to pt on the phone -- let pt know that he will be able to get a copy of the xray at his podiatry appt but if he or they have any issues to send us another message and we will be able to help him out

## 2024-03-22 ENCOUNTER — OFFICE VISIT (OUTPATIENT)
Dept: PODIATRY | Facility: CLINIC | Age: 54
End: 2024-03-22
Payer: COMMERCIAL

## 2024-03-22 VITALS
SYSTOLIC BLOOD PRESSURE: 116 MMHG | HEIGHT: 68 IN | BODY MASS INDEX: 26.13 KG/M2 | RESPIRATION RATE: 18 BRPM | HEART RATE: 57 BPM | WEIGHT: 172.38 LBS | DIASTOLIC BLOOD PRESSURE: 72 MMHG | TEMPERATURE: 98 F | OXYGEN SATURATION: 98 %

## 2024-03-22 DIAGNOSIS — M79.2 NEUROGENIC PAIN OF RIGHT FOOT: ICD-10-CM

## 2024-03-22 DIAGNOSIS — M79.2 NEUROGENIC PAIN OF LEFT FOOT: Primary | ICD-10-CM

## 2024-03-22 PROCEDURE — 99213 OFFICE O/P EST LOW 20 MIN: CPT | Mod: S$GLB,,, | Performed by: STUDENT IN AN ORGANIZED HEALTH CARE EDUCATION/TRAINING PROGRAM

## 2024-03-22 PROCEDURE — 3074F SYST BP LT 130 MM HG: CPT | Mod: CPTII,S$GLB,, | Performed by: STUDENT IN AN ORGANIZED HEALTH CARE EDUCATION/TRAINING PROGRAM

## 2024-03-22 PROCEDURE — 3008F BODY MASS INDEX DOCD: CPT | Mod: CPTII,S$GLB,, | Performed by: STUDENT IN AN ORGANIZED HEALTH CARE EDUCATION/TRAINING PROGRAM

## 2024-03-22 PROCEDURE — 99999 PR PBB SHADOW E&M-EST. PATIENT-LVL III: CPT | Mod: PBBFAC,,, | Performed by: STUDENT IN AN ORGANIZED HEALTH CARE EDUCATION/TRAINING PROGRAM

## 2024-03-22 PROCEDURE — 3078F DIAST BP <80 MM HG: CPT | Mod: CPTII,S$GLB,, | Performed by: STUDENT IN AN ORGANIZED HEALTH CARE EDUCATION/TRAINING PROGRAM

## 2024-03-22 PROCEDURE — 3044F HG A1C LEVEL LT 7.0%: CPT | Mod: CPTII,S$GLB,, | Performed by: STUDENT IN AN ORGANIZED HEALTH CARE EDUCATION/TRAINING PROGRAM

## 2024-03-22 RX ORDER — DULOXETINE 40 MG/1
40 CAPSULE, DELAYED RELEASE ORAL DAILY
Qty: 30 CAPSULE | Refills: 11 | Status: SHIPPED | OUTPATIENT
Start: 2024-03-22 | End: 2024-03-22

## 2024-03-22 RX ORDER — DULOXETIN HYDROCHLORIDE 20 MG/1
40 CAPSULE, DELAYED RELEASE ORAL DAILY
Qty: 60 CAPSULE | Refills: 11 | Status: SHIPPED | OUTPATIENT
Start: 2024-03-22 | End: 2024-06-14 | Stop reason: SDUPTHER

## 2024-03-22 NOTE — PROGRESS NOTES
Subjective:     Patient    Medardo Vizcaino III is a 53 y.o. male.    Problem    02/16/24: Previously seen by Dr Griffith for left ankle pain and numbness, underwent EMG which showed weak tibialis anterior, was improved by custom foot orthoses. Presents today with chronic pain of both feet; primarily around the ankles and heels; more frequent and more intense on the left. Also with left heel numbness and altered temperature sensation with hypersensitivity of the lesser toes on the left foot. He is followed by orthopedics, sports medicine, pain management for back/neck pain and shoulder pain. Recently underwent spinal injection without improvement. Sometimes uses topical diclofenac without improvement. Has also attempted footwear change, stretching, massage, chiropractor without significant improvement. Also previously completed the healthy back program with some improvement. Hikes >5 miles 3-5 times/week.      03/22/24: Returns for followup of BLE chronic nerve pain L>R. Started on duloxetine after last visit. Foot pain drastically improved, other multi-site body pain and nerve symptoms also improved.      History    History obtained from patient and review of medical records.     Past Medical History:   Diagnosis Date    Acromioclavicular joint arthritis 3/20/2014    Hypothyroidism        Past Surgical History:   Procedure Laterality Date    ARTHROSCOPIC DEBRIDEMENT OF SHOULDER Left 10/3/2022    Procedure: DEBRIDEMENT, SHOULDER, ARTHROSCOPIC-LABRUM;  Surgeon: Jef Lawrence MD;  Location: [facility] OR;  Service: Orthopedics;  Laterality: Left;    ARTHROSCOPIC REPAIR OF ROTATOR CUFF OF SHOULDER Left 10/3/2022    Procedure: REPAIR, ROTATOR CUFF, ARTHROSCOPIC;  Surgeon: Jef Lawrence MD;  Location: [facility] OR;  Service: Orthopedics;  Laterality: Left;  Regional with catheter (interscalene)    ARTHROSCOPY OF SHOULDER WITH DECOMPRESSION OF SUBACROMIAL SPACE Left 10/3/2022    Procedure: ARTHROSCOPY, SHOULDER,  WITH SUBACROMIAL SPACE DECOMPRESSION;  Surgeon: Jef Lawrence MD;  Location: [facility] OR;  Service: Orthopedics;  Laterality: Left;    COLONOSCOPY N/A 4/21/2023    Procedure: COLONOSCOPY;  Surgeon: Jeff Hough MD;  Location: [facility] ENDO (4TH FLR);  Service: Endoscopy;  Laterality: N/A;  pt wants prep inst mailed-RB  no answer for precall/mleone lpn    EPIDURAL STEROID INJECTION N/A 2/9/2022    Procedure: Injection, Steroid, Epidural C7/T1  DIRECT REFERRAL;  Surgeon: Deon Strauss MD;  Location: [facility] PAIN MGT;  Service: Pain Management;  Laterality: N/A;    EPIDURAL STEROID INJECTION N/A 1/17/2024    Procedure: CERVICAL MARCELLA C7-T1 DIRECT REFERRAL;  Surgeon: Deon Strauss MD;  Location: [facility] PAIN MGT;  Service: Pain Management;  Laterality: N/A;  [phone]  *SCHEDULE IN JANUARY*    FIXATION OF TENDON Left 10/3/2022    Procedure: FIXATION, TENDON;  Surgeon: Jef Lawrence MD;  Location: [facility];  Service: Orthopedics;  Laterality: Left;    NASAL SEPTUM SURGERY      SHOULDER ARTHROSCOPY      SHOULDER SURGERY      TONSILLECTOMY      TUMOR EXCISION      arm; lipoma        Objective:     Vitals  Wt Readings from Last 1 Encounters:   03/22/24 78.2 kg (172 lb 6.4 oz)     Temp Readings from Last 1 Encounters:   03/22/24 98.3 °F (36.8 °C)     BP Readings from Last 1 Encounters:   03/22/24 116/72     Pulse Readings from Last 1 Encounters:   03/22/24 (!) 57       Dermatological Exam    Skin:  Pedal hair growth, skin color, and skin texture normal on right  Pedal hair growth, skin color, and skin texture normal on left    Nails:  All nails normal in length, thickness, color    Vascular Exam    Arteries:  Posterior tibial artery palpable on right  Dorsalis pedis artery palpable on right  Posterior tibial artery palpable on left  Dorsalis pedis artery palpable on left    Veins:  Superficial veins unremarkable on right  Superficial veins unremarkable on left    Swelling:  None on right  None on  left    Neurological Exam    [place] touch test:  6/6 sites sensed, light touch intact    Provocation Sign:  + at tibial nerve and sural nerve on left     Slump Test:  - bilaterally    Musculoskeletal Exam    Footwear:  Athletic on right  Athletic on left    Gait Exam:   Ambulatory Status: Ambulatory  Gait: Normal  Assistive Devices: None    Foot Progression Angle:  Normal on right  Normal on left    Right Lower Extremity Additional Findings:  Negative windlass test  Right foot and ankle function, strength, and range of motion unremarkable except as noted above.     Left Lower Extremity Additional Findings:  Negative windlass test  3rd toe synovial cyst located dorsally at DIPJ, not tender  Left foot and ankle function, strength, and range of motion unremarkable except as noted above.    Imaging and Other Tests    Imagin24 left foot X ray: early 1st MTPJ degeneration; otherwise unremarkable.     Independently reviewed and interpreted imaging, findings are as follows: N/A     Assessment:     Encounter Diagnoses   Name Primary?    Neurogenic pain of left foot Yes    Neurogenic pain of right foot           Plan:     I counseled the patient on his conditions, their implications and medical management.    Neurogenic pain of both feet: chronic stable  -Suspect spinal origin, much less likely local entrapments.  -Increase duloxetine to 40 mg/day. OK with PCP taking over medication.   -Physical activity as tolerated.        Return to clinic PRN.

## 2024-03-27 ENCOUNTER — OFFICE VISIT (OUTPATIENT)
Dept: ORTHOPEDICS | Facility: CLINIC | Age: 54
End: 2024-03-27
Payer: COMMERCIAL

## 2024-03-27 VITALS — WEIGHT: 171.5 LBS | BODY MASS INDEX: 26.08 KG/M2

## 2024-03-27 DIAGNOSIS — M54.12 CERVICAL RADICULOPATHY: Primary | ICD-10-CM

## 2024-03-27 PROCEDURE — 99999 PR PBB SHADOW E&M-EST. PATIENT-LVL III: CPT | Mod: PBBFAC,,, | Performed by: ORTHOPAEDIC SURGERY

## 2024-03-27 PROCEDURE — 1159F MED LIST DOCD IN RCRD: CPT | Mod: CPTII,S$GLB,, | Performed by: ORTHOPAEDIC SURGERY

## 2024-03-27 PROCEDURE — 99213 OFFICE O/P EST LOW 20 MIN: CPT | Mod: S$GLB,,, | Performed by: ORTHOPAEDIC SURGERY

## 2024-03-27 PROCEDURE — 3008F BODY MASS INDEX DOCD: CPT | Mod: CPTII,S$GLB,, | Performed by: ORTHOPAEDIC SURGERY

## 2024-03-27 PROCEDURE — 3044F HG A1C LEVEL LT 7.0%: CPT | Mod: CPTII,S$GLB,, | Performed by: ORTHOPAEDIC SURGERY
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
## 2024-06-14 ENCOUNTER — OFFICE VISIT (OUTPATIENT)
Dept: INTERNAL MEDICINE | Facility: CLINIC | Age: 54
End: 2024-06-14
Payer: COMMERCIAL

## 2024-06-14 VITALS
DIASTOLIC BLOOD PRESSURE: 88 MMHG | HEART RATE: 58 BPM | HEIGHT: 68 IN | WEIGHT: 175.5 LBS | OXYGEN SATURATION: 98 % | BODY MASS INDEX: 26.6 KG/M2 | SYSTOLIC BLOOD PRESSURE: 120 MMHG

## 2024-06-14 DIAGNOSIS — M79.2 NEUROGENIC PAIN OF RIGHT FOOT: ICD-10-CM

## 2024-06-14 DIAGNOSIS — S01.511A LIP LACERATION, INITIAL ENCOUNTER: Primary | ICD-10-CM

## 2024-06-14 DIAGNOSIS — M79.2 NEUROGENIC PAIN OF LEFT FOOT: ICD-10-CM

## 2024-06-14 DIAGNOSIS — S02.609A CLOSED FRACTURE OF JAW, INITIAL ENCOUNTER: ICD-10-CM

## 2024-06-14 PROCEDURE — 99213 OFFICE O/P EST LOW 20 MIN: CPT | Mod: S$GLB,,, | Performed by: INTERNAL MEDICINE

## 2024-06-14 PROCEDURE — 3008F BODY MASS INDEX DOCD: CPT | Mod: CPTII,S$GLB,, | Performed by: INTERNAL MEDICINE

## 2024-06-14 PROCEDURE — 1159F MED LIST DOCD IN RCRD: CPT | Mod: CPTII,S$GLB,, | Performed by: INTERNAL MEDICINE

## 2024-06-14 PROCEDURE — 1160F RVW MEDS BY RX/DR IN RCRD: CPT | Mod: CPTII,S$GLB,, | Performed by: INTERNAL MEDICINE

## 2024-06-14 PROCEDURE — 3074F SYST BP LT 130 MM HG: CPT | Mod: CPTII,S$GLB,, | Performed by: INTERNAL MEDICINE

## 2024-06-14 PROCEDURE — 3079F DIAST BP 80-89 MM HG: CPT | Mod: CPTII,S$GLB,, | Performed by: INTERNAL MEDICINE

## 2024-06-14 PROCEDURE — 3044F HG A1C LEVEL LT 7.0%: CPT | Mod: CPTII,S$GLB,, | Performed by: INTERNAL MEDICINE

## 2024-06-14 PROCEDURE — 99999 PR PBB SHADOW E&M-EST. PATIENT-LVL IV: CPT | Mod: PBBFAC,,, | Performed by: INTERNAL MEDICINE

## 2024-06-14 RX ORDER — DULOXETIN HYDROCHLORIDE 20 MG/1
40 CAPSULE, DELAYED RELEASE ORAL DAILY
Start: 2024-06-14

## 2024-06-14 RX ORDER — GLUCOSAMINE/CHONDRO SU A 500-400 MG
1 TABLET ORAL 3 TIMES DAILY
COMMUNITY
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
## 2024-06-17 ENCOUNTER — TELEPHONE (OUTPATIENT)
Dept: INTERNAL MEDICINE | Facility: CLINIC | Age: 54
End: 2024-06-17
Payer: COMMERCIAL

## 2024-06-17 NOTE — TELEPHONE ENCOUNTER
Can we let the patient know that Dr. Perez did send me a reply.  Said both he and Dr. Hernandez do facial plastic surgery at [facility].  The patient was wanting a few doctor names so please let him know these.
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
## 2024-06-26 ENCOUNTER — OFFICE VISIT (OUTPATIENT)
Dept: ORTHOPEDICS | Facility: CLINIC | Age: 54
End: 2024-06-26
Payer: COMMERCIAL

## 2024-06-26 DIAGNOSIS — M47.812 CERVICAL SPONDYLOSIS: Primary | ICD-10-CM

## 2024-06-26 PROCEDURE — 1159F MED LIST DOCD IN RCRD: CPT | Mod: CPTII,S$GLB,, | Performed by: ORTHOPAEDIC SURGERY

## 2024-06-26 PROCEDURE — 3044F HG A1C LEVEL LT 7.0%: CPT | Mod: CPTII,S$GLB,, | Performed by: ORTHOPAEDIC SURGERY

## 2024-06-26 PROCEDURE — 99212 OFFICE O/P EST SF 10 MIN: CPT | Mod: S$GLB,,, | Performed by: ORTHOPAEDIC SURGERY

## 2024-06-26 PROCEDURE — 99999 PR PBB SHADOW E&M-EST. PATIENT-LVL II: CPT | Mod: PBBFAC,,, | Performed by: ORTHOPAEDIC SURGERY
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
## 2024-07-03 ENCOUNTER — OFFICE VISIT (OUTPATIENT)
Dept: OTOLARYNGOLOGY | Facility: CLINIC | Age: 54
End: 2024-07-03
Payer: COMMERCIAL

## 2024-07-03 VITALS
SYSTOLIC BLOOD PRESSURE: 113 MMHG | WEIGHT: 177.69 LBS | HEART RATE: 59 BPM | BODY MASS INDEX: 27.02 KG/M2 | DIASTOLIC BLOOD PRESSURE: 79 MMHG

## 2024-07-03 DIAGNOSIS — S01.511A LIP LACERATION, INITIAL ENCOUNTER: ICD-10-CM

## 2024-07-03 PROCEDURE — 99999 PR PBB SHADOW E&M-EST. PATIENT-LVL III: CPT | Mod: PBBFAC,,, | Performed by: OTOLARYNGOLOGY
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
## 2024-07-17 DIAGNOSIS — M79.2 NEUROGENIC PAIN OF LEFT FOOT: ICD-10-CM

## 2024-07-17 DIAGNOSIS — M79.2 NEUROGENIC PAIN OF RIGHT FOOT: ICD-10-CM

## 2024-07-17 RX ORDER — DULOXETIN HYDROCHLORIDE 20 MG/1
40 CAPSULE, DELAYED RELEASE ORAL DAILY
Qty: 60 CAPSULE | Refills: 11 | Status: SHIPPED | OUTPATIENT
Start: 2024-07-17 | End: 2025-07-17

## 2025-01-11 NOTE — TELEPHONE ENCOUNTER
Care Due:                  Date            Visit Type   Department     Provider  --------------------------------------------------------------------------------                                EP -                              PRIMARY      [facility] INTER[facility]  Last Visit: 06-      CARE ([facility])   MEDICINE       Arron Chen  Next Visit: None Scheduled  None         None Found                                                            Last  Test          Frequency    Reason                     Performed    Due Date  --------------------------------------------------------------------------------    Cr..........  12 months..  DULoxetine...............  01- 12-    [facility] Embedded Care Due Messages. Reference number: [id number].   1/11/2025 3:38:23 PM CST

## 2025-01-12 RX ORDER — LEVOTHYROXINE SODIUM 75 UG/1
TABLET ORAL
Qty: 90 TABLET | Refills: 0 | Status: SHIPPED | OUTPATIENT
Start: 2025-01-12

## 2025-01-12 NOTE — TELEPHONE ENCOUNTER
Refill Routing Note   Medication(s) are not appropriate for processing by [facility] for the following reason(s):      Required labs outdated    [facility] action(s):  Defer Care Due:  Labs due            Appointments  past 12m or future 3m with PCP    Date Provider   Last Visit   6/14/2024 Arron Chen MD   Next Visit   Visit date not found Arron Chen MD   ED visits in past 90 days: 0        Note composed:11:31 AM 01/12/2025

## 2025-01-29 DIAGNOSIS — I10 HTN (HYPERTENSION), BENIGN: ICD-10-CM

## 2025-03-31 ENCOUNTER — OFFICE VISIT (OUTPATIENT)
Dept: INTERNAL MEDICINE | Facility: CLINIC | Age: 55
End: 2025-03-31
Payer: COMMERCIAL

## 2025-03-31 ENCOUNTER — LAB VISIT (OUTPATIENT)
Dept: LAB | Facility: HOSPITAL | Age: 55
End: 2025-03-31
Attending: INTERNAL MEDICINE
Payer: COMMERCIAL

## 2025-03-31 VITALS
DIASTOLIC BLOOD PRESSURE: 81 MMHG | WEIGHT: 181.88 LBS | OXYGEN SATURATION: 97 % | RESPIRATION RATE: 18 BRPM | BODY MASS INDEX: 27.57 KG/M2 | HEIGHT: 68 IN | HEART RATE: 43 BPM | SYSTOLIC BLOOD PRESSURE: 120 MMHG

## 2025-03-31 DIAGNOSIS — Z00.00 ROUTINE PHYSICAL EXAMINATION: ICD-10-CM

## 2025-03-31 DIAGNOSIS — M79.2 NEUROGENIC PAIN OF LEFT FOOT: ICD-10-CM

## 2025-03-31 DIAGNOSIS — I10 HTN (HYPERTENSION), BENIGN: ICD-10-CM

## 2025-03-31 DIAGNOSIS — D17.21 LIPOMA OF RIGHT UPPER EXTREMITY: ICD-10-CM

## 2025-03-31 DIAGNOSIS — M79.2 NEUROGENIC PAIN OF RIGHT FOOT: ICD-10-CM

## 2025-03-31 DIAGNOSIS — Z00.00 ROUTINE PHYSICAL EXAMINATION: Primary | ICD-10-CM

## 2025-03-31 LAB
ABSOLUTE EOSINOPHIL (OHS): 0.2 K/UL
ABSOLUTE MONOCYTE (OHS): 0.34 K/UL (ref 0.3–1)
ABSOLUTE NEUTROPHIL COUNT (OHS): 3.37 K/UL (ref 1.8–7.7)
BASOPHILS # BLD AUTO: 0.03 K/UL
BASOPHILS NFR BLD AUTO: 0.6 %
EAG (OHS): 94 MG/DL (ref 68–131)
ERYTHROCYTE [DISTWIDTH] IN BLOOD BY AUTOMATED COUNT: 12.2 % (ref 11.5–14.5)
HBA1C MFR BLD: 4.9 % (ref 4–5.6)
HCT VFR BLD AUTO: 45.4 % (ref 40–54)
HGB BLD-MCNC: 15.4 GM/DL (ref 14–18)
IMM GRANULOCYTES # BLD AUTO: 0.01 K/UL (ref 0–0.04)
IMM GRANULOCYTES NFR BLD AUTO: 0.2 % (ref 0–0.5)
LYMPHOCYTES # BLD AUTO: 1.36 K/UL (ref 1–4.8)
MCH RBC QN AUTO: 31.1 PG (ref 27–31)
MCHC RBC AUTO-ENTMCNC: 33.9 G/DL (ref 32–36)
MCV RBC AUTO: 92 FL (ref 82–98)
NUCLEATED RBC (/100WBC) (OHS): 0 /100 WBC
PLATELET # BLD AUTO: 217 K/UL (ref 150–450)
PMV BLD AUTO: 9.6 FL (ref 9.2–12.9)
RBC # BLD AUTO: 4.95 M/UL (ref 4.6–6.2)
RELATIVE EOSINOPHIL (OHS): 3.8 %
RELATIVE LYMPHOCYTE (OHS): 25.6 % (ref 18–48)
RELATIVE MONOCYTE (OHS): 6.4 % (ref 4–15)
RELATIVE NEUTROPHIL (OHS): 63.4 % (ref 38–73)
WBC # BLD AUTO: 5.31 K/UL (ref 3.9–12.7)

## 2025-03-31 PROCEDURE — 99396 PREV VISIT EST AGE 40-64: CPT | Mod: S$GLB,,, | Performed by: INTERNAL MEDICINE

## 2025-03-31 PROCEDURE — 83036 HEMOGLOBIN GLYCOSYLATED A1C: CPT

## 2025-03-31 PROCEDURE — 1160F RVW MEDS BY RX/DR IN RCRD: CPT | Mod: CPTII,S$GLB,, | Performed by: INTERNAL MEDICINE

## 2025-03-31 PROCEDURE — 3008F BODY MASS INDEX DOCD: CPT | Mod: CPTII,S$GLB,, | Performed by: INTERNAL MEDICINE

## 2025-03-31 PROCEDURE — 1159F MED LIST DOCD IN RCRD: CPT | Mod: CPTII,S$GLB,, | Performed by: INTERNAL MEDICINE

## 2025-03-31 PROCEDURE — 80053 COMPREHEN METABOLIC PANEL: CPT

## 2025-03-31 PROCEDURE — 80061 LIPID PANEL: CPT

## 2025-03-31 PROCEDURE — 3079F DIAST BP 80-89 MM HG: CPT | Mod: CPTII,S$GLB,, | Performed by: INTERNAL MEDICINE

## 2025-03-31 PROCEDURE — 36415 COLL VENOUS BLD VENIPUNCTURE: CPT

## 2025-03-31 PROCEDURE — 84153 ASSAY OF PSA TOTAL: CPT

## 2025-03-31 PROCEDURE — 85025 COMPLETE CBC W/AUTO DIFF WBC: CPT

## 2025-03-31 PROCEDURE — 84443 ASSAY THYROID STIM HORMONE: CPT

## 2025-03-31 PROCEDURE — 3074F SYST BP LT 130 MM HG: CPT | Mod: CPTII,S$GLB,, | Performed by: INTERNAL MEDICINE

## 2025-03-31 PROCEDURE — 99999 PR PBB SHADOW E&M-EST. PATIENT-LVL IV: CPT | Mod: PBBFAC,,, | Performed by: INTERNAL MEDICINE

## 2025-03-31 RX ORDER — DULOXETIN HYDROCHLORIDE 20 MG/1
40 CAPSULE, DELAYED RELEASE ORAL DAILY
Qty: 180 CAPSULE | Refills: 4 | Status: SHIPPED | OUTPATIENT
Start: 2025-03-31 | End: 2026-03-31

## 2025-03-31 RX ORDER — DULOXETIN HYDROCHLORIDE 20 MG/1
40 CAPSULE, DELAYED RELEASE ORAL DAILY
Status: CANCELLED
Start: 2025-03-31

## 2025-03-31 RX ORDER — LEVOTHYROXINE SODIUM 75 UG/1
75 TABLET ORAL DAILY
Qty: 90 TABLET | Refills: 4 | Status: SHIPPED | OUTPATIENT
Start: 2025-03-31

## 2025-03-31 NOTE — PROGRESS NOTES
Subjective:       Patient ID: Medardo Vizcaino III is a 54 y.o. male.    Chief Complaint: Annual Exam    Patient well known to me comes in for annual exam.  Overall doing well.  He actually has a new job, no longer working for [employer].    Overall doing well.  Musculoskeletal symptoms have stabilized and improved a bit although not back to normal baseline especially prior to his injury.  He would like to update some blood work.  Next year he will be due for colon screening.  Updating family history his father  of complications from a cardiac ablation.    Otherwise he is trying to remain healthy, stay active.  No other complaints.      Review of Systems   Constitutional:  Negative for chills, fatigue and fever.   HENT:  Negative for nosebleeds and trouble swallowing.    Eyes:  Negative for pain and visual disturbance.   Respiratory:  Negative for cough, shortness of breath and wheezing.    Cardiovascular:  Negative for chest pain and palpitations.   Gastrointestinal:  Negative for abdominal pain, constipation, diarrhea, nausea and vomiting.   Genitourinary:  Negative for difficulty urinating and hematuria.   Musculoskeletal:  Positive for arthralgias. Negative for back pain and neck pain.   Integumentary:  Negative for rash.        Small recurrent lipomas right forearm.  Less than 1 cm.  He has had a few removed over the years.   Neurological:  Negative for dizziness and headaches.   Hematological:  Does not bruise/bleed easily.   Psychiatric/Behavioral:  Negative for dysphoric mood and sleep disturbance.            Past Medical History:   Diagnosis Date    Acromioclavicular joint arthritis 3/20/2014    Hypothyroidism      Past Surgical History:   Procedure Laterality Date    ARTHROSCOPIC DEBRIDEMENT OF SHOULDER Left 10/3/2022    Procedure: DEBRIDEMENT, SHOULDER, ARTHROSCOPIC-LABRUM;  Surgeon: Jef Lawrence MD;  Location: [facility];  Service: Orthopedics;  Laterality: Left;    ARTHROSCOPIC REPAIR OF ROTATOR  CUFF OF SHOULDER Left 10/3/2022    Procedure: REPAIR, ROTATOR CUFF, ARTHROSCOPIC;  Surgeon: Jef Lawrence MD;  Location: [facility] OR;  Service: Orthopedics;  Laterality: Left;  Regional with catheter (interscalene)    ARTHROSCOPY OF SHOULDER WITH DECOMPRESSION OF SUBACROMIAL SPACE Left 10/3/2022    Procedure: ARTHROSCOPY, SHOULDER, WITH SUBACROMIAL SPACE DECOMPRESSION;  Surgeon: Jef Lawrence MD;  Location: [facility] OR;  Service: Orthopedics;  Laterality: Left;    COLONOSCOPY N/A 4/21/2023    Procedure: COLONOSCOPY;  Surgeon: Jeff Hough MD;  Location: [facility] ENDO (4TH FLR);  Service: Endoscopy;  Laterality: N/A;  pt wants prep inst mailed-RB  no answer for precall/mleone lpn    EPIDURAL STEROID INJECTION N/A 2/9/2022    Procedure: Injection, Steroid, Epidural C7/T1  DIRECT REFERRAL;  Surgeon: Deon Strauss MD;  Location: [facility] PAIN [facility]GT;  Service: Pain Management;  Laterality: N/A;    EPIDURAL STEROID INJECTION N/A 1/17/2024    Procedure: CERVICAL MARCELLA C7-T1 DIRECT REFERRAL;  Surgeon: Deon Struass MD;  Location: [facility] PAIN [facility]GT;  Service: Pain Management;  Laterality: N/A;  [phone]  *SCHEDULE IN JANUARY*    FIXATION OF TENDON Left 10/3/2022    Procedure: FIXATION, TENDON;  Surgeon: Jef Lawrence MD;  Location: [facility] OR;  Service: Orthopedics;  Laterality: Left;    NASAL SEPTUM SURGERY      SHOULDER ARTHROSCOPY      SHOULDER SURGERY      TONSILLECTOMY      TUMOR EXCISION      arm; lipoma      Problem List[1]     Objective:      Physical Exam  Constitutional:       General: He is not in acute distress.     Appearance: He is well-developed.   HENT:      Head: Normocephalic and atraumatic.      Right Ear: Tympanic membrane, ear canal and external ear normal.      Left Ear: Tympanic membrane, ear canal and external ear normal.      Mouth/Throat:      Pharynx: No oropharyngeal exudate or posterior oropharyngeal erythema.   Eyes:      General: No scleral icterus.     Conjunctiva/sclera:  Conjunctivae normal.      Pupils: Pupils are equal, round, and reactive to light.   Neck:      Thyroid: No thyromegaly.      Comments: No supraclavicular nodes palpated  Cardiovascular:      Rate and Rhythm: Normal rate and regular rhythm.      Pulses: Normal pulses.      Heart sounds: Normal heart sounds. No murmur heard.  Pulmonary:      Effort: Pulmonary effort is normal.      Breath sounds: Normal breath sounds. No wheezing.   Abdominal:      General: Bowel sounds are normal.      Palpations: Abdomen is soft. There is no mass.      Tenderness: There is no abdominal tenderness.   Musculoskeletal:         General: No tenderness.      Cervical back: Normal range of motion and neck supple.      Right lower leg: No edema.      Left lower leg: No edema.   Lymphadenopathy:      Cervical: No cervical adenopathy.   Skin:     Coloration: Skin is not jaundiced or pale.   Neurological:      General: No focal deficit present.      Mental Status: He is alert and oriented to person, place, and time.   Psychiatric:         Mood and Affect: Mood normal.         Behavior: Behavior normal.         Assessment:       Problem List Items Addressed This Visit    None  Visit Diagnoses         Routine physical examination    -  Primary    Relevant Orders    Lipid Panel    PSA, Screening    Hemoglobin A1C    TSH    CBC Auto Differential    Comprehensive Metabolic Panel      Neurogenic pain of left foot        Relevant Medications    DULoxetine (CYMBALTA) 20 MG capsule      Neurogenic pain of right foot        Relevant Medications    DULoxetine (CYMBALTA) 20 MG capsule      Lipoma of right upper extremity                Plan:         Medardo was seen today for annual exam.    Diagnoses and all orders for this visit:    Routine physical examination  -     Lipid Panel; Future  -     PSA, Screening; Future  -     Hemoglobin A1C; Future  -     TSH; Future  -     CBC Auto Differential; Future  -     Comprehensive Metabolic Panel;  "Future    Neurogenic pain of left foot  -     DULoxetine (CYMBALTA) 20 MG capsule; Take 2 capsules (40 mg total) by mouth once daily.    Neurogenic pain of right foot  -     DULoxetine (CYMBALTA) 20 MG capsule; Take 2 capsules (40 mg total) by mouth once daily.    Lipoma of right upper extremity    Other orders  -     levothyroxine (SYNTHROID) 75 MCG tablet; Take 1 tablet (75 mcg total) by mouth once daily.       Generally stable.  Continue meds.  Review labs, follow-up annually              Portions of this note may have been created with voice recognition software. Occasional "wrong-word" or "sound-a-like" substitutions may have occurred due to the inherent limitations of voice recognition software. Please, read the note carefully and recognize, using context, where substitutions have occurred.         [1]   Patient Active Problem List  Diagnosis    Hypothyroidism due to acquired atrophy of thyroid    Chronic pain of both shoulders    Rotator cuff tear    SLAP (superior labrum from anterior to posterior) tear    Biceps tendonitis    Acromioclavicular joint arthritis    S/P R shoulder surgery, arthroscopic RCR, SAD, DCE, open biceps tenodesis    Lateral epicondylitis of right elbow    Pain in both hands    Chronic pain of both knees    Marijuana user    Muscle tone increased    Primary osteoarthritis involving multiple joints    Other idiopathic scoliosis, thoracolumbar region    Left leg paresthesias    Decreased strength of trunk and back    Family history of arrhythmia    HTN (hypertension), benign     "

## 2025-04-01 ENCOUNTER — TELEPHONE (OUTPATIENT)
Dept: INTERNAL MEDICINE | Facility: CLINIC | Age: 55
End: 2025-04-01
Payer: COMMERCIAL

## 2025-04-01 LAB
ALBUMIN SERPL BCP-MCNC: 4.3 G/DL (ref 3.5–5.2)
ALP SERPL-CCNC: 88 UNIT/L (ref 40–150)
ALT SERPL W/O P-5'-P-CCNC: 15 UNIT/L (ref 10–44)
ANION GAP (OHS): 11 MMOL/L (ref 8–16)
AST SERPL-CCNC: 20 UNIT/L (ref 11–45)
BILIRUB SERPL-MCNC: 0.7 MG/DL (ref 0.1–1)
BUN SERPL-MCNC: 10 MG/DL (ref 6–20)
CALCIUM SERPL-MCNC: 9.6 MG/DL (ref 8.7–10.5)
CHLORIDE SERPL-SCNC: 108 MMOL/L (ref 95–110)
CHOLEST SERPL-MCNC: 192 MG/DL (ref 120–199)
CHOLEST/HDLC SERPL: 3.4 {RATIO} (ref 2–5)
CO2 SERPL-SCNC: 22 MMOL/L (ref 23–29)
CREAT SERPL-MCNC: 0.8 MG/DL (ref 0.5–1.4)
GFR SERPLBLD CREATININE-BSD FMLA CKD-EPI: >60 ML/MIN/1.73/M2
GLUCOSE SERPL-MCNC: 82 MG/DL (ref 70–110)
HDLC SERPL-MCNC: 56 MG/DL (ref 40–75)
HDLC SERPL: 29.2 % (ref 20–50)
LDLC SERPL CALC-MCNC: 119.4 MG/DL (ref 63–159)
NONHDLC SERPL-MCNC: 136 MG/DL
POTASSIUM SERPL-SCNC: 3.8 MMOL/L (ref 3.5–5.1)
PROT SERPL-MCNC: 7.2 GM/DL (ref 6–8.4)
PSA SERPL-MCNC: 0.36 NG/ML
SODIUM SERPL-SCNC: 141 MMOL/L (ref 136–145)
TRIGL SERPL-MCNC: 83 MG/DL (ref 30–150)
TSH SERPL-ACNC: 0.77 UIU/ML (ref 0.4–4)

## 2025-04-01 NOTE — TELEPHONE ENCOUNTER
----- Message from Arron Chen MD sent at 4/1/2025  7:51 AM CDT -----  Regarding: Labs look good  Please let him know that his labs look fine. He does not have a Portal. He may wish for us to send him a copy of labs.

## 2025-04-01 NOTE — TELEPHONE ENCOUNTER
LVM letting patient know that his labs are fine and to call the office with any questions or concerns.

## (undated) DEVICE — CLOSURE SKIN STERI STRIP 1/2X4

## (undated) DEVICE — GLOVE BIOGEL SKINSENSE PI 8.0

## (undated) DEVICE — DRAPE STERI U-SHAPED 47X51IN

## (undated) DEVICE — DRAPE STERI-DRAPE 1000 17X11IN

## (undated) DEVICE — CANNULA DRY DOC 7 X 85

## (undated) DEVICE — BURR OVAL CUTTING 6 MM

## (undated) DEVICE — DRESSING LEUKOPLAST FLEX 1X3IN

## (undated) DEVICE — DRAPE U SPLIT SHEET 54X76IN

## (undated) DEVICE — WRAP SHLDR HIP ACCU THRM PACK

## (undated) DEVICE — ELECTRODE 90 DEGREE ANGLE

## (undated) DEVICE — ELECTRODE REM PLYHSV RETURN 9

## (undated) DEVICE — UNDERGLOVES BIOGEL PI SIZE 8

## (undated) DEVICE — NDL MAYO CAT 1/2 CIR #5

## (undated) DEVICE — KIT SHOULDER POSITIONER SPIDER

## (undated) DEVICE — PAD ABD 8X10 STERILE

## (undated) DEVICE — DRAPE SHOULDER BEACH CHAIR

## (undated) DEVICE — APPLICATOR CHLORAPREP ORN 26ML

## (undated) DEVICE — PENCIL ELECTROSURG HOLST W/BLD

## (undated) DEVICE — TUBE SET INFLOW/OUTFLOW

## (undated) DEVICE — SUT VICRYL PLUS 3-0 SH 18IN

## (undated) DEVICE — TUBING SUC UNIV W/CONN 12FT

## (undated) DEVICE — SUPPORT SLING SHOT II MEDIUM

## (undated) DEVICE — GOWN POLY REINF X-LONG XL

## (undated) DEVICE — DRESSING XEROFORM FOIL PK 1X8

## (undated) DEVICE — SUT MONOCRYL 4-0 PS-2

## (undated) DEVICE — Device

## (undated) DEVICE — DRESSING AQUACEL AG SILVER 4X4

## (undated) DEVICE — DRAPE TOP 53X102IN

## (undated) DEVICE — SKIN MARKER DEVON 160

## (undated) DEVICE — SOL IRR NACL .9% 3000ML

## (undated) DEVICE — GRASPER SUTURE 45 DEGREE

## (undated) DEVICE — PAD DERMAPROX THCK 11X15X1IN

## (undated) DEVICE — PAD ELECTRODE STER 1.5X3

## (undated) DEVICE — SOL 9P NACL IRR PIC IL

## (undated) DEVICE — YANKAUER OPEN TIP W/O VENT

## (undated) DEVICE — ADHESIVE MASTISOL VIAL 48/BX

## (undated) DEVICE — BLADE SHAVER LANZA 4.2X13CM

## (undated) DEVICE — KIT TRIMANO CHIN